# Patient Record
Sex: FEMALE | Race: BLACK OR AFRICAN AMERICAN | Employment: OTHER | URBAN - METROPOLITAN AREA
[De-identification: names, ages, dates, MRNs, and addresses within clinical notes are randomized per-mention and may not be internally consistent; named-entity substitution may affect disease eponyms.]

---

## 2017-01-31 PROBLEM — R79.89 ABNORMAL THYROID BLOOD TEST: Status: ACTIVE | Noted: 2017-01-31

## 2017-05-19 PROBLEM — M10.9 ACUTE GOUT INVOLVING TOE OF LEFT FOOT: Status: ACTIVE | Noted: 2017-05-19

## 2017-07-18 PROBLEM — R53.83 FATIGUE: Status: ACTIVE | Noted: 2017-07-18

## 2017-07-18 PROBLEM — R60.0 LOCALIZED EDEMA: Status: ACTIVE | Noted: 2017-07-18

## 2018-04-11 PROBLEM — Z91.81 RISK FOR FALLS: Status: ACTIVE | Noted: 2018-04-11

## 2018-04-11 PROBLEM — I89.0 LYMPHEDEMA OF BOTH LOWER EXTREMITIES: Status: ACTIVE | Noted: 2018-04-11

## 2018-04-20 ENCOUNTER — HOSPITAL ENCOUNTER (OUTPATIENT)
Dept: PHYSICAL THERAPY | Age: 83
End: 2018-04-20
Attending: INTERNAL MEDICINE

## 2018-05-02 ENCOUNTER — HOSPITAL ENCOUNTER (OUTPATIENT)
Dept: PHYSICAL THERAPY | Age: 83
Discharge: HOME | End: 2018-05-02
Attending: INTERNAL MEDICINE

## 2018-05-02 NOTE — THERAPY EVALUATION
Yan Law  : 1935  Primary: Yamel Cisneros Medicare Hmo  Secondary:  [address]  at [facility] [address] [facility] [address], [facility] [address]  [phone]   [phone]        OUTPATIENT OCCUPATIONAL THERAPY: Note 2018      Ms. DE JESUS [facility] arrived for lymphedema therapy evaluation today. Upon chart review and discussion with patient, patient is having increased shortness of breath, weight gain, and increased edema in lower extremities, including the thighs, as well as the abdomen. She reports that she spoke to her cardiologist who recommended that she see her nephrologist and then follow up with cardiology. She has made an appointment to see her nephrologist next week. We will hold the therapy evaluation at this time until she has seen the nephrologist and cardiologist and is cleared medically to begin complete decongestive therapy.     Thank you for this referral,  Robert Baeza, OT

## 2018-05-12 ENCOUNTER — HOSPITAL ENCOUNTER (EMERGENCY)
Age: 83
Discharge: HOME | End: 2018-05-12
Attending: EMERGENCY MEDICINE
Payer: MEDICARE

## 2018-05-12 ENCOUNTER — APPOINTMENT (OUTPATIENT)
Dept: GENERAL RADIOLOGY | Age: 83
End: 2018-05-12
Attending: EMERGENCY MEDICINE
Payer: MEDICARE

## 2018-05-12 VITALS
OXYGEN SATURATION: 98 % | TEMPERATURE: 97.9 F | SYSTOLIC BLOOD PRESSURE: 120 MMHG | DIASTOLIC BLOOD PRESSURE: 88 MMHG | WEIGHT: 247 LBS | RESPIRATION RATE: 18 BRPM | BODY MASS INDEX: 39.7 KG/M2 | HEIGHT: 66 IN | HEART RATE: 93 BPM

## 2018-05-12 DIAGNOSIS — M17.10 ARTHRITIS OF KNEE: Primary | ICD-10-CM

## 2018-05-12 PROCEDURE — 74011250637 HC RX REV CODE- 250/637: Performed by: EMERGENCY MEDICINE

## 2018-05-12 PROCEDURE — 99283 EMERGENCY DEPT VISIT LOW MDM: CPT | Performed by: EMERGENCY MEDICINE

## 2018-05-12 PROCEDURE — 73562 X-RAY EXAM OF KNEE 3: CPT

## 2018-05-12 RX ORDER — ACETAMINOPHEN 325 MG/1
650 TABLET ORAL ONCE
Status: COMPLETED | OUTPATIENT
Start: 2018-05-12 | End: 2018-05-12

## 2018-05-12 RX ADMIN — ACETAMINOPHEN 650 MG: 325 TABLET ORAL at 10:37

## 2018-05-12 NOTE — ED TRIAGE NOTES
Patient reports right knee pain x3 weeks until yesterday when right knee began feeling numb. Reports numbness to touch and unable to \"control\" right knee. Reports is unable to bear weight. Denies injury.

## 2018-05-12 NOTE — DISCHARGE INSTRUCTIONS
Arthritis: Care Instructions  Your Care Instructions  Arthritis, also called osteoarthritis, is a breakdown of the cartilage that cushions your joints. When the cartilage wears down, your bones rub against each other. This causes pain and stiffness. Many people have some arthritis as they age. Arthritis most often affects the joints of the spine, hands, hips, knees, or feet. You can take simple measures to protect your joints, ease your pain, and help you stay active. Follow-up care is a key part of your treatment and safety. Be sure to make and go to all appointments, and call your doctor if you are having problems. It's also a good idea to know your test results and keep a list of the medicines you take. How can you care for yourself at home? · Stay at a healthy weight. Being overweight puts extra strain on your joints. · Talk to your doctor or physical therapist about exercises that will help ease joint pain. ¨ Stretch. You may enjoy gentle forms of yoga to help keep your joints and muscles flexible. ¨ Walk instead of jog. Other types of exercise that are less stressful on the joints include riding a bicycle, swimming, jerome chi, or water exercise. ¨ Lift weights. Strong muscles help reduce stress on your joints. Stronger thigh muscles, for example, take some of the stress off of the knees and hips. Learn the right way to lift weights so you do not make joint pain worse. · Take your medicines exactly as prescribed. Call your doctor if you think you are having a problem with your medicine. · Take pain medicines exactly as directed. ¨ If the doctor gave you a prescription medicine for pain, take it as prescribed. ¨ If you are not taking a prescription pain medicine, ask your doctor if you can take an over-the-counter medicine. · Use a cane, crutch, walker, or another device if you need help to get around. These can help rest your joints.  You also can use other things to make life easier, such as a higher toilet seat and padded handles on kitchen utensils. · Do not sit in low chairs, which can make it hard to get up. · Put heat or cold on your sore joints as needed. Use whichever helps you most. You also can take turns with hot and cold packs. ¨ Apply heat 2 or 3 times a day for 20 to 30 minutes-using a heating pad, hot shower, or hot pack-to relieve pain and stiffness. ¨ Put ice or a cold pack on your sore joint for 10 to 20 minutes at a time. Put a thin cloth between the ice and your skin. When should you call for help? Call your doctor now or seek immediate medical care if:  ? · You have sudden swelling, warmth, or pain in any joint. ? · You have joint pain and a fever or rash. ? · You have such bad pain that you cannot use a joint. ? Watch closely for changes in your health, and be sure to contact your doctor if:  ? · You have mild joint symptoms that continue even with more than 6 weeks of care at home. ? · You have stomach pain or other problems with your medicine. Where can you learn more? Go to http://manny-barbara.info/. Enter M391 in the search box to learn more about \"Arthritis: Care Instructions. \"  Current as of: October 31, 2016  Content Version: 11.4  © 3062-1695 [employer] Care instructions adapted under license by [employer] (which disclaims liability or warranty for this information). If you have questions about a medical condition or this instruction, always ask your healthcare professional. [place] [address] any warranty or liability for your use of this information.

## 2018-05-12 NOTE — ED PROVIDER NOTES
HPI Comments: Patient is an 66-year-old female with history of hypertension, diabetes, heart disease, chronic lymphedema who presents to the ER today complaining of knee pain. She initially told triage it is been hurting for several weeks. She told me it started hurting yesterday and then started feeling normal.  She had trouble walking. There was no fall or known injury. Daughter is at the bedside and reports that normally her mother can walk on her own but yesterday and today was unable to. She states that the knee feels \"unstable\" and normal.    Patient is a 80 y.o. female presenting with knee pain. The history is provided by the patient and a relative. Knee Pain    This is a new problem. The current episode started yesterday. The problem occurs constantly. The problem has been gradually worsening. The pain is present in the right knee. The quality of the pain is described as aching. Associated symptoms include numbness, limited range of motion and stiffness. Pertinent negatives include no itching, no back pain and no neck pain. The symptoms are aggravated by standing, movement and palpation. She has tried nothing for the symptoms. The treatment provided no relief. There has been no history of extremity trauma.         Past Medical History:   Diagnosis Date    Acute gout involving toe of left foot 5/19/2017    CAD (coronary artery disease) 2007    \"irregular heart beat\"    Cardiomyopathy [facility]) 11/25/2009    Chronic indwelling Harper catheter     Chronic kidney disease 2002    on HD since Nov 2012    Chronic systolic heart failure [facility]) 2007    Colon polyps 2016    CVA (cerebral vascular accident) ([address]     CVA (cerebral vascular accident) ([address] 8/8/2016    Deep vein thrombosis (DVT) of lower extremity ([address] 5/27/2016    Diabetes ([address] 2002    Diabetes mellitus ([address] 6/20/2012    Duodenal ulcer     Duodenal ulcer 11/18/2012    Duodenal ulcer 11/18/2012    Dyslipidemia 8/8/2016    Essential hypertension 10/26/2009    Heart failure ([facility]     Hypertension 2009    Hypoglycemia, unspecified 12/3/2012    JOÃO (iron deficiency anemia)     Obesity, morbid 11/25/2009    Other ill-defined conditions(799.89) 2002    Lymphedema    Persistent atrial fibrillation ([facility] 10/26/2009    Pulmonary HTN ([facility]     SBO (small bowel obstruction) ([facility] 8/8/2013    Sleep apnea 2010    non-compliant with CPAP    Sleep apnea - noncompliant with CPAP 11/13/2012    Type II diabetes mellitus, uncontrolled ([facility] 6/20/2012    Urinary tract infection 12/3/2012    UTI (lower urinary tract infection) 8/8/2013    UTI (urinary tract infection) 11/23/2012       Past Surgical History:   Procedure Laterality Date    COLONOSCOPY N/A 6/20/2016    [facility] END[facility]COPY    HX COLONOSCOPY  2016    polyp    HX HYSTERECTOMY  >20 years ago    HX TUBAL LIGATION  >40 years ago         Family History:   Problem Relation Age of Onset    Heart Disease Sister     Diabetes Other        Social History     Social History    Marital status:      Spouse name: N/A    Number of children: N/A    Years of education: N/A     Occupational History    Not on file. Social History Main Topics    Smoking status: Never Smoker    Smokeless tobacco: Never Used    Alcohol use No    Drug use: No    Sexual activity: Not on file     Other Topics Concern    Not on file     Social History Narrative    , lives at home with spouse. Retired day care worker         ALLERGIES: Benazepril; Dynacirc [isradipine]; and Peanut    Review of Systems   Constitutional: Negative. HENT: Negative. Eyes: Negative. Respiratory: Negative. Cardiovascular: Negative. Gastrointestinal: Negative. Endocrine: Negative. Genitourinary: Negative. Musculoskeletal: Positive for stiffness. Negative for back pain and neck pain. Skin: Negative for itching. Neurological: Positive for numbness. Negative for dizziness and weakness. Vitals:    05/12/18 0849   Pulse: 84   Resp: 20   Temp: 97.9 °F (36.6 °C)   SpO2: 98%   Weight: 112 kg (247 lb)   Height: 5' 6\" (1.676 m)            Physical Exam   Constitutional: She is oriented to person, place, and time. She appears well-developed and well-nourished. Overweight. HENT:   Head: Normocephalic and atraumatic. Cardiovascular: Intact distal pulses. Musculoskeletal: She exhibits edema and tenderness. Chronic lymphedema bilateral lower extremities, diffuse tenderness to the right knee and pain with passive range of motion. No obvious deformity. Neurological: She is alert and oriented to person, place, and time. She has normal strength. No cranial nerve deficit or sensory deficit. GCS eye subscore is 4. GCS verbal subscore is 5. GCS motor subscore is 6. Skin: Skin is warm and dry. No rash noted. Nursing note and vitals reviewed. MDM  Number of Diagnoses or Management Options  Diagnosis management comments: Differential diagnosis includes arthritis, effusion, fracture, dislocation       Amount and/or Complexity of Data Reviewed  Clinical lab tests: ordered and reviewed  Review and summarize past medical records: yes  Independent visualization of images, tracings, or specimens: yes    Risk of Complications, Morbidity, and/or Mortality  Presenting problems: low  Diagnostic procedures: low  Management options: low    Patient Progress  Patient progress: stable        ED Course   10:37 AM  X-ray of the right knee shows extensive arthritic changes. We will place her into a knee immobilizer for support. Advised Tylenol or ibuprofen for pain and follow-up with her doctor. Voice dictation software was used during the making of this note. This software is not perfect and grammatical and other typographical errors may be present. This note has been proofread, but may still contain errors.   Khurram Ramos MD; 5/12/2018 @10:40 AM ===================================================================        Procedures

## 2018-05-15 PROBLEM — E11.21 TYPE 2 DIABETES WITH NEPHROPATHY (HCC): Status: ACTIVE | Noted: 2018-05-15

## 2018-05-22 ENCOUNTER — HOSPITAL ENCOUNTER (OUTPATIENT)
Dept: PHYSICAL THERAPY | Age: 83
Discharge: HOME | End: 2018-05-22
Payer: MEDICARE

## 2018-05-22 PROCEDURE — G8991 OTHER PT/OT GOAL STATUS: HCPCS

## 2018-05-22 PROCEDURE — 97140 MANUAL THERAPY 1/> REGIONS: CPT

## 2018-05-22 PROCEDURE — G8990 OTHER PT/OT CURRENT STATUS: HCPCS

## 2018-05-22 PROCEDURE — 97166 OT EVAL MOD COMPLEX 45 MIN: CPT

## 2018-05-22 NOTE — PROGRESS NOTES
Ambulatory/Rehab Services H2 Model Falls Risk Assessment    Risk Factor Pts. ·   Confusion/Disorientation/Impulsivity  []    4 ·   Symptomatic Depression  []   2 ·   Altered Elimination  []   1 ·   Dizziness/Vertigo  []   1 ·   Gender (Male)  []   1 ·   Any administered antiepileptics (anticonvulsants):  []   2 ·   Any administered benzodiazepines:  []   1 ·   Visual Impairment (specify):  []   1 ·   Portable Oxygen Use  []   1 ·   Orthostatic ? BP  []   1 ·   History of Recent Falls (within 3 mos.)  []   5     Ability to Rise from Chair (choose one) Pts. ·   Ability to rise in a single movement  []   0 ·   Pushes up, successful in one attempt  [x]   1 ·   Multiple attempts, but successful  []   3 ·   Unable to rise without assistance  []   4   Total: (5 or greater = High Risk) 1     Falls Prevention Plan:   []                Physical Limitations to Exercise (specify):   []                Mobility Assistance Device (type):   []                Exercise/Equipment Adaptation (specify):    ©2010 [facility] Patent #[id number].  Federal Law prohibits the replication, distribution or use without written permission from [facility] [employer] [place]

## 2018-05-22 NOTE — THERAPY EVALUATION
Santy Traylor  : 1935  Primary: [employer]  Secondary:  [address]  at [address]  [address], [address]  [phone]   [phone]           OUTPATIENT OCCUPATIONAL THERAPY: Initial Assessment and Daily Note 2018    ICD-10: Treatment Diagnosis: Q82.0 hereditary or primary lymphedema, R26.2 difficulty in walking, not elsewhere classified, G89.0 pain, not elsewhere classified   Precautions/Allergies:   Benazepril; Dynacirc [isradipine]; and Peanut     Fall Risk Score: 1 (? 5 = High Risk)  MD Orders: eval and treat MEDICAL/REFERRING DIAGNOSIS:   Localized edema [R60.0]  Lymphedema, not elsewhere classified [I89.0]   DATE OF ONSET: lifelong , worsening symptoms over the last 5-10 years    REFERRING PHYSICIAN: George Barber MD  RETURN PHYSICIAN APPOINTMENT: to be determined      INITIAL ASSESSMENT:  Ms. Ron Bridges was referred to OT for the evaluation and treatment of bilateral LE lymphedema that appears to be genetic in nature. She states swelling has been present in her legs since she as a teenager/young adult. Symptoms worsened with time and she had therapy for lymphedema over 10 years ago. She wore compression garments for a few years but then stopped wearing them because they were too difficult to don and legs were swelling even while she wore them. Ms. Ron Bridges states her swelling was relatively stable for awhile but symptoms have worsened again over the last few years. Her son and granddaughter also have LE lymphedema. Swelling in her LE's have caused skin changes and legs are very heavy making them difficult to lift for walking and transfers. Pt would benefit from skilled OT to decrease bilateral LE lymphedema before transitioning to long term compression garments and home program.     PLAN OF CARE:   PROBLEM LIST:  1. Decreased Activity Tolerance  2. Edema/Girth  3.  Decreased Skin Integrity/Hygeine INTERVENTIONS PLANNED  1. Skin care  2. Compression bandaging  3. Fitting for compression garment(s)  4. Manual therapy/Manual lymph drainage  5. Therapeutic exercise/Therapeutic activities  6. Patient Education  7. Compression pump trial prn  8.  kinesiotaping    TREATMENT PLAN:  Effective Dates: 5-22-18 TO 8-19-18. Frequency/Duration: 2 times a week for 90 days  2 x 8 week x 90 days and upon reassessment. Will adjust frequency and duration as progress indicates. GOALS: (Goals have been discussed and agreed upon with patient.)  Short-Term Functional Goals: Time Frame: 45 days  1. The patient/caregiver will demonstrate understanding of lymphedema precautions. 2. Patient will be independent with skin care regimen to decrease risk of cellulitis. 3. The patient/caregiver will be independent with self-manual lymph drainage techniques and show decrease in limb volume. 4. Patient will be independent in lymphatic exercises. Discharge Goals: Time Frame: 90 days  1. Patient's bilateral lower extremity circumferential measurements will decrease on volumetric graph by 10-15cm to maximize functional use in ADL's.    2. The patient/caregiver will be independent with home management of lymphedema. 3. Patient/caregiver will be independent donning and doffing bilateral lower extremity compression garment and compliant with daily wear. .    Rehabilitation Potential For Stated Goals: Good  Regarding Delroy Simons's therapy, I certify that the treatment plan above will be carried out by a therapist or under their direction. Thank you for this referral,  Brian Eduardo OT     Referring Physician Signature: Selvin Nunez MD _________________________  Date _________            The information in this section was collected on 5/22/2018 (except where otherwise noted).   OCCUPATIONAL PROFILE & HISTORY:   History of Present Injury/Illness (Reason for Referral):  Ms. Marcelo Perkins was referred to OT for the evaluation and treatment of bilateral LE lymphedema that appears to be genetic in nature. She states swelling has been present in her legs since she as a teenager/young adult. Symptoms worsened with time and she had therapy for lymphedema over 10 years ago. She wore compression garments for a few years but then stopped wearing them because they were too difficult to don and legs were swelling even while she wore them. Ms. Chiara Gaston states her swelling was relatively stable for awhile but symptoms have worsened again over the last few years. Her son and granddaughter also have LE lymphedema. Swelling in her LE's have caused skin changes and legs are very heavy making them difficult to lift for walking and transfers. Pt would benefit from skilled OT to decrease bilateral LE lymphedema before transitioning to long term compression garments and home program.    Past Medical History/Comorbidities:   Ms. Chiara Gaston  has a past medical history of Acute gout involving toe of left foot (5/19/2017); CAD (coronary artery disease) (2007); Cardiomyopathy ([address] (11/25/2009); Chronic indwelling Harper catheter; Chronic kidney disease (2002); Chronic systolic heart failure ([address] (2007); Colon polyps (2016); CVA (cerebral vascular accident) [facility]); CVA (cerebral vascular accident) ([address] (8/8/2016); Deep vein thrombosis (DVT) of lower extremity ([address] (5/27/2016); Diabetes ([address] (2002); Diabetes mellitus ([address] (6/20/2012); Duodenal ulcer; Duodenal ulcer (11/18/2012); Duodenal ulcer (11/18/2012); Dyslipidemia (8/8/2016); Essential hypertension (10/26/2009); Heart failure ([address] Hypertension (2009); Hypoglycemia, unspecified (12/3/2012); JOÃO (iron deficiency anemia); Obesity, morbid (11/25/2009); Other ill-defined conditions(799.89) (2002); Persistent atrial fibrillation ([address] (10/26/2009); Pulmonary HTN ([address] SBO (small bowel obstruction) ([address] (8/8/2013); Sleep apnea (2010); Sleep apnea - noncompliant with CPAP (11/13/2012);  Type II diabetes mellitus, uncontrolled ([facility] (6/20/2012); Urinary tract infection (12/3/2012); UTI (lower urinary tract infection) (8/8/2013); and UTI (urinary tract infection) (11/23/2012). She also has no past medical history of Asthma; Autoimmune disease ([facility] Cancer ([facility] COPD; DEMENTIA; Liver disease; Psychiatric disorder; or Seizures ([facility] Ms. Allison Mora  has a past surgical history that includes hx hysterectomy (>20 years ago); hx tubal ligation (>40 years ago); hx colonoscopy (2016); and colonoscopy (N/A, 6/20/2016). Social History/Living Environment:     Pt lives in her own home with her spouse - no steps for entry  Prior Level of Function/Work/Activity:  Retired - no regular exercise   Dominant Side:         RIGHT  Previous Treatment Approaches:          Therapy for lymphedema >10 years ago - she has not worn compression garments in 5-7 years  Current Medications:    Current Outpatient Prescriptions:     predniSONE (DELTASONE) 20 mg tablet, Take 3 tablets PO once daily for 3 days; Then take 2 tablets PO once daily for 3 days; Then take 1 tablet  PO once daily for 3 days. , Disp: 18 Tab, Rfl: 1    glimepiride (AMARYL) 1 mg tablet, TAKE 1 TAB BY MOUTH DAILY (BEFORE BREAKFAST). , Disp: 30 Tab, Rfl: 3    torsemide (DEMADEX) 20 mg tablet, TAKE 1 TABLET BY MOUTH DAILY TAKE 4 TABLETS ON MON,WED,AND FRI,THEN 3 TABLETS ON TUES,THURS AND SAT, Disp: 100 Tab, Rfl: 5    metOLazone (ZAROXOLYN) 5 mg tablet, TAKE 1 TABLET BY MOUTH 30 MINUTES BEFORE LASIX DOSE, Disp: 90 Tab, Rfl: 4    furosemide (LASIX) 40 mg tablet, Take 2 Tabs by mouth every twelve (12) hours. Indications: fluid and heart, Disp: 180 Tab, Rfl: 4    carvedilol (COREG) 12.5 mg tablet, Take 1 Tab by mouth two (2) times daily (with meals). , Disp: 180 Tab, Rfl: 4    cholecalciferol (VITAMIN D3) 1,000 unit cap, Take 1 Cap by mouth daily. , Disp: 90 Cap, Rfl: 4    potassium chloride SA (MICRO-K) 10 mEq capsule, Take 1 Cap by mouth daily.  Indications: hypokalemia, Disp: 90 Cap, Rfl: 4    spironolactone (ALDACTONE) 25 mg tablet, Take 1 Tab by mouth two (2) times a day. Indications: heart failure, Disp: 180 Tab, Rfl: 4    pantoprazole (PROTONIX) 40 mg tablet, Take 1 Tab by mouth Daily (before breakfast). , Disp: 90 Tab, Rfl: 4    isosorbide mononitrate ER (IMDUR) 30 mg tablet, Take 1 Tab by mouth daily. , Disp: 90 Tab, Rfl: 4    aspirin 81 mg tablet, Take 81 mg by mouth daily. , Disp: , Rfl:             Date Last Reviewed:  5/22/2018   Complexity Level : Expanded review of therapy/medical records (1-2):  MODERATE COMPLEXITY   ASSESSMENT OF OCCUPATIONAL PERFORMANCE:   Palpation:          Bilateral LE lymphedema with dense/non pitting fluid/tissue throughout :  Lobular lymphedema around ankles/dorsal hump  - mild swelling in right thigh  ROM:          WFL  (ankle movements are stiff due to increased swelling)  Special Tests:          stemmer sign positive   Skin Integrity:         Intact with  Hyperpigmentation/discoloration, dry/scaly  Sensation:  Intact per pt  Functional Mobility:  Rollator for community and cane at home  Activities of Daily Living :  Modified independence with decreased tolerance   Edema/Girth:  non-pitting   PRETREATMENT AFFECTED LIMB(s): lower extremity      Date:  5-22-18         Right / Left           Groin   []      []           8 inches   []      []           4 inches   []      []         PoplitealSpace   [x]      [x] 46/48          8 inches   [x]      [x] 49/49          4 inches   [x]      [x] 41/39          Ankle   [x]      [x] 38.5/35          Instep   [x]      [x] 29/27.5        Measurements are taken in centimeters:  2.54 cm = 1 inch     203. 5/198.5                       Physical Skills Involved:  1. Activity Tolerance  2. Edema  3. Skin Integrity Cognitive Skills Affected (resulting in the inability to perform in a timely and safe manner):   Psychosocial Skills Affected:  1. Habits/Routines  2.  Environmental Adaptation   Number of elements that affect the Plan of Care: 3-5:  MODERATE COMPLEXITY   CLINICAL DECISION MAKING:   Outcome Measure: Tool Used: Tool Used: Lymphedema Life Impact Scale   Score:  Initial: 34 Most Recent: X (Date: -- )   Interpretation of Score: The Lymphedema Life Impact Scale (LLIS) is a validated instrument that measures the physical, functional, and psychosocial concerns pertinent to patients with extremity lymphedema. The Scale's questionnaire is administered to patients to gauge impairments, activity limitations, and participation restrictions resulting from their lymphedema. Score 0 1-13 14-26 27-40 41-54 55-67 68   Modifier CH CI CJ CK CL CM CN     ? Other PT/OT Primary Functional Limitations:     - CURRENT STATUS: CK - 40%-59% impaired, limited or restricted    - GOAL STATUS: CJ - 20%-39% impaired, limited or restricted    - D/C STATUS:  ---------------To be determined---------------       Medical Necessity:   · Skilled intervention continues to be required due to uncontrolled swelling putting her at risk for cellulitis and interfering with LE mobility. Reason for Services/Other Comments:  · Patient continues to require skilled intervention due to inability to self manage . Clinical Decision-Making Assessment:     Use of outcome tool(s) and clinical judgement create a POC that gives a: Questionable prediction of patient's progress: MODERATE COMPLEXITY   TREATMENT:   (In addition to Assessment/Re-Assessment sessions the following treatments were rendered)    Pre-treatment Symptoms/Complaints:  Bilateral LE lymphedema   Pain: Initial:   Pain Intensity 1: 0  Post Session:  0   Occupational Therapy Treatments:    OT eval( x ) OT eval was completed. Pt received information on lymphedema and risk reduction/self management practices as outlined by the [employer]. Therapeutic Exercise ( minutes):     HEP:  As above; handouts given to patient for all exercises.   Manual Therapy:30 min  Pt education on signs/symptoms and treatment for lymphedema - skin care/compression bandaging/measurements made of LE's           Manual Lymph Drainage:          Lymph Nodes:    Cervical Supraclavicular Axillary Abdominal Inguinal Popliteal Antecubital   RIGHT []     []     []     []     []     []     []       LEFT []     []     []     []     []     []     []          Anastamoses:   Axillo-axillary Inguino-inguinal Axillo-inguinal Inguino-axillary   ANTERIOR []     []     []     []       POSTERIOR []     []     []     []       RIGHT []     []     []     []       LEFT []     []     []     []         Limbs:   []    RUE     []    LUE     []    RLE    []    LLE   Compression: ( minutes): After skin care/inspection, a multi layered compression bandage was applied to bilateral LE's from foot to knee over Lymphesoft foam.  Pt instructed to remove if any increase in shortness of breath (she has shortness of breath with activity- under care of MD). She verbalized understanding     Treatment/Session Assessment:    · Response to Treatment:  Pt tolerated treatment session well with no medical complications. Skin was intact and addressed with Eucerin. Pt.'s goal for therapy is to \"decrease swelling so her legs won't be so heavy\". She has support system in spouse and family and has been through CDT before. She is concerned about high copay. Will decrease swelling and transition to Lluvia Joyner as soon as appropriate as she states she and her spouse have tried and can not manage bandages at home. · Compliance with Program/Exercises: Will assess as treatment progresses. · Recommendations/Intent for next treatment session: \"Next visit will focus on complete decongestive therapy\".   Total Treatment Duration:60min  OT Patient Time In/Time Out  Time In: 0100  Time Out: [address], OT

## 2018-05-24 ENCOUNTER — HOSPITAL ENCOUNTER (OUTPATIENT)
Dept: PHYSICAL THERAPY | Age: 83
Discharge: HOME | End: 2018-05-24
Payer: MEDICARE

## 2018-05-24 PROCEDURE — 97140 MANUAL THERAPY 1/> REGIONS: CPT

## 2018-05-24 NOTE — PROGRESS NOTES
Teresita Kannan  : 1935  Primary: Brent[employer] Cisneros Medicare o  Secondary:  [address]  at [place] [address] Therapy  [address], [address]  [phone]   [phone]           OUTPATIENT OCCUPATIONAL THERAPY: Daily Note 2018    ICD-10: Treatment Diagnosis: Q82.0 hereditary or primary lymphedema, R26.2 difficulty in walking, not elsewhere classified, G89.0 pain, not elsewhere classified   Precautions/Allergies:   Benazepril; Dynacirc [isradipine]; and Peanut     Fall Risk Score: 1 (? 5 = High Risk)  MD Orders: eval and treat MEDICAL/REFERRING DIAGNOSIS:   Localized edema [R60.0]  Lymphedema, not elsewhere classified [I89.0]   DATE OF ONSET: lifelong , worsening symptoms over the last 5-10 years    REFERRING PHYSICIAN: Sandre Buerger, MD  RETURN PHYSICIAN APPOINTMENT: to be determined      INITIAL ASSESSMENT:  Ms. Robbin Cook was referred to OT for the evaluation and treatment of bilateral LE lymphedema that appears to be genetic in nature. She states swelling has been present in her legs since she as a teenager/young adult. Symptoms worsened with time and she had therapy for lymphedema over 10 years ago. She wore compression garments for a few years but then stopped wearing them because they were too difficult to don and legs were swelling even while she wore them. Ms. Robbin Cook states her swelling was relatively stable for awhile but symptoms have worsened again over the last few years. Her son and granddaughter also have LE lymphedema. Swelling in her LE's have caused skin changes and legs are very heavy making them difficult to lift for walking and transfers. Pt would benefit from skilled OT to decrease bilateral LE lymphedema before transitioning to long term compression garments and home program.     PLAN OF CARE:   PROBLEM LIST:  1. Decreased Activity Tolerance  2. Edema/Girth  3. Decreased Skin Integrity/Hygeine INTERVENTIONS PLANNED  1. Skin care  2. Compression bandaging  3. Fitting for compression garment(s)  4. Manual therapy/Manual lymph drainage  5. Therapeutic exercise/Therapeutic activities  6. Patient Education  7. Compression pump trial prn  8.  kinesiotaping    TREATMENT PLAN:  Effective Dates: 5-22-18 TO 8-19-18. Frequency/Duration: 2 times a week for 90 days  2 x 8 week x 90 days and upon reassessment. Will adjust frequency and duration as progress indicates. GOALS: (Goals have been discussed and agreed upon with patient.)  Short-Term Functional Goals: Time Frame: 45 days  1. The patient/caregiver will demonstrate understanding of lymphedema precautions. 2. Patient will be independent with skin care regimen to decrease risk of cellulitis. 3. The patient/caregiver will be independent with self-manual lymph drainage techniques and show decrease in limb volume. 4. Patient will be independent in lymphatic exercises. Discharge Goals: Time Frame: 90 days  1. Patient's bilateral lower extremity circumferential measurements will decrease on volumetric graph by 10-15cm to maximize functional use in ADL's.    2. The patient/caregiver will be independent with home management of lymphedema. 3. Patient/caregiver will be independent donning and doffing bilateral lower extremity compression garment and compliant with daily wear. .    Rehabilitation Potential For Stated Goals: Good  Regarding Delroy Simons's therapy, I certify that the treatment plan above will be carried out by a therapist or under their direction. Thank you for this referral,  [place] Leos OT                 The information in this section was collected on 5/24/2018 (except where otherwise noted). OCCUPATIONAL PROFILE & HISTORY:   History of Present Injury/Illness (Reason for Referral):  Ms. Hermann Flynn was referred to OT for the evaluation and treatment of bilateral LE lymphedema that appears to be genetic in nature.   She states swelling has been present in her legs since she as a teenager/young adult. Symptoms worsened with time and she had therapy for lymphedema over 10 years ago. She wore compression garments for a few years but then stopped wearing them because they were too difficult to don and legs were swelling even while she wore them. Ms. Uzair Pendleton states her swelling was relatively stable for awhile but symptoms have worsened again over the last few years. Her son and granddaughter also have LE lymphedema. Swelling in her LE's have caused skin changes and legs are very heavy making them difficult to lift for walking and transfers. Pt would benefit from skilled OT to decrease bilateral LE lymphedema before transitioning to long term compression garments and home program.    Past Medical History/Comorbidities:   Ms. Uzair Pendleton  has a past medical history of Acute gout involving toe of left foot (5/19/2017); CAD (coronary artery disease) (2007); Cardiomyopathy ([address] (11/25/2009); Chronic indwelling Harper catheter; Chronic kidney disease (2002); Chronic systolic heart failure ([address] (2007); Colon polyps (2016); CVA (cerebral vascular accident) [facility]); CVA (cerebral vascular accident) ([address] (8/8/2016); Deep vein thrombosis (DVT) of lower extremity ([address] (5/27/2016); Diabetes ([address] (2002); Diabetes mellitus ([address] (6/20/2012); Duodenal ulcer; Duodenal ulcer (11/18/2012); Duodenal ulcer (11/18/2012); Dyslipidemia (8/8/2016); Essential hypertension (10/26/2009); Heart failure ([address] Hypertension (2009); Hypoglycemia, unspecified (12/3/2012); JOÃO (iron deficiency anemia); Obesity, morbid (11/25/2009); Other ill-defined conditions(799.89) (2002); Persistent atrial fibrillation ([address] (10/26/2009); Pulmonary HTN ([address] SBO (small bowel obstruction) ([address] (8/8/2013); Sleep apnea (2010); Sleep apnea - noncompliant with CPAP (11/13/2012); Type II diabetes mellitus, uncontrolled ([facility] (6/20/2012);  Urinary tract infection (12/3/2012); UTI (lower urinary tract infection) (8/8/2013); and UTI (urinary tract infection) (11/23/2012). She also has no past medical history of Asthma; Autoimmune disease ([facility] Cancer ([facility] COPD; DEMENTIA; Liver disease; Psychiatric disorder; or Seizures ([facility] Ms. Samira Ragland  has a past surgical history that includes hx hysterectomy (>20 years ago); hx tubal ligation (>40 years ago); hx colonoscopy (2016); and colonoscopy (N/A, 6/20/2016). Social History/Living Environment:     Pt lives in her own home with her spouse - no steps for entry  Prior Level of Function/Work/Activity:  Retired - no regular exercise   Dominant Side:         RIGHT  Previous Treatment Approaches:          Therapy for lymphedema >10 years ago - she has not worn compression garments in 5-7 years  Current Medications:    Current Outpatient Prescriptions:     predniSONE (DELTASONE) 20 mg tablet, Take 3 tablets PO once daily for 3 days; Then take 2 tablets PO once daily for 3 days; Then take 1 tablet  PO once daily for 3 days. , Disp: 18 Tab, Rfl: 1    glimepiride (AMARYL) 1 mg tablet, TAKE 1 TAB BY MOUTH DAILY (BEFORE BREAKFAST). , Disp: 30 Tab, Rfl: 3    torsemide (DEMADEX) 20 mg tablet, TAKE 1 TABLET BY MOUTH DAILY TAKE 4 TABLETS ON MON,WED,AND FRI,THEN 3 TABLETS ON TUES,THURS AND SAT, Disp: 100 Tab, Rfl: 5    metOLazone (ZAROXOLYN) 5 mg tablet, TAKE 1 TABLET BY MOUTH 30 MINUTES BEFORE LASIX DOSE, Disp: 90 Tab, Rfl: 4    furosemide (LASIX) 40 mg tablet, Take 2 Tabs by mouth every twelve (12) hours. Indications: fluid and heart, Disp: 180 Tab, Rfl: 4    carvedilol (COREG) 12.5 mg tablet, Take 1 Tab by mouth two (2) times daily (with meals). , Disp: 180 Tab, Rfl: 4    cholecalciferol (VITAMIN D3) 1,000 unit cap, Take 1 Cap by mouth daily. , Disp: 90 Cap, Rfl: 4    potassium chloride SA (MICRO-K) 10 mEq capsule, Take 1 Cap by mouth daily. Indications: hypokalemia, Disp: 90 Cap, Rfl: 4    spironolactone (ALDACTONE) 25 mg tablet, Take 1 Tab by mouth two (2) times a day.  Indications: heart failure, Disp: 180 Tab, Rfl: 4    pantoprazole (PROTONIX) 40 mg tablet, Take 1 Tab by mouth Daily (before breakfast). , Disp: 90 Tab, Rfl: 4    isosorbide mononitrate ER (IMDUR) 30 mg tablet, Take 1 Tab by mouth daily. , Disp: 90 Tab, Rfl: 4    aspirin 81 mg tablet, Take 81 mg by mouth daily. , Disp: , Rfl:             Date Last Reviewed:  5/24/2018   Complexity Level : Expanded review of therapy/medical records (1-2):  MODERATE COMPLEXITY   ASSESSMENT OF OCCUPATIONAL PERFORMANCE:   Palpation:          Bilateral LE lymphedema with dense/non pitting fluid/tissue throughout :  Lobular lymphedema around ankles/dorsal hump  - mild swelling in right thigh  ROM:          WFL  (ankle movements are stiff due to increased swelling)  Special Tests:          stemmer sign positive   Skin Integrity:         Intact with  Hyperpigmentation/discoloration, dry/scaly  Sensation:  Intact per pt  Functional Mobility:  Rollator for community and cane at home  Activities of Daily Living :  Modified independence with decreased tolerance   Edema/Girth:  non-pitting   PRETREATMENT AFFECTED LIMB(s): lower extremity      Date:  5-22-18         Right / Left           Groin   []      []           8 inches   []      []           4 inches   []      []         PoplitealSpace   [x]      [x] 46/48          8 inches   [x]      [x] 49/49          4 inches   [x]      [x] 41/39          Ankle   [x]      [x] 38.5/35          Instep   [x]      [x] 29/27.5        Measurements are taken in centimeters:  2.54 cm = 1 inch     203. 5/198.5                       Physical Skills Involved:  1. Activity Tolerance  2. Edema  3. Skin Integrity Cognitive Skills Affected (resulting in the inability to perform in a timely and safe manner):   Psychosocial Skills Affected:  1. Habits/Routines  2. Environmental Adaptation   Number of elements that affect the Plan of Care: 3-5:  MODERATE COMPLEXITY   CLINICAL DECISION MAKING:   Outcome Measure: Tool Used:  Tool Used: Lymphedema Life Impact Scale   Score:  Initial: 34 Most Recent: X (Date: -- )   Interpretation of Score: The Lymphedema Life Impact Scale (LLIS) is a validated instrument that measures the physical, functional, and psychosocial concerns pertinent to patients with extremity lymphedema. The Scale's questionnaire is administered to patients to gauge impairments, activity limitations, and participation restrictions resulting from their lymphedema. Score 0 1-13 14-26 27-40 41-54 55-67 68   Modifier CH CI CJ CK CL CM CN     ? Other PT/OT Primary Functional Limitations:     - CURRENT STATUS: CK - 40%-59% impaired, limited or restricted    - GOAL STATUS: CJ - 20%-39% impaired, limited or restricted    - D/C STATUS:  ---------------To be determined---------------       Medical Necessity:   · Skilled intervention continues to be required due to uncontrolled swelling putting her at risk for cellulitis and interfering with LE mobility. Reason for Services/Other Comments:  · Patient continues to require skilled intervention due to inability to self manage . Clinical Decision-Making Assessment:     Use of outcome tool(s) and clinical judgement create a POC that gives a: Questionable prediction of patient's progress: MODERATE COMPLEXITY   TREATMENT:   (In addition to Assessment/Re-Assessment sessions the following treatments were rendered)    Pre-treatment Symptoms/Complaints:  Bilateral LE lymphedema - tolerated bandages with no medical complications ie no increased SOB  Pain: Initial:   Pain Intensity 1: 0  Post Session:  0   Occupational Therapy Treatments:    OT eval( x ) OT eval was completed. Pt received information on lymphedema and risk reduction/self management practices as outlined by the [employer]ymp[employer]. Therapeutic Exercise ( minutes):     HEP:  As above; handouts given to patient for all exercises.   Manual Therapy:60 min  Skin care/inspection, MLD treatment, compression bandaging, pt education Manual Lymph Drainage:          Lymph Nodes:    Cervical Supraclavicular Axillary Abdominal Inguinal Popliteal Antecubital   RIGHT []     [x]     [x]     [x]     [x]     [x]     []       LEFT []     [x]     [x]     [x]     [x]     [x]     []          Anastamoses:   Axillo-axillary Inguino-inguinal Axillo-inguinal Inguino-axillary   ANTERIOR []     []     []     [x]       POSTERIOR []     []     []     []       RIGHT []     []     []     [x]       LEFT []     []     []     [x]         Limbs:   []    RUE     []    LUE     [x]    RLE    [x]    LLE   Compression: : After skin care/inspection, a multi layered compression bandage was applied to bilateral LE's from foot to knee over Lymphesoft foam.  Pt instructed to remove if any increase in shortness of breath (she has shortness of breath with activity- under care of MD). She verbalized understanding     Treatment/Session Assessment:    · Response to Treatment:  Pt tolerated treatment session well with no medical complications. Skin was intact and addressed with Eucerin. Pt.'s goal for therapy is to \"decrease swelling so her legs won't be so heavy\". She has support system in spouse and family and has been through CDT before. She is concerned about high copay. [facility] will be here in one week for fitting training and will provide free garments for participating pts. She would like to take advantage of this opportunity for Circaid velcro garments or reduction kit. · Compliance with Program/Exercises: compliant with plan. · Recommendations/Intent for next treatment session: \"Next visit will focus on complete decongestive therapy\".   Total Treatment Duration:60min  OT Patient Time In/Time Out  Time In: 0100  Time Out: [address], OT

## 2018-05-28 ENCOUNTER — APPOINTMENT (OUTPATIENT)
Dept: GENERAL RADIOLOGY | Age: 83
End: 2018-05-28
Attending: EMERGENCY MEDICINE
Payer: MEDICARE

## 2018-05-28 ENCOUNTER — HOSPITAL ENCOUNTER (EMERGENCY)
Age: 83
Discharge: HOME | End: 2018-05-28
Attending: EMERGENCY MEDICINE
Payer: MEDICARE

## 2018-05-28 ENCOUNTER — APPOINTMENT (OUTPATIENT)
Dept: ULTRASOUND IMAGING | Age: 83
End: 2018-05-28
Attending: EMERGENCY MEDICINE
Payer: MEDICARE

## 2018-05-28 VITALS
HEART RATE: 81 BPM | OXYGEN SATURATION: 98 % | HEIGHT: 67 IN | TEMPERATURE: 98.8 F | RESPIRATION RATE: 16 BRPM | SYSTOLIC BLOOD PRESSURE: 187 MMHG | DIASTOLIC BLOOD PRESSURE: 79 MMHG | WEIGHT: 235 LBS | BODY MASS INDEX: 36.88 KG/M2

## 2018-05-28 DIAGNOSIS — M17.11 PRIMARY OSTEOARTHRITIS OF RIGHT KNEE: ICD-10-CM

## 2018-05-28 DIAGNOSIS — M10.9 GOUTY ARTHRITIS: ICD-10-CM

## 2018-05-28 DIAGNOSIS — M25.461 KNEE EFFUSION, RIGHT: ICD-10-CM

## 2018-05-28 DIAGNOSIS — M25.561 ACUTE PAIN OF RIGHT KNEE: Primary | ICD-10-CM

## 2018-05-28 LAB
ANION GAP SERPL CALC-SCNC: 9 MMOL/L (ref 7–16)
APPEARANCE FLD: NORMAL
BASOPHILS # BLD: 0 K/UL (ref 0–0.2)
BASOPHILS NFR BLD: 0 % (ref 0–2)
BUN SERPL-MCNC: 30 MG/DL (ref 8–23)
CALCIUM SERPL-MCNC: 10.2 MG/DL (ref 8.3–10.4)
CHLORIDE SERPL-SCNC: 104 MMOL/L (ref 98–107)
CO2 SERPL-SCNC: 28 MMOL/L (ref 21–32)
COLOR FLD: YELLOW
CREAT SERPL-MCNC: 1.45 MG/DL (ref 0.6–1)
DIFFERENTIAL METHOD BLD: ABNORMAL
EOSINOPHIL # BLD: 0 K/UL (ref 0–0.8)
EOSINOPHIL NFR BLD: 0 % (ref 0.5–7.8)
ERYTHROCYTE [DISTWIDTH] IN BLOOD BY AUTOMATED COUNT: 15.6 % (ref 11.9–14.6)
GLUCOSE SERPL-MCNC: 92 MG/DL (ref 65–100)
HCT VFR BLD AUTO: 34.7 % (ref 35.8–46.3)
HGB BLD-MCNC: 10.9 G/DL (ref 11.7–15.4)
IMM GRANULOCYTES # BLD: 0 K/UL (ref 0–0.5)
IMM GRANULOCYTES NFR BLD AUTO: 0 % (ref 0–5)
LYMPHOCYTES # BLD: 0.8 K/UL (ref 0.5–4.6)
LYMPHOCYTES NFR BLD: 8 % (ref 13–44)
LYMPHOCYTES NFR FLD: 1 %
MCH RBC QN AUTO: 28.3 PG (ref 26.1–32.9)
MCHC RBC AUTO-ENTMCNC: 31.4 G/DL (ref 31.4–35)
MCV RBC AUTO: 90.1 FL (ref 79.6–97.8)
MONOCYTES # BLD: 1.2 K/UL (ref 0.1–1.3)
MONOCYTES NFR BLD: 12 % (ref 4–12)
MONOS+MACROS NFR FLD: 7 %
NEUTROPHILS NFR FLD: 92 %
NEUTS SEG # BLD: 7.5 K/UL (ref 1.7–8.2)
NEUTS SEG NFR BLD: 80 % (ref 43–78)
NUC CELL # FLD: NORMAL /CU MM
PLATELET # BLD AUTO: 131 K/UL (ref 150–450)
PMV BLD AUTO: 12.1 FL (ref 10.8–14.1)
POTASSIUM SERPL-SCNC: 4.1 MMOL/L (ref 3.5–5.1)
RBC # BLD AUTO: 3.85 M/UL (ref 4.05–5.25)
RBC # FLD: NORMAL /CU MM
SODIUM SERPL-SCNC: 141 MMOL/L (ref 136–145)
SPECIMEN SOURCE FLD: NORMAL
URATE SERPL-MCNC: 11.1 MG/DL (ref 2.6–6)
WBC # BLD AUTO: 9.5 K/UL (ref 4.3–11.1)

## 2018-05-28 PROCEDURE — 71045 X-RAY EXAM CHEST 1 VIEW: CPT

## 2018-05-28 PROCEDURE — 85025 COMPLETE CBC W/AUTO DIFF WBC: CPT | Performed by: EMERGENCY MEDICINE

## 2018-05-28 PROCEDURE — 96374 THER/PROPH/DIAG INJ IV PUSH: CPT | Performed by: EMERGENCY MEDICINE

## 2018-05-28 PROCEDURE — 96376 TX/PRO/DX INJ SAME DRUG ADON: CPT | Performed by: EMERGENCY MEDICINE

## 2018-05-28 PROCEDURE — 99284 EMERGENCY DEPT VISIT MOD MDM: CPT | Performed by: EMERGENCY MEDICINE

## 2018-05-28 PROCEDURE — 89060 EXAM SYNOVIAL FLUID CRYSTALS: CPT | Performed by: EMERGENCY MEDICINE

## 2018-05-28 PROCEDURE — 75810000054 HC ARTHOCENTISIS JOINT: Performed by: EMERGENCY MEDICINE

## 2018-05-28 PROCEDURE — 96375 TX/PRO/DX INJ NEW DRUG ADDON: CPT | Performed by: EMERGENCY MEDICINE

## 2018-05-28 PROCEDURE — 84550 ASSAY OF BLOOD/URIC ACID: CPT | Performed by: EMERGENCY MEDICINE

## 2018-05-28 PROCEDURE — 89050 BODY FLUID CELL COUNT: CPT | Performed by: EMERGENCY MEDICINE

## 2018-05-28 PROCEDURE — 93971 EXTREMITY STUDY: CPT

## 2018-05-28 PROCEDURE — 73562 X-RAY EXAM OF KNEE 3: CPT

## 2018-05-28 PROCEDURE — 80048 BASIC METABOLIC PNL TOTAL CA: CPT | Performed by: EMERGENCY MEDICINE

## 2018-05-28 PROCEDURE — 74011250636 HC RX REV CODE- 250/636: Performed by: EMERGENCY MEDICINE

## 2018-05-28 PROCEDURE — 87205 SMEAR GRAM STAIN: CPT | Performed by: EMERGENCY MEDICINE

## 2018-05-28 RX ORDER — DEXAMETHASONE SODIUM PHOSPHATE 100 MG/10ML
10 INJECTION INTRAMUSCULAR; INTRAVENOUS
Status: COMPLETED | OUTPATIENT
Start: 2018-05-28 | End: 2018-05-28

## 2018-05-28 RX ORDER — MORPHINE SULFATE 10 MG/ML
2 INJECTION, SOLUTION INTRAMUSCULAR; INTRAVENOUS
Status: DISCONTINUED | OUTPATIENT
Start: 2018-05-28 | End: 2018-05-29 | Stop reason: HOSPADM

## 2018-05-28 RX ORDER — INDOMETHACIN 25 MG/1
25 CAPSULE ORAL 3 TIMES DAILY
Qty: 15 CAP | Refills: 0 | Status: SHIPPED | OUTPATIENT
Start: 2018-05-28 | End: 2018-06-02

## 2018-05-28 RX ORDER — HYDROMORPHONE HCL IN 0.9% NACL 0.5 MG/ML
0.5 SYRINGE (ML) INTRAVENOUS
Status: COMPLETED | OUTPATIENT
Start: 2018-05-28 | End: 2018-05-28

## 2018-05-28 RX ORDER — HYDROCODONE BITARTRATE AND ACETAMINOPHEN 5; 325 MG/1; MG/1
1-2 TABLET ORAL
Qty: 16 TAB | Refills: 0 | Status: SHIPPED | OUTPATIENT
Start: 2018-05-28 | End: 2018-07-23

## 2018-05-28 RX ORDER — PREDNISONE 20 MG/1
40 TABLET ORAL DAILY
Qty: 10 TAB | Refills: 0 | Status: SHIPPED | OUTPATIENT
Start: 2018-05-28 | End: 2018-06-02

## 2018-05-28 RX ORDER — HYDROMORPHONE HYDROCHLORIDE 1 MG/ML
0.5 INJECTION, SOLUTION INTRAMUSCULAR; INTRAVENOUS; SUBCUTANEOUS
Status: COMPLETED | OUTPATIENT
Start: 2018-05-28 | End: 2018-05-28

## 2018-05-28 RX ADMIN — HYDROMORPHONE HYDROCHLORIDE 0.5 MG: 1 INJECTION, SOLUTION INTRAMUSCULAR; INTRAVENOUS; SUBCUTANEOUS at 21:19

## 2018-05-28 RX ADMIN — DEXAMETHASONE SODIUM PHOSPHATE 10 MG: 10 INJECTION INTRAMUSCULAR; INTRAVENOUS at 21:19

## 2018-05-28 RX ADMIN — Medication 0.5 MG: at 18:19

## 2018-05-28 NOTE — ED PROVIDER NOTES
HPI Comments: 45-year-old female is a history of gout, diabetes, hypertension, congestive heart failure and stroke in the past.  Also history of atrial fibrillation. Patient started up with right knee pain in the last 3 weeks. Was seen here previously and an x-ray showing degenerative changes. Withdrawal from over-the-counter medications but they have not improved. Patient recently has been treated for some lymphedema. She is on maximal diuretic therapy. Recently saw occupational therapy who did lower extremity wrappings for her lymphedema. She states she could not tolerate this and since taking the wrappings off, the pain is worse. No trauma or fever    Patient is a 80 y.o. female presenting with knee pain. The history is provided by the patient. Knee Pain    This is a new problem. The current episode started more than 1 week ago. The problem occurs constantly. The problem has been gradually worsening. The pain is present in the right knee. The quality of the pain is described as aching and dull. The pain is moderate. Associated symptoms include limited range of motion. Pertinent negatives include no numbness, no back pain and no neck pain. She has tried OTC pain medications for the symptoms. There has been no history of extremity trauma. Family history is significant for gout.         Past Medical History:   Diagnosis Date    Acute gout involving toe of left foot 5/19/2017    CAD (coronary artery disease) 2007    \"irregular heart beat\"    Cardiomyopathy [facility]) 11/25/2009    Chronic indwelling Harper catheter     Chronic kidney disease 2002    on HD since Nov 2012    Chronic systolic heart failure [facility]) 2007    Colon polyps 2016    CVA (cerebral vascular accident) ([address]     CVA (cerebral vascular accident) ([address] 8/8/2016    Deep vein thrombosis (DVT) of lower extremity ([address] 5/27/2016    Diabetes ([address] 2002    Diabetes mellitus ([address] 6/20/2012    Duodenal ulcer     Duodenal ulcer 11/18/2012    Duodenal ulcer 11/18/2012    Dyslipidemia 8/8/2016    Essential hypertension 10/26/2009    [facility] failure [facility]     Hypertension 2009    Hypoglycemia, unspecified 12/3/2012    JOÃO (iron deficiency anemia)     Obesity, morbid 11/25/2009    Other ill-defined conditions(799.89) 2002    Lymphedema    Persistent atrial fibrillation ([address] 10/26/2009    Pulmonary HTN ([facility]     SBO (small bowel obstruction) ([facility] 8/8/2013    Sleep apnea 2010    non-compliant with CPAP    Sleep apnea - noncompliant with CPAP 11/13/2012    Type II diabetes mellitus, uncontrolled ([address] 6/20/2012    Urinary tract infection 12/3/2012    UTI (lower urinary tract infection) 8/8/2013    UTI (urinary tract infection) 11/23/2012       Past Surgical History:   Procedure Laterality Date    COLONOSCOPY N/A 6/20/2016    COLONOSCOPY performed by Nasir Munoz MD at [facility]    HX COLONOSCOPY  2016    polyp    HX HYSTERECTOMY  >20 years ago    HX TUBAL LIGATION  >40 years ago         Family History:   Problem Relation Age of Onset    Heart Disease Sister     Diabetes Other        Social History     Social History    Marital status:      Spouse name: N/A    Number of children: N/A    Years of education: N/A     Occupational History    Not on file. Social History Main Topics    Smoking status: Never Smoker    Smokeless tobacco: Never Used    Alcohol use No    Drug use: No    Sexual activity: Not on file     Other Topics Concern    Not on file     Social History Narrative    , lives at home with spouse. Retired day care worker         ALLERGIES: Benazepril; Dynacirc [isradipine]; and Peanut    Review of Systems   Constitutional: Positive for fever. Negative for chills. Respiratory: Positive for cough. Negative for shortness of breath. Cardiovascular: Negative for chest pain and palpitations. Gastrointestinal: Negative for abdominal pain, diarrhea and vomiting.    Musculoskeletal: Negative for back pain and neck pain. Skin: Negative for color change and rash. Neurological: Negative for numbness. All other systems reviewed and are negative. There were no vitals filed for this visit. Physical Exam   Constitutional: She is oriented to person, place, and time. She appears well-developed and well-nourished. No distress. Musculoskeletal:        Right hip: She exhibits no tenderness and no bony tenderness. Right knee: She exhibits decreased range of motion and swelling. Tenderness found. Some warmth and soft tissue swelling about the right knee. Nonspecific tenderness. Tenderness extends down into an edematous right lower leg. There are postphlebitic changes that approximate the left side as well. Neurological: She is alert and oriented to person, place, and time. Skin: Skin is warm and dry. Nursing note and vitals reviewed. MDM  Number of Diagnoses or Management Options  Diagnosis management comments: Suspected worsening of arthritis and possibly pain induced by occupational therapy. We'll assess for DVT. Given history of gout, check uric acid       Amount and/or Complexity of Data Reviewed  Clinical lab tests: ordered and reviewed  Tests in the radiology section of CPT®: ordered and reviewed  Review and summarize past medical records: yes (Recent cardiology visit were patient is maximized on diuretic therapy.   Recent OT records regarding wrappings of lower extremities.)  Independent visualization of images, tracings, or specimens: yes    Risk of Complications, Morbidity, and/or Mortality  Presenting problems: moderate  Diagnostic procedures: minimal    Patient Progress  Patient progress: stable        ED Course       Arthrocentesis  Date/Time: 5/28/2018 9:07 PM  Performed by: Nancy Parra by: Ne Alfaro     Consent:     Consent obtained:  Written    Consent given by:  Patient    Risks discussed:  Infection, bleeding and nerve damage Alternatives discussed:  No treatment  Location:     Location:  Knee    Knee:  R knee  Anesthesia (see MAR for exact dosages): Anesthesia method:  Local infiltration    Local anesthetic:  Lidocaine 1% w/o epi  Procedure details:     Preparation: Patient was prepped and draped in usual sterile fashion      Needle gauge:  18 G    Approach:  Medial    Aspirate characteristics:  Clear    Steroid injected: no      Specimen collected: yes    Post-procedure details:     Dressing:  Adhesive bandage    Patient tolerance of procedure: Tolerated well, no immediate complications  Comments:      ~ 50 cc of clear yellow synovial fluid obtained. Results Include:    Recent Results (from the past 24 hour(s))   CBC WITH AUTOMATED DIFF    Collection Time: 05/28/18  6:08 PM   Result Value Ref Range    WBC 9.5 4.3 - 11.1 K/uL    RBC 3.85 (L) 4.05 - 5.25 M/uL    HGB 10.9 (L) 11.7 - 15.4 g/dL    HCT 34.7 (L) 35.8 - 46.3 %    MCV 90.1 79.6 - 97.8 FL    MCH 28.3 26.1 - 32.9 PG    MCHC 31.4 31.4 - 35.0 g/dL    RDW 15.6 (H) 11.9 - 14.6 %    PLATELET 653 (L) 619 - 450 K/uL    MPV 12.1 10.8 - 14.1 FL    DF AUTOMATED      NEUTROPHILS 80 (H) 43 - 78 %    LYMPHOCYTES 8 (L) 13 - 44 %    MONOCYTES 12 4.0 - 12.0 %    EOSINOPHILS 0 (L) 0.5 - 7.8 %    BASOPHILS 0 0.0 - 2.0 %    IMMATURE GRANULOCYTES 0 0.0 - 5.0 %    ABS. NEUTROPHILS 7.5 1.7 - 8.2 K/UL    ABS. LYMPHOCYTES 0.8 0.5 - 4.6 K/UL    ABS. MONOCYTES 1.2 0.1 - 1.3 K/UL    ABS. EOSINOPHILS 0.0 0.0 - 0.8 K/UL    ABS. BASOPHILS 0.0 0.0 - 0.2 K/UL    ABS. IMM.  GRANS. 0.0 0.0 - 0.5 K/UL   METABOLIC PANEL, BASIC    Collection Time: 05/28/18  6:08 PM   Result Value Ref Range    Sodium 141 136 - 145 mmol/L    Potassium 4.1 3.5 - 5.1 mmol/L    Chloride 104 98 - 107 mmol/L    CO2 28 21 - 32 mmol/L    Anion gap 9 7 - 16 mmol/L    Glucose 92 65 - 100 mg/dL    BUN 30 (H) 8 - 23 MG/DL    Creatinine 1.45 (H) 0.6 - 1.0 MG/DL    GFR est AA 44 (L) >60 ml/min/1.73m2    GFR est non-AA 37 (L) >60 ml/min/1.73m2    Calcium 10.2 8.3 - 10.4 MG/DL   URIC ACID    Collection Time: 05/28/18  6:08 PM   Result Value Ref Range    Uric acid 11.1 (H) 2.6 - 6.0 MG/DL     Xr Chest Sngl V    Result Date: 5/28/2018  Portable chest: 05/28/2018 History: Cough, fever. Comparison: 07/26/2016 Findings: A single view of the chest was obtained at 1647 hours. The cardiac silhouette is moderately enlarged, unchanged. The lungs and pleural spaces are grossly clear. The pulmonary vascularity is within normal limits. Impression: Enlarged cardiac silhouette with grossly clear lungs. Xr Knee Rt 3 V    Result Date: 5/28/2018  Right knee series. HISTORY: Right knee pain x1 week. No known injury. 3 views of the right knee were obtained. Comparison is made to the prior exam dated 05/12/2008 FINDINGS: There are advanced tricompartmental degenerative changes. There is no evidence of acute fracture or dislocation. There is a moderate joint effusion. There is no bony destruction. There is diffuse osteopenia. Calcifications within the right calf are partially visualized perhaps vascular or posttraumatic in origin. IMPRESSION: 1. Advanced tricompartmental degenerative change. 2. Joint effusion. 3. Soft tissue calcifications. Duplex Lower Ext Venous Right    Result Date: 5/28/2018  Right lower extremity Doppler evaluation: 05/28/2018 HISTORY: Swelling x1 week and pain x3 weeks. . Grayscale, color-flow and Doppler evaluation of the major veins of the right lower extremity was performed. Comparison: 08/03/2016 FINDINGS: There is normal compression and augmentation involving the right common femoral, superficial femoral and popliteal veins. No grayscale or color-flow findings within these vessels or within the distal greater saphenous or profunda femoral veins were noted to suggest deep venous thrombosis. Interrogated portions of the peroneal and posterior tibial veins were also unremarkable. No popliteal cyst is identified.  Cursory imaging of the left common femoral vein reveals it to be patent with normal color-flow and augmentation. IMPRESSION: Negative evaluation for deep venous thrombosis within the right lower extremity. No evidence for septic joint. I believe pain most likely due to gallop that could be due to osteoarthritis and contributed to by lymphedema. All aspirin / input. Will ask to call family tomorrow. Concerns regarding home physical therapy and possible hospital bed. Also assistance in setting up orthopedic appointment.

## 2018-05-28 NOTE — ED TRIAGE NOTES
Pt arrived via [facility]. Pt states having right knee pain for the past 3-4 weeks. Denies any injury. Family states the swelling in her legs has gone down. Pt was seen here recently for the same complaint and states it has gotten worse.

## 2018-05-29 ENCOUNTER — HOME HEALTH ADMISSION (OUTPATIENT)
Dept: HOME HEALTH SERVICES | Facility: HOME HEALTH | Age: 83
End: 2018-05-29
Payer: MEDICARE

## 2018-05-29 ENCOUNTER — HOSPITAL ENCOUNTER (OUTPATIENT)
Dept: PHYSICAL THERAPY | Age: 83
Discharge: HOME | End: 2018-05-29
Payer: MEDICARE

## 2018-05-29 NOTE — PROGRESS NOTES
[address]  Face to Face Encounter    Patients Name: James Armstrong    YOB: 1935    Ordering Physician: Dr. Watson Rausch    Primary Diagnosis: weakness, lymphedema    Date of Face to Face:   5/28/2018                                  Face to Face Encounter findings are related to primary reason for home care:   yes. 1. I certify that the patient needs intermittent care as follows: skilled nursing care:  skilled observation/assessment, patient education  physical therapy: strengthening, stretching/ROM, transfer training and gait/stair training  occupational therapy:  ADL safety (ie. cooking, bathing, dressing), ROM and pt/caregiver education    2. I certify that this patient is homebound, that is: 1) patient requires the use of a wheelchair device, special transportation, or assistance of another to leave the home; or 2) patient's condition makes leaving the home medically contraindicated; and 3) patient has a normal inability to leave the home and leaving the home requires considerable and taxing effort. Patient may leave the home for infrequent and short duration for medical reasons, and occasional absences for non-medical reasons. Homebound status is due to the following functional limitations: Patient with strength deficits limiting the performance of all ADL's without caregiver assistance or the use of an assistive device. Patient with poor safety awareness and is at risk for falls without assistance of another person and the use of an assistive device. Patient with poor ambulation endurance limiting their safe ability to ascend/descend the required number of steps to leave the home. 3. I certify that this patient is under my care and that I, or a nurse practitioner or  [id number], or clinical nurse specialist, or certified nurse midwife, working with me, had a Face-to-Face Encounter that meets the physician Face-to-Face Encounter requirements.   The following are the clinical findings from the [facility] encounter that support the need for skilled services and is a summary of the encounter:     See attached progess note      Dorene Jensen RN  5/29/2018      THE FOLLOWING TO BE COMPLETED BY THE COMMUNITY PHYSICIAN:    I concur with the findings described above from the F2F encounter that this patient is homebound and in need of a skilled service.     Certifying Physician: _____________________________________      Printed Certifying Physician Name: _____________________________________    Date: _________________

## 2018-05-29 NOTE — PROGRESS NOTES
OUTPATIENT DAILY NOTE    NAME/AGE/GENDER: Manas Eden is a 80 y.o. female. DATE: 5/29/2018    Patient cancelled for appointment today due to illness. Will plan to follow up on next scheduled visit.     Steve Elena, OT

## 2018-05-29 NOTE — DISCHARGE INSTRUCTIONS
Wrist.  May try heating pad or hot water bottle to the knee. Call to speak with  tomorrow at [phone] regarding home physical therapy and hospital bed. Call orthopedist for follow-up appointment. Recheck for high fever or rash. Arthritis: Care Instructions  Your Care Instructions  Arthritis, also called osteoarthritis, is a breakdown of the cartilage that cushions your joints. When the cartilage wears down, your bones rub against each other. This causes pain and stiffness. Many people have some arthritis as they age. Arthritis most often affects the joints of the spine, hands, hips, knees, or feet. You can take simple measures to protect your joints, ease your pain, and help you stay active. Follow-up care is a key part of your treatment and safety. Be sure to make and go to all appointments, and call your doctor if you are having problems. It's also a good idea to know your test results and keep a list of the medicines you take. How can you care for yourself at home? · Stay at a healthy weight. Being overweight puts extra strain on your joints. · Talk to your doctor or physical therapist about exercises that will help ease joint pain. ¨ Stretch. You may enjoy gentle forms of yoga to help keep your joints and muscles flexible. ¨ Walk instead of jog. Other types of exercise that are less stressful on the joints include riding a bicycle, swimming, jerome chi, or water exercise. ¨ Lift weights. Strong muscles help reduce stress on your joints. Stronger thigh muscles, for example, take some of the stress off of the knees and hips. Learn the right way to lift weights so you do not make joint pain worse. · Take your medicines exactly as prescribed. Call your doctor if you think you are having a problem with your medicine. · Take pain medicines exactly as directed. ¨ If the doctor gave you a prescription medicine for pain, take it as prescribed.   ¨ If you are not taking a prescription pain medicine, ask your doctor if you can take an over-the-counter medicine. · Use a cane, crutch, walker, or another device if you need help to get around. These can help rest your joints. You also can use other things to make life easier, such as a higher toilet seat and padded handles on kitchen utensils. · Do not sit in low chairs, which can make it hard to get up. · Put heat or cold on your sore joints as needed. Use whichever helps you most. You also can take turns with hot and cold packs. ¨ Apply heat 2 or 3 times a day for 20 to 30 minutes-using a heating pad, hot shower, or hot pack-to relieve pain and stiffness. ¨ Put ice or a cold pack on your sore joint for 10 to 20 minutes at a time. Put a thin cloth between the ice and your skin. When should you call for help? Call your doctor now or seek immediate medical care if:  ? · You have sudden swelling, warmth, or pain in any joint. ? · You have joint pain and a fever or rash. ? · You have such bad pain that you cannot use a joint. ? Watch closely for changes in your health, and be sure to contact your doctor if:  ? · You have mild joint symptoms that continue even with more than 6 weeks of care at home. ? · You have stomach pain or other problems with your medicine. Where can you learn more? Go to http://manny-barbara.info/. Enter I242 in the search box to learn more about \"Arthritis: Care Instructions. \"  Current as of: October 31, 2016  Content Version: 11.4  © 0310-7262 [employer]. Care instructions adapted under license by [employer] (which disclaims liability or warranty for this information). If you have questions about a medical condition or this instruction, always ask your healthcare professional. [address] any warranty or liability for your use of this information.          Gout: Care Instructions  Your Care Instructions    Gout is a form of arthritis caused by a buildup of uric acid crystals in a joint. It causes sudden attacks of pain, swelling, redness, and stiffness, usually in one joint, especially the big toe. Gout usually comes on without a cause. But it can be brought on by drinking alcohol (especially beer) or eating seafood and red meat. Taking certain medicines, such as diuretics or aspirin, also can bring on an attack of gout. Taking your medicines as prescribed and following up with your doctor regularly can help you avoid gout attacks in the future. Follow-up care is a key part of your treatment and safety. Be sure to make and go to all appointments, and call your doctor if you are having problems. It's also a good idea to know your test results and keep a list of the medicines you take. How can you care for yourself at home? · If the joint is swollen, put ice or a cold pack on the area for 10 to 20 minutes at a time. Put a thin cloth between the ice and your skin. · Prop up the sore limb on a pillow when you ice it or anytime you sit or lie down during the next 3 days. Try to keep it above the level of your heart. This will help reduce swelling. · Rest sore joints. Avoid activities that put weight or strain on the joints for a few days. Take short rest breaks from your regular activities during the day. · Take your medicines exactly as prescribed. Call your doctor if you think you are having a problem with your medicine. · Take pain medicines exactly as directed. ¨ If the doctor gave you a prescription medicine for pain, take it as prescribed. ¨ If you are not taking a prescription pain medicine, ask your doctor if you can take an over-the-counter medicine. · Eat less seafood and red meat. · Check with your doctor before drinking alcohol. · Losing weight, if you are overweight, may help reduce attacks of gout. But do not go on a [employer]. \" Losing a lot of weight in a short amount of time can cause a gout attack. When should you call for help?   Call your doctor now or seek immediate medical care if:  ? · You have a fever. ? · The joint is so painful you cannot use it. ? · You have sudden, unexplained swelling, redness, warmth, or severe pain in one or more joints. ? Watch closely for changes in your health, and be sure to contact your doctor if:  ? · You have joint pain. ? · Your symptoms get worse or are not improving after 2 or 3 days. Where can you learn more? Go to http://manny-barbara.info/. Enter M893 in the search box to learn more about \"Gout: Care Instructions. \"  Current as of: October 31, 2016  Content Version: 11.4  © 3249-4334 [employer]. Care instructions adapted under license by [employer] (which disclaims liability or warranty for this information). If you have questions about a medical condition or this instruction, always ask your healthcare professional. [address] any warranty or liability for your use of this information.

## 2018-05-29 NOTE — PROGRESS NOTES
Spoke with patient's daughter via telephone (per MD request) to discuss [facility]. Per daughter, patient was getting outpt therapy for lymphedema (wraps). Per daughter, patient no longer able to get out of the home to go to therapy. Per Dr. Graciela Espinosa / set up [facility]. Daughter agreeable to [facility]. Per [facility] liaison), they would need to cancel outpt therapy. Daughter states she will call and cancel outpt tx. Referral entry, order completed.

## 2018-05-29 NOTE — ED NOTES
I have reviewed discharge instructions with the patient. The patient verbalized understanding. Patient left ED via Discharge Method: ambulatory to Home with family. Opportunity for questions and clarification provided. Patient given 2 scripts. To continue your aftercare when you leave the hospital, you may receive an automated call from our care team to check in on how you are doing. This is a free service and part of our promise to provide the best care and service to meet your aftercare needs.  If you have questions, or wish to unsubscribe from this service please call [phone]. Thank you for Choosing our [employer] [facility].

## 2018-05-30 LAB
BODY FLD TYPE: NORMAL
CRYSTALS FLD MICRO: NORMAL

## 2018-05-31 ENCOUNTER — HOSPITAL ENCOUNTER (OUTPATIENT)
Dept: PHYSICAL THERAPY | Age: 83
Discharge: HOME | End: 2018-05-31
Payer: MEDICARE

## 2018-05-31 LAB
BACTERIA SPEC CULT: NORMAL
GRAM STN SPEC: NORMAL
GRAM STN SPEC: NORMAL
SERVICE CMNT-IMP: NORMAL

## 2018-05-31 NOTE — PROGRESS NOTES
OUTPATIENT DAILY NOTE    NAME/AGE/GENDER: Cristin Joseph is a 80 y.o. female. DATE: 5/31/2018    Patient cancelled for today. She will be discharged from OT as she is now receiving home health therapy. Will plan to follow up on next scheduled visit.     Jessica Botello, OT

## 2018-06-04 ENCOUNTER — HOME CARE VISIT (OUTPATIENT)
Dept: SCHEDULING | Facility: HOME HEALTH | Age: 83
End: 2018-06-04
Payer: MEDICARE

## 2018-06-04 PROCEDURE — G0299 HHS/HOSPICE OF RN EA 15 MIN: HCPCS

## 2018-06-04 PROCEDURE — 3331090001 HH PPS REVENUE CREDIT

## 2018-06-04 PROCEDURE — 3331090002 HH PPS REVENUE DEBIT

## 2018-06-04 PROCEDURE — 400013 HH SOC

## 2018-06-05 VITALS
OXYGEN SATURATION: 93 % | DIASTOLIC BLOOD PRESSURE: 70 MMHG | TEMPERATURE: 98.1 F | HEART RATE: 62 BPM | SYSTOLIC BLOOD PRESSURE: 138 MMHG | RESPIRATION RATE: 19 BRPM

## 2018-06-05 PROCEDURE — 3331090001 HH PPS REVENUE CREDIT

## 2018-06-05 PROCEDURE — 3331090002 HH PPS REVENUE DEBIT

## 2018-06-06 ENCOUNTER — HOME CARE VISIT (OUTPATIENT)
Dept: SCHEDULING | Facility: HOME HEALTH | Age: 83
End: 2018-06-06
Payer: MEDICARE

## 2018-06-06 VITALS
TEMPERATURE: 97.3 F | DIASTOLIC BLOOD PRESSURE: 62 MMHG | RESPIRATION RATE: 16 BRPM | SYSTOLIC BLOOD PRESSURE: 136 MMHG | HEART RATE: 56 BPM

## 2018-06-06 VITALS — TEMPERATURE: 97.3 F | HEART RATE: 56 BPM | DIASTOLIC BLOOD PRESSURE: 62 MMHG | SYSTOLIC BLOOD PRESSURE: 136 MMHG

## 2018-06-06 PROCEDURE — G0151 HHCP-SERV OF PT,EA 15 MIN: HCPCS

## 2018-06-06 PROCEDURE — 3331090001 HH PPS REVENUE CREDIT

## 2018-06-06 PROCEDURE — 3331090002 HH PPS REVENUE DEBIT

## 2018-06-06 PROCEDURE — G0152 HHCP-SERV OF OT,EA 15 MIN: HCPCS

## 2018-06-07 ENCOUNTER — HOME CARE VISIT (OUTPATIENT)
Dept: SCHEDULING | Facility: HOME HEALTH | Age: 83
End: 2018-06-07
Payer: MEDICARE

## 2018-06-07 PROCEDURE — 3331090002 HH PPS REVENUE DEBIT

## 2018-06-07 PROCEDURE — 3331090001 HH PPS REVENUE CREDIT

## 2018-06-08 PROCEDURE — 3331090002 HH PPS REVENUE DEBIT

## 2018-06-08 PROCEDURE — 3331090001 HH PPS REVENUE CREDIT

## 2018-06-09 PROCEDURE — 3331090002 HH PPS REVENUE DEBIT

## 2018-06-09 PROCEDURE — 3331090001 HH PPS REVENUE CREDIT

## 2018-06-10 PROCEDURE — 3331090002 HH PPS REVENUE DEBIT

## 2018-06-10 PROCEDURE — 3331090001 HH PPS REVENUE CREDIT

## 2018-06-11 ENCOUNTER — HOME CARE VISIT (OUTPATIENT)
Dept: SCHEDULING | Facility: HOME HEALTH | Age: 83
End: 2018-06-11
Payer: MEDICARE

## 2018-06-11 VITALS
RESPIRATION RATE: 16 BRPM | SYSTOLIC BLOOD PRESSURE: 144 MMHG | TEMPERATURE: 98 F | OXYGEN SATURATION: 98 % | DIASTOLIC BLOOD PRESSURE: 82 MMHG | HEART RATE: 90 BPM

## 2018-06-11 VITALS — SYSTOLIC BLOOD PRESSURE: 130 MMHG | HEART RATE: 60 BPM | TEMPERATURE: 97 F | DIASTOLIC BLOOD PRESSURE: 64 MMHG

## 2018-06-11 PROCEDURE — 3331090002 HH PPS REVENUE DEBIT

## 2018-06-11 PROCEDURE — 3331090001 HH PPS REVENUE CREDIT

## 2018-06-11 PROCEDURE — G0152 HHCP-SERV OF OT,EA 15 MIN: HCPCS

## 2018-06-11 PROCEDURE — G0299 HHS/HOSPICE OF RN EA 15 MIN: HCPCS

## 2018-06-12 ENCOUNTER — HOME CARE VISIT (OUTPATIENT)
Dept: SCHEDULING | Facility: HOME HEALTH | Age: 83
End: 2018-06-12
Payer: MEDICARE

## 2018-06-12 VITALS
HEART RATE: 78 BPM | RESPIRATION RATE: 17 BRPM | DIASTOLIC BLOOD PRESSURE: 76 MMHG | OXYGEN SATURATION: 98 % | SYSTOLIC BLOOD PRESSURE: 128 MMHG

## 2018-06-12 PROCEDURE — 3331090001 HH PPS REVENUE CREDIT

## 2018-06-12 PROCEDURE — G0151 HHCP-SERV OF PT,EA 15 MIN: HCPCS

## 2018-06-12 PROCEDURE — 3331090002 HH PPS REVENUE DEBIT

## 2018-06-13 ENCOUNTER — HOME CARE VISIT (OUTPATIENT)
Dept: SCHEDULING | Facility: HOME HEALTH | Age: 83
End: 2018-06-13
Payer: MEDICARE

## 2018-06-13 VITALS — HEART RATE: 93 BPM | SYSTOLIC BLOOD PRESSURE: 140 MMHG | DIASTOLIC BLOOD PRESSURE: 78 MMHG | TEMPERATURE: 97.9 F

## 2018-06-13 PROCEDURE — 3331090001 HH PPS REVENUE CREDIT

## 2018-06-13 PROCEDURE — G0299 HHS/HOSPICE OF RN EA 15 MIN: HCPCS

## 2018-06-13 PROCEDURE — 3331090002 HH PPS REVENUE DEBIT

## 2018-06-13 PROCEDURE — G0152 HHCP-SERV OF OT,EA 15 MIN: HCPCS

## 2018-06-14 ENCOUNTER — HOME CARE VISIT (OUTPATIENT)
Dept: SCHEDULING | Facility: HOME HEALTH | Age: 83
End: 2018-06-14
Payer: MEDICARE

## 2018-06-14 VITALS
RESPIRATION RATE: 18 BRPM | SYSTOLIC BLOOD PRESSURE: 130 MMHG | OXYGEN SATURATION: 98 % | DIASTOLIC BLOOD PRESSURE: 72 MMHG | TEMPERATURE: 97.8 F | HEART RATE: 72 BPM

## 2018-06-14 VITALS
OXYGEN SATURATION: 97 % | RESPIRATION RATE: 17 BRPM | DIASTOLIC BLOOD PRESSURE: 76 MMHG | SYSTOLIC BLOOD PRESSURE: 130 MMHG | HEART RATE: 74 BPM

## 2018-06-14 PROCEDURE — G0151 HHCP-SERV OF PT,EA 15 MIN: HCPCS

## 2018-06-14 PROCEDURE — 3331090002 HH PPS REVENUE DEBIT

## 2018-06-14 PROCEDURE — 3331090001 HH PPS REVENUE CREDIT

## 2018-06-15 PROCEDURE — 3331090001 HH PPS REVENUE CREDIT

## 2018-06-15 PROCEDURE — 3331090002 HH PPS REVENUE DEBIT

## 2018-06-16 PROCEDURE — 3331090002 HH PPS REVENUE DEBIT

## 2018-06-16 PROCEDURE — 3331090001 HH PPS REVENUE CREDIT

## 2018-06-17 PROCEDURE — 3331090002 HH PPS REVENUE DEBIT

## 2018-06-17 PROCEDURE — 3331090001 HH PPS REVENUE CREDIT

## 2018-06-18 ENCOUNTER — HOME CARE VISIT (OUTPATIENT)
Dept: SCHEDULING | Facility: HOME HEALTH | Age: 83
End: 2018-06-18
Payer: MEDICARE

## 2018-06-18 VITALS — TEMPERATURE: 97.8 F | DIASTOLIC BLOOD PRESSURE: 64 MMHG | HEART RATE: 90 BPM | SYSTOLIC BLOOD PRESSURE: 132 MMHG

## 2018-06-18 PROCEDURE — 3331090001 HH PPS REVENUE CREDIT

## 2018-06-18 PROCEDURE — G0152 HHCP-SERV OF OT,EA 15 MIN: HCPCS

## 2018-06-18 PROCEDURE — 3331090002 HH PPS REVENUE DEBIT

## 2018-06-19 ENCOUNTER — HOME CARE VISIT (OUTPATIENT)
Dept: SCHEDULING | Facility: HOME HEALTH | Age: 83
End: 2018-06-19
Payer: MEDICARE

## 2018-06-19 VITALS
HEART RATE: 80 BPM | SYSTOLIC BLOOD PRESSURE: 120 MMHG | RESPIRATION RATE: 17 BRPM | OXYGEN SATURATION: 98 % | DIASTOLIC BLOOD PRESSURE: 76 MMHG

## 2018-06-19 PROCEDURE — 3331090001 HH PPS REVENUE CREDIT

## 2018-06-19 PROCEDURE — G0151 HHCP-SERV OF PT,EA 15 MIN: HCPCS

## 2018-06-19 PROCEDURE — 3331090002 HH PPS REVENUE DEBIT

## 2018-06-20 ENCOUNTER — HOME CARE VISIT (OUTPATIENT)
Dept: SCHEDULING | Facility: HOME HEALTH | Age: 83
End: 2018-06-20
Payer: MEDICARE

## 2018-06-20 PROCEDURE — G0152 HHCP-SERV OF OT,EA 15 MIN: HCPCS

## 2018-06-20 PROCEDURE — 3331090001 HH PPS REVENUE CREDIT

## 2018-06-20 PROCEDURE — 3331090002 HH PPS REVENUE DEBIT

## 2018-06-21 ENCOUNTER — HOME CARE VISIT (OUTPATIENT)
Dept: SCHEDULING | Facility: HOME HEALTH | Age: 83
End: 2018-06-21
Payer: MEDICARE

## 2018-06-21 PROCEDURE — G0151 HHCP-SERV OF PT,EA 15 MIN: HCPCS

## 2018-06-21 PROCEDURE — 3331090002 HH PPS REVENUE DEBIT

## 2018-06-21 PROCEDURE — 3331090001 HH PPS REVENUE CREDIT

## 2018-06-22 ENCOUNTER — HOME CARE VISIT (OUTPATIENT)
Dept: SCHEDULING | Facility: HOME HEALTH | Age: 83
End: 2018-06-22
Payer: MEDICARE

## 2018-06-22 PROCEDURE — 3331090002 HH PPS REVENUE DEBIT

## 2018-06-22 PROCEDURE — 3331090001 HH PPS REVENUE CREDIT

## 2018-06-23 PROCEDURE — 3331090002 HH PPS REVENUE DEBIT

## 2018-06-23 PROCEDURE — 3331090001 HH PPS REVENUE CREDIT

## 2018-06-24 PROCEDURE — 3331090001 HH PPS REVENUE CREDIT

## 2018-06-24 PROCEDURE — 3331090002 HH PPS REVENUE DEBIT

## 2018-06-25 PROCEDURE — 3331090002 HH PPS REVENUE DEBIT

## 2018-06-25 PROCEDURE — 3331090001 HH PPS REVENUE CREDIT

## 2018-06-26 ENCOUNTER — HOME CARE VISIT (OUTPATIENT)
Dept: SCHEDULING | Facility: HOME HEALTH | Age: 83
End: 2018-06-26
Payer: MEDICARE

## 2018-06-26 VITALS
HEART RATE: 79 BPM | SYSTOLIC BLOOD PRESSURE: 124 MMHG | OXYGEN SATURATION: 97 % | RESPIRATION RATE: 17 BRPM | DIASTOLIC BLOOD PRESSURE: 76 MMHG

## 2018-06-26 PROCEDURE — G0151 HHCP-SERV OF PT,EA 15 MIN: HCPCS

## 2018-06-26 PROCEDURE — 3331090001 HH PPS REVENUE CREDIT

## 2018-06-26 PROCEDURE — 3331090002 HH PPS REVENUE DEBIT

## 2018-06-27 ENCOUNTER — HOME CARE VISIT (OUTPATIENT)
Dept: SCHEDULING | Facility: HOME HEALTH | Age: 83
End: 2018-06-27
Payer: MEDICARE

## 2018-06-27 VITALS
OXYGEN SATURATION: 98 % | RESPIRATION RATE: 16 BRPM | TEMPERATURE: 97.6 F | HEART RATE: 88 BPM | SYSTOLIC BLOOD PRESSURE: 112 MMHG | DIASTOLIC BLOOD PRESSURE: 80 MMHG

## 2018-06-27 PROCEDURE — 3331090002 HH PPS REVENUE DEBIT

## 2018-06-27 PROCEDURE — 3331090001 HH PPS REVENUE CREDIT

## 2018-06-27 PROCEDURE — G0299 HHS/HOSPICE OF RN EA 15 MIN: HCPCS

## 2018-06-28 ENCOUNTER — HOME CARE VISIT (OUTPATIENT)
Dept: SCHEDULING | Facility: HOME HEALTH | Age: 83
End: 2018-06-28
Payer: MEDICARE

## 2018-06-28 VITALS
DIASTOLIC BLOOD PRESSURE: 76 MMHG | RESPIRATION RATE: 17 BRPM | HEART RATE: 80 BPM | OXYGEN SATURATION: 96 % | SYSTOLIC BLOOD PRESSURE: 124 MMHG

## 2018-06-28 PROCEDURE — 3331090003 HH PPS REVENUE ADJ

## 2018-06-28 PROCEDURE — 3331090002 HH PPS REVENUE DEBIT

## 2018-06-28 PROCEDURE — 3331090001 HH PPS REVENUE CREDIT

## 2018-06-28 PROCEDURE — G0151 HHCP-SERV OF PT,EA 15 MIN: HCPCS

## 2018-07-02 NOTE — THERAPY DISCHARGE
Yan Law  : 1935  Primary: Yamel Cisneros Medicare Hmo  Secondary:  [address] at [facility]  [address], [address]  [phone]   [phone]           OUTPATIENT OCCUPATIONAL THERAPY: Discontinuation Summary 2018    ICD-10: Treatment Diagnosis: Q82.0 hereditary or primary lymphedema, R26.2 difficulty in walking, not elsewhere classified, G89.0 pain, not elsewhere classified   Precautions/Allergies:   Benazepril; Dynacirc [isradipine]; Peanut; and Morphine     Fall Risk Score: 1 (? 5 = High Risk)  MD Orders: eval and treat MEDICAL/REFERRING DIAGNOSIS:   Localized edema [R60.0]  Lymphedema, not elsewhere classified [I89.0]   DATE OF ONSET: lifelong , worsening symptoms over the last 5-10 years    REFERRING PHYSICIAN: Kalpana Cruz MD  RETURN PHYSICIAN APPOINTMENT: to be determined    Discontinuation Summary:  Yan Law has been seen in physical therapy from 18 to 18 for 2 treatment sessions. Treatment has been discontinued at this time due to pt being discharged to home health therapy. Goals were not met due to pt being seen only two times before discharge. Thank you for this referral.     INITIAL ASSESSMENT:  Ms. Anny Hylton was referred to OT for the evaluation and treatment of bilateral LE lymphedema that appears to be genetic in nature. She states swelling has been present in her legs since she as a teenager/young adult. Symptoms worsened with time and she had therapy for lymphedema over 10 years ago. She wore compression garments for a few years but then stopped wearing them because they were too difficult to don and legs were swelling even while she wore them. Ms. Anny Hylton states her swelling was relatively stable for awhile but symptoms have worsened again over the last few years. Her son and granddaughter also have LE lymphedema.  Swelling in her LE's have caused skin changes and legs are very heavy making them difficult to lift for walking and transfers. Pt would benefit from skilled OT to decrease bilateral LE lymphedema before transitioning to long term compression garments and home program.     PLAN OF CARE:   PROBLEM LIST:  1. Decreased Activity Tolerance  2. Edema/Girth  3. Decreased Skin Integrity/Hygeine INTERVENTIONS PLANNED  1. Skin care  2. Compression bandaging  3. Fitting for compression garment(s)  4. Manual therapy/Manual lymph drainage  5. Therapeutic exercise/Therapeutic activities  6. Patient Education  7. Compression pump trial prn  8.  kinesiotaping    TREATMENT PLAN:  Effective Dates: 5-22-18 TO 8-19-18. Frequency/Duration: Discharge from OT  Goals were not met as pt was only seen twice before discharge to home health therapy  GOALS: (Goals have been discussed and agreed upon with patient.)  Short-Term Functional Goals: Time Frame: 45 days  1. The patient/caregiver will demonstrate understanding of lymphedema precautions. 2. Patient will be independent with skin care regimen to decrease risk of cellulitis. 3. The patient/caregiver will be independent with self-manual lymph drainage techniques and show decrease in limb volume. 4. Patient will be independent in lymphatic exercises. Discharge Goals: Time Frame: 90 days  1. Patient's bilateral lower extremity circumferential measurements will decrease on volumetric graph by 10-15cm to maximize functional use in ADL's.    2. The patient/caregiver will be independent with home management of lymphedema. 3. Patient/caregiver will be independent donning and doffing bilateral lower extremity compression garment and compliant with daily wear. .      Regarding Delroy Simons's therapy, I certify that the treatment plan above will be carried out by a therapist or under their direction.   Thank you for this referral,  Vern Riggs, OT

## 2018-07-23 ENCOUNTER — APPOINTMENT (OUTPATIENT)
Dept: ULTRASOUND IMAGING | Age: 83
End: 2018-07-23
Attending: EMERGENCY MEDICINE
Payer: MEDICARE

## 2018-07-23 ENCOUNTER — HOSPITAL ENCOUNTER (EMERGENCY)
Age: 83
Discharge: HOME | End: 2018-07-23
Attending: EMERGENCY MEDICINE
Payer: MEDICARE

## 2018-07-23 VITALS
TEMPERATURE: 99.2 F | OXYGEN SATURATION: 95 % | HEART RATE: 89 BPM | DIASTOLIC BLOOD PRESSURE: 85 MMHG | HEIGHT: 67 IN | BODY MASS INDEX: 31.39 KG/M2 | RESPIRATION RATE: 32 BRPM | WEIGHT: 200 LBS | SYSTOLIC BLOOD PRESSURE: 166 MMHG

## 2018-07-23 DIAGNOSIS — M25.511 ACUTE PAIN OF RIGHT SHOULDER: Primary | ICD-10-CM

## 2018-07-23 DIAGNOSIS — I82.890 JUGULAR VEIN THROMBOSIS, RIGHT: ICD-10-CM

## 2018-07-23 LAB
ANION GAP SERPL CALC-SCNC: 11 MMOL/L (ref 7–16)
ATRIAL RATE: 113 BPM
BUN SERPL-MCNC: 43 MG/DL (ref 8–23)
CALCIUM SERPL-MCNC: 10 MG/DL (ref 8.3–10.4)
CALCULATED R AXIS, ECG10: 40 DEGREES
CALCULATED T AXIS, ECG11: -21 DEGREES
CHLORIDE SERPL-SCNC: 95 MMOL/L (ref 98–107)
CO2 SERPL-SCNC: 28 MMOL/L (ref 21–32)
CREAT SERPL-MCNC: 1.98 MG/DL (ref 0.6–1)
DIAGNOSIS, 93000: NORMAL
ERYTHROCYTE [DISTWIDTH] IN BLOOD BY AUTOMATED COUNT: 14.6 % (ref 11.9–14.6)
GLUCOSE SERPL-MCNC: 75 MG/DL (ref 65–100)
HCT VFR BLD AUTO: 38.7 % (ref 35.8–46.3)
HGB BLD-MCNC: 12.7 G/DL (ref 11.7–15.4)
MCH RBC QN AUTO: 27.6 PG (ref 26.1–32.9)
MCHC RBC AUTO-ENTMCNC: 32.8 G/DL (ref 31.4–35)
MCV RBC AUTO: 84.1 FL (ref 79.6–97.8)
PLATELET # BLD AUTO: 189 K/UL (ref 150–450)
PMV BLD AUTO: NORMAL FL (ref 10.8–14.1)
POTASSIUM SERPL-SCNC: 4.6 MMOL/L (ref 3.5–5.1)
Q-T INTERVAL, ECG07: 382 MS
QRS DURATION, ECG06: 154 MS
QTC CALCULATION (BEZET), ECG08: 482 MS
RBC # BLD AUTO: 4.6 M/UL (ref 4.05–5.25)
SODIUM SERPL-SCNC: 134 MMOL/L (ref 136–145)
TROPONIN I SERPL-MCNC: <0.02 NG/ML (ref 0.02–0.05)
VENTRICULAR RATE, ECG03: 96 BPM
WBC # BLD AUTO: 8.7 K/UL (ref 4.3–11.1)

## 2018-07-23 PROCEDURE — 99284 EMERGENCY DEPT VISIT MOD MDM: CPT | Performed by: EMERGENCY MEDICINE

## 2018-07-23 PROCEDURE — 80048 BASIC METABOLIC PNL TOTAL CA: CPT | Performed by: EMERGENCY MEDICINE

## 2018-07-23 PROCEDURE — 96375 TX/PRO/DX INJ NEW DRUG ADDON: CPT | Performed by: EMERGENCY MEDICINE

## 2018-07-23 PROCEDURE — 93005 ELECTROCARDIOGRAM TRACING: CPT | Performed by: EMERGENCY MEDICINE

## 2018-07-23 PROCEDURE — 74011250636 HC RX REV CODE- 250/636: Performed by: EMERGENCY MEDICINE

## 2018-07-23 PROCEDURE — 93971 EXTREMITY STUDY: CPT

## 2018-07-23 PROCEDURE — 85027 COMPLETE CBC AUTOMATED: CPT | Performed by: EMERGENCY MEDICINE

## 2018-07-23 PROCEDURE — 84484 ASSAY OF TROPONIN QUANT: CPT | Performed by: EMERGENCY MEDICINE

## 2018-07-23 PROCEDURE — 96374 THER/PROPH/DIAG INJ IV PUSH: CPT | Performed by: EMERGENCY MEDICINE

## 2018-07-23 RX ORDER — KETOROLAC TROMETHAMINE 30 MG/ML
15 INJECTION, SOLUTION INTRAMUSCULAR; INTRAVENOUS
Status: COMPLETED | OUTPATIENT
Start: 2018-07-23 | End: 2018-07-23

## 2018-07-23 RX ORDER — SODIUM CHLORIDE 0.9 % (FLUSH) 0.9 %
5-10 SYRINGE (ML) INJECTION EVERY 8 HOURS
Status: DISCONTINUED | OUTPATIENT
Start: 2018-07-23 | End: 2018-07-23 | Stop reason: HOSPADM

## 2018-07-23 RX ORDER — COLCHICINE 0.6 MG/1
0.6 TABLET ORAL DAILY
Qty: 3 TAB | Refills: 0 | Status: SHIPPED | OUTPATIENT
Start: 2018-07-23 | End: 2018-08-02

## 2018-07-23 RX ORDER — ONDANSETRON 2 MG/ML
4 INJECTION INTRAMUSCULAR; INTRAVENOUS
Status: COMPLETED | OUTPATIENT
Start: 2018-07-23 | End: 2018-07-23

## 2018-07-23 RX ORDER — MORPHINE SULFATE 10 MG/ML
5 INJECTION, SOLUTION INTRAMUSCULAR; INTRAVENOUS
Status: COMPLETED | OUTPATIENT
Start: 2018-07-23 | End: 2018-07-23

## 2018-07-23 RX ORDER — HYDROCODONE BITARTRATE AND ACETAMINOPHEN 5; 325 MG/1; MG/1
1 TABLET ORAL
Qty: 11 TAB | Refills: 0 | Status: SHIPPED | OUTPATIENT
Start: 2018-07-23 | End: 2018-08-20

## 2018-07-23 RX ORDER — SODIUM CHLORIDE 0.9 % (FLUSH) 0.9 %
5-10 SYRINGE (ML) INJECTION AS NEEDED
Status: DISCONTINUED | OUTPATIENT
Start: 2018-07-23 | End: 2018-07-23 | Stop reason: HOSPADM

## 2018-07-23 RX ADMIN — ONDANSETRON 4 MG: 2 INJECTION, SOLUTION INTRAMUSCULAR; INTRAVENOUS at 06:48

## 2018-07-23 RX ADMIN — MORPHINE SULFATE 5 MG: 10 INJECTION INTRAMUSCULAR; INTRAVENOUS; SUBCUTANEOUS at 06:49

## 2018-07-23 RX ADMIN — KETOROLAC TROMETHAMINE 15 MG: 30 INJECTION, SOLUTION INTRAMUSCULAR at 06:48

## 2018-07-23 NOTE — ED TRIAGE NOTES
Patient states right arm pain. Patient states she was seen at [facility] for same yesterday, states, Bonny Media told me I have arthritis in my arm. \" Patient states she is supposed to get ultrasound of arm today but pain in the arm is too great.

## 2018-07-23 NOTE — ED NOTES
Patient is resting in bed at this time. Family at bedside. Continues with pain to right arm. Pain medication given per MD. Call light within reach.

## 2018-07-23 NOTE — ED PROVIDER NOTES
Patient is a 80 y.o. female presenting with arm pain. The history is provided by the patient. Arm Pain    This is a recurrent problem. The current episode started 2 days ago. The problem occurs constantly. The problem has not changed since onset. Pain location: right shoulder and entire right arm. Quality: unable to describe. The pain is severe. Pertinent negatives include no numbness, no tingling, no back pain and no neck pain. The symptoms are aggravated by movement, contact and palpation. Treatments tried: Ruth prescribed at [facility] yesterday. The treatment provided no relief. There has been no history of extremity trauma. Family history is significant for gout.         Past Medical History:   Diagnosis Date    Acute gout involving toe of left foot 5/19/2017    CAD (coronary artery disease) 2007    \"irregular heart beat\"    Cardiomyopathy [facility]) 11/25/2009    Chronic indwelling Harper catheter     Chronic kidney disease 2002    on HD since Nov 2012    Chronic systolic heart failure ([address] 2007    Colon polyps 2016    CVA (cerebral vascular accident) ([address]     CVA (cerebral vascular accident) ([address] 8/8/2016    Deep vein thrombosis (DVT) of lower extremity ([address] 5/27/2016    Diabetes ([address] 2002    Diabetes mellitus ([address] 6/20/2012    Duodenal ulcer     Duodenal ulcer 11/18/2012    Duodenal ulcer 11/18/2012    Dyslipidemia 8/8/2016    Essential hypertension 10/26/2009    Heart failure ([address]     Hypertension 2009    Hypoglycemia, unspecified 12/3/2012    JOÃO (iron deficiency anemia)     Obesity, morbid 11/25/2009    Other ill-defined conditions(799.89) 2002    Lymphedema    Persistent atrial fibrillation ([address] 10/26/2009    Pulmonary HTN ([address]     SBO (small bowel obstruction) ([address] 8/8/2013    Sleep apnea 2010    non-compliant with CPAP    Sleep apnea - noncompliant with CPAP 11/13/2012    Type II diabetes mellitus, uncontrolled ([address] 6/20/2012    Urinary tract infection 12/3/2012    UTI (lower urinary tract infection) 8/8/2013    UTI (urinary tract infection) 11/23/2012       Past Surgical History:   Procedure Laterality Date    COLONOSCOPY N/A 6/20/2016    COLONOSCOPY performed by Heidi Ku [facility] at [facility] ENDOSCOPY    HX COLONOSCOPY  2016    polyp    HX HYSTERECTOMY  >20 years ago    HX TUBAL LIGATION  >40 years ago         Family History:   Problem Relation Age of Onset    Heart Disease Sister     Diabetes Other        Social History     Social History    Marital status:      Spouse name: N/A    Number of children: N/A    Years of education: N/A     Occupational History    Not on file. Social History Main Topics    Smoking status: Never Smoker    Smokeless tobacco: Never Used    Alcohol use No    Drug use: No    Sexual activity: Not on file     Other Topics Concern    Not on file     Social History Narrative    , lives at home with spouse. Retired day care worker         ALLERGIES: Benazepril; Dynacirc [isradipine]; Peanut; and Morphine    Review of Systems   Constitutional: Negative for fever. Respiratory: Negative for shortness of breath. Cardiovascular: Negative for chest pain. Gastrointestinal: Negative for vomiting. Musculoskeletal: Negative for back pain and neck pain. Neurological: Negative for tingling, weakness and numbness. Vitals:    07/23/18 0526   BP: 128/79   Pulse: (!) 105   Resp: 16   Temp: 99.2 °F (37.3 °C)   SpO2: 97%   Weight: 90.7 kg (200 lb)   Height: 5' 7\" (1.702 m)            Physical Exam   Constitutional: She appears well-nourished. She appears distressed. HENT:   Mouth/Throat: Oropharynx is clear and moist.   Eyes: Conjunctivae are normal.   Cardiovascular: Normal rate, regular rhythm and normal heart sounds. Pulses:       Carotid pulses are 2+ on the right side, and 2+ on the left side. Radial pulses are 1+ on the right side, and 1+ on the left side.    Pulmonary/Chest: Effort normal and breath sounds normal.   Abdominal: Soft. She exhibits no distension. There is no tenderness. There is no rebound and no guarding. Musculoskeletal: She exhibits tenderness (tenderness inwrist and hand area. No pain noted with flexion at the elbow. Significant pain with internal and external rotation of the shoulder flexion of the shoulder. Mild warmth of the shoulder but no erythema noted. Mild swelling of the right wrist). Motor and sensation intact. Faint radial pulse possible. I will Doppler to confirm. Nursing note and vitals reviewed. MDM  Number of Diagnoses or Management Options  Diagnosis management comments: I'm able to Doppler radial pulse and then palpate a 1+ pulse. Patient had osteoarthritis of the right shoulder on x-ray taken yesterday at [facility].  This will not be repeated as there was no trauma. Likely has gout in the shoulder and possibly in the wrist.  She was scheduled for an outpatient ultrasound to exclude DVT and has a history of DVT in the past.  This will be ordered in this emergency department. I do not believe she needs a arterial study given the lack of a cold ischemic hand and dopplerable and palpable pulse present. EKG shows A. Fib that is rate controlled with a right bundle branch block and this is unchanged. Patient is not anticoagulated. Patient was ruled out for MI last night at [facility] and we will recheck a troponin.        Amount and/or Complexity of Data Reviewed  Clinical lab tests: ordered and reviewed  Tests in the radiology section of CPT®: reviewed (Osteoarthritis.  No acute fracture seen    : [place]  TRANSCRIBE TIME/DATE: 07/22/2018 10:21 am  READ BY: Jesenia Moreno MD    THIS IS AN ELECTRONICALLY VERIFIED REPORT   7/22/2018 10:21 AM:  JOHN Elvan Osgood, MD   )    ===================================================================  I have received Shaunna Winfall from Dr. Ofe Andrade    We discussed the patient's complaint, presentation, vitals and differential diagnosis. We discussed the patient's current status, and response to treatments  We reviewed critical lab values, allergies, and other factors. Issues or problems that are still being evaluated are: LUE US pending    We rounded at the patient's bedside, the patient and family's questions and concerns were addressed.     EXAM- left shoulder swollen, tender  No fx on Xray yest at [address] -- right proximal jugular thrombosis, + flow distally    Assessment/Plan- d/w Dr. Refugio Sharpe PMD  Pt with clot and hx of afib-- not on anti-coagulant--   -- will start Nicole Batter  Dr. Refugio Sharpe to follow up    Audi Paul MD; 7/23/2018 @9:09 AM  ===================================================================    ED Course       Procedures

## 2018-07-23 NOTE — DISCHARGE INSTRUCTIONS
Musculoskeletal Pain: Care Instructions  Your Care Instructions    Different problems with the bones, muscles, nerves, ligaments, and tendons in the body can cause pain. One or more areas of your body may ache or burn. Or they may feel tired, stiff, or sore. The medical term for this type of pain is musculoskeletal pain. It can have many different causes. Sometimes the pain is caused by an injury such as a strain or sprain. Or you might have pain from using one part of your body in the same way over and over again. This is called overuse. In some cases, the cause of the pain is another health problem such as arthritis or fibromyalgia. The doctor will examine you and ask you questions about your health to help find the cause of your pain. Blood tests or imaging tests like an X-ray may also be helpful. But sometimes doctors can't find a cause of the pain. Treatment depends on your symptoms and the cause of the pain, if known. The doctor has checked you carefully, but problems can develop later. If you notice any problems or new symptoms, get medical treatment right away. Follow-up care is a key part of your treatment and safety. Be sure to make and go to all appointments, and call your doctor if you are having problems. It's also a good idea to know your test results and keep a list of the medicines you take. How can you care for yourself at home? · Rest until you feel better. · Do not do anything that makes the pain worse. Return to exercise gradually if you feel better and your doctor says it's okay. · Be safe with medicines. Read and follow all instructions on the label. ¨ If the doctor gave you a prescription medicine for pain, take it as prescribed. ¨ If you are not taking a prescription pain medicine, ask your doctor if you can take an over-the-counter medicine. · Put ice or a cold pack on the area for 10 to 20 minutes at a time to ease pain.  Put a thin cloth between the ice and your skin.  When should you call for help? Call your doctor now or seek immediate medical care if:    · You have new pain, or your pain gets worse.     · You have new symptoms such as a fever, a rash, or chills.    Watch closely for changes in your health, and be sure to contact your doctor if:    · You do not get better as expected. Where can you learn more? Go to http://manny-barbara.info/. Enter Q146 in the search box to learn more about \"Musculoskeletal Pain: Care Instructions. \"  Current as of: October 9, 2017  Content Version: 11.7  © 1842-3619 [employer] Care instructions adapted under license by [employer] (which disclaims liability or warranty for this information). If you have questions about a medical condition or this instruction, always ask your healthcare professional. [address] any warranty or liability for your use of this information. Duplex Upper Ext Venous Right    Result Date: 7/23/2018  Right upper extremity venous ultrasound INDICATION:  Right arm pain, history of DVT There are no comparison examinations of the venous structures of the right upper extremity available at the time of image review. FINDINGS: The imaged portions of the proximal left innominate vein are patent. There is mild turbulence of the waveform, a nonspecific finding. The imaged portions of the proximal left subclavian vein are patent. The distal portions of the right internal jugular vein are patent. The proximal portions of the right internal jugular vein are echogenic and noncompressible, consistent with proximal right internal jugular vein thrombus. The right innominate vein, right subclavian vein, right axillary vein, right brachial vein, right basilic vein, right cephalic vein, right radial veins, and right ulnar veins are patent.      IMPRESSION: The proximal right internal jugular vein is echogenic and noncompressible, consistent with proximal right internal jugular vein thrombus. The right internal jugular vein is patent more distally in the neck. No evidence of other venous vascular thrombosis within right upper extremity venous vasculature. DC4 The significant findings in this report have been referred to the Imaging Navigator in order to communicate to the referring provider or his/her designee as outlined in Section II.C.2.a.ii-iii of the ACR Practice Guideline for Communication of Diagnostic Imaging Findings. Recent Results (from the past 8 hour(s))   EKG, 12 LEAD, INITIAL    Collection Time: 07/23/18  6:17 AM   Result Value Ref Range    Ventricular Rate 96 BPM    Atrial Rate 113 BPM    QRS Duration 154 ms    Q-T Interval 382 ms    QTC Calculation (Bezet) 482 ms    Calculated R Axis 40 degrees    Calculated T Axis -21 degrees    Diagnosis       !! AGE AND GENDER SPECIFIC ECG ANALYSIS !!   Atrial fibrillation  Right bundle branch block  Abnormal ECG  When compared with ECG of 23-JUL-2018 06:15,  Atrial fibrillation has replaced Sinus rhythm  T wave inversion less evident in Inferior leads  Confirmed by JUAN ROWAN (), MARICRUZ GIRALDO ([id number]) on 7/23/2018 8:13:57 AM     CBC W/O DIFF    Collection Time: 07/23/18  6:18 AM   Result Value Ref Range    WBC 8.7 4.3 - 11.1 K/uL    RBC 4.60 4.05 - 5.25 M/uL    HGB 12.7 11.7 - 15.4 g/dL    HCT 38.7 35.8 - 46.3 %    MCV 84.1 79.6 - 97.8 FL    MCH 27.6 26.1 - 32.9 PG    MCHC 32.8 31.4 - 35.0 g/dL    RDW 14.6 11.9 - 14.6 %    PLATELET 467 737 - 481 K/uL    MPV Cannot be calculated 10.8 - 14.1 FL   TROPONIN I    Collection Time: 07/23/18  6:43 AM   Result Value Ref Range    Troponin-I, Qt. <0.02 (L) 0.02 - 8.64 NG/ML   METABOLIC PANEL, BASIC    Collection Time: 07/23/18  6:43 AM   Result Value Ref Range    Sodium 134 (L) 136 - 145 mmol/L    Potassium 4.6 3.5 - 5.1 mmol/L    Chloride 95 (L) 98 - 107 mmol/L    CO2 28 21 - 32 mmol/L    Anion gap 11 7 - 16 mmol/L    Glucose 75 65 - 100 mg/dL    BUN 43 (H) 8 - 23 MG/DL Creatinine 1.98 (H) 0.6 - 1.0 MG/DL    GFR est AA 31 (L) >60 ml/min/1.73m2    GFR est non-AA 26 (L) >60 ml/min/1.73m2    Calcium 10.0 8.3 - 10.4 MG/DL

## 2018-07-23 NOTE — ED NOTES
I have reviewed discharge instructions with the patient. The patient verbalized understanding. Patient left ED via Discharge Method: wheelchair to Home with family. Opportunity for questions and clarification provided. Patient given 3 scripts. To continue your aftercare when you leave the hospital, you may receive an automated call from our care team to check in on how you are doing. This is a free service and part of our promise to provide the best care and service to meet your aftercare needs.  If you have questions, or wish to unsubscribe from this service please call [phone]. Thank you for Choosing our [facility] Emergency Department.

## 2018-08-16 ENCOUNTER — APPOINTMENT (OUTPATIENT)
Dept: GENERAL RADIOLOGY | Age: 83
End: 2018-08-16
Attending: EMERGENCY MEDICINE
Payer: MEDICARE

## 2018-08-16 ENCOUNTER — HOSPITAL ENCOUNTER (EMERGENCY)
Age: 83
Discharge: HOME | End: 2018-08-16
Attending: EMERGENCY MEDICINE
Payer: MEDICARE

## 2018-08-16 VITALS
HEART RATE: 68 BPM | OXYGEN SATURATION: 97 % | TEMPERATURE: 98.2 F | RESPIRATION RATE: 18 BRPM | DIASTOLIC BLOOD PRESSURE: 75 MMHG | SYSTOLIC BLOOD PRESSURE: 149 MMHG | BODY MASS INDEX: 34.37 KG/M2 | WEIGHT: 219 LBS | HEIGHT: 67 IN

## 2018-08-16 DIAGNOSIS — I89.0 LYMPHEDEMA: ICD-10-CM

## 2018-08-16 DIAGNOSIS — I50.9 ACUTE ON CHRONIC CONGESTIVE HEART FAILURE, UNSPECIFIED HEART FAILURE TYPE (HCC): Primary | ICD-10-CM

## 2018-08-16 DIAGNOSIS — E78.5 DYSLIPIDEMIA: ICD-10-CM

## 2018-08-16 DIAGNOSIS — E11.21 TYPE 2 DIABETES WITH NEPHROPATHY (HCC): ICD-10-CM

## 2018-08-16 LAB
ALBUMIN SERPL-MCNC: 3.4 G/DL (ref 3.2–4.6)
ALBUMIN/GLOB SERPL: 0.6 {RATIO} (ref 1.2–3.5)
ALP SERPL-CCNC: 94 U/L (ref 50–136)
ALT SERPL-CCNC: 10 U/L (ref 12–65)
ANION GAP SERPL CALC-SCNC: 10 MMOL/L (ref 7–16)
AST SERPL-CCNC: 20 U/L (ref 15–37)
BASOPHILS # BLD: 0 K/UL
BASOPHILS NFR BLD: 0 % (ref 0–2)
BILIRUB SERPL-MCNC: 0.8 MG/DL (ref 0.2–1.1)
BNP SERPL-MCNC: 111 PG/ML
BUN SERPL-MCNC: 61 MG/DL (ref 8–23)
CALCIUM SERPL-MCNC: 10 MG/DL (ref 8.3–10.4)
CHLORIDE SERPL-SCNC: 96 MMOL/L (ref 98–107)
CO2 SERPL-SCNC: 30 MMOL/L (ref 21–32)
CREAT SERPL-MCNC: 2.05 MG/DL (ref 0.6–1)
DIFFERENTIAL METHOD BLD: ABNORMAL
EOSINOPHIL # BLD: 0.1 K/UL
EOSINOPHIL NFR BLD: 1 % (ref 0.5–7.8)
ERYTHROCYTE [DISTWIDTH] IN BLOOD BY AUTOMATED COUNT: 14.4 %
GLOBULIN SER CALC-MCNC: 5.3 G/DL (ref 2.3–3.5)
GLUCOSE SERPL-MCNC: 82 MG/DL (ref 65–100)
HCT VFR BLD AUTO: 33.2 % (ref 35.8–46.3)
HGB BLD-MCNC: 10.3 G/DL (ref 11.7–15.4)
IMM GRANULOCYTES # BLD: 0 K/UL
IMM GRANULOCYTES NFR BLD AUTO: 0 % (ref 0–5)
LYMPHOCYTES # BLD: 0.7 K/UL
LYMPHOCYTES NFR BLD: 9 % (ref 13–44)
MCH RBC QN AUTO: 27 PG (ref 26.1–32.9)
MCHC RBC AUTO-ENTMCNC: 31 G/DL (ref 31.4–35)
MCV RBC AUTO: 87.1 FL (ref 79.6–97.8)
MONOCYTES # BLD: 0.7 K/UL
MONOCYTES NFR BLD: 10 % (ref 4–12)
NEUTS SEG # BLD: 5.5 K/UL
NEUTS SEG NFR BLD: 79 % (ref 43–78)
NRBC # BLD: 0 K/UL (ref 0–0.2)
PLATELET # BLD AUTO: 179 K/UL (ref 150–450)
PMV BLD AUTO: 12.9 FL (ref 9.4–12.3)
POTASSIUM SERPL-SCNC: 4.7 MMOL/L (ref 3.5–5.1)
PROT SERPL-MCNC: 8.7 G/DL (ref 6.3–8.2)
RBC # BLD AUTO: 3.81 M/UL (ref 4.05–5.2)
SODIUM SERPL-SCNC: 136 MMOL/L (ref 136–145)
WBC # BLD AUTO: 6.9 K/UL (ref 4.3–11.1)

## 2018-08-16 PROCEDURE — 80053 COMPREHEN METABOLIC PANEL: CPT

## 2018-08-16 PROCEDURE — 74011250636 HC RX REV CODE- 250/636: Performed by: EMERGENCY MEDICINE

## 2018-08-16 PROCEDURE — 83880 ASSAY OF NATRIURETIC PEPTIDE: CPT

## 2018-08-16 PROCEDURE — 96374 THER/PROPH/DIAG INJ IV PUSH: CPT | Performed by: EMERGENCY MEDICINE

## 2018-08-16 PROCEDURE — 71045 X-RAY EXAM CHEST 1 VIEW: CPT

## 2018-08-16 PROCEDURE — 85025 COMPLETE CBC W/AUTO DIFF WBC: CPT

## 2018-08-16 PROCEDURE — 99284 EMERGENCY DEPT VISIT MOD MDM: CPT | Performed by: EMERGENCY MEDICINE

## 2018-08-16 RX ORDER — FUROSEMIDE 10 MG/ML
60 INJECTION INTRAMUSCULAR; INTRAVENOUS
Status: COMPLETED | OUTPATIENT
Start: 2018-08-16 | End: 2018-08-16

## 2018-08-16 RX ORDER — TRAMADOL HYDROCHLORIDE 50 MG/1
50 TABLET ORAL
Qty: 20 TAB | Refills: 0 | Status: SHIPPED | OUTPATIENT
Start: 2018-08-16 | End: 2018-08-20

## 2018-08-16 RX ADMIN — FUROSEMIDE 60 MG: 10 INJECTION, SOLUTION INTRAMUSCULAR; INTRAVENOUS at 10:38

## 2018-08-16 NOTE — ED NOTES
I have reviewed discharge instructions with the patient and spouse. The patient and spouse verbalized understanding. Patient left ED via Discharge Method: wheelchair to Home with  and daughter). Opportunity for questions and clarification provided. Patient given 1 scripts. For ultram.  No e-sign        To continue your aftercare when you leave the hospital, you may receive an automated call from our care team to check in on how you are doing. This is a free service and part of our promise to provide the best care and service to meet your aftercare needs.  If you have questions, or wish to unsubscribe from this service please call [phone]. Thank you for Choosing our [facility] Emergency Department.

## 2018-08-16 NOTE — DISCHARGE INSTRUCTIONS
Heart Failure: Care Instructions  Your Care Instructions    Heart failure occurs when your heart does not pump as much blood as the body needs. Failure does not mean that the heart has stopped pumping but rather that it is not pumping as well as it should. Over time, this causes fluid buildup in your lungs and other parts of your body. Fluid buildup can cause shortness of breath, fatigue, swollen ankles, and other problems. By taking medicines regularly, reducing sodium (salt) in your diet, checking your weight every day, and making lifestyle changes, you can feel better and live longer. Follow-up care is a key part of your treatment and safety. Be sure to make and go to all appointments, and call your doctor if you are having problems. It's also a good idea to know your test results and keep a list of the medicines you take. How can you care for yourself at home? Medicines    · Be safe with medicines. Take your medicines exactly as prescribed. Call your doctor if you think you are having a problem with your medicine.     · Do not take any vitamins, over-the-counter medicine, or herbal products without talking to your doctor first. Katia Melendezanjel not take ibuprofen (Advil or Motrin) and naproxen (Aleve) without talking to your doctor first. They could make your heart failure worse.     · You may be taking some of the following medicine. ¨ Beta-blockers can slow heart rate, decrease blood pressure, and improve your condition. Taking a beta-blocker may lower your chance of needing to be hospitalized. ¨ Angiotensin-converting enzyme inhibitors (ACEIs) reduce the heart's workload, lower blood pressure, and reduce swelling. Taking an ACEI may lower your chance of needing to be hospitalized again. ¨ Angiotensin II receptor blockers (ARBs) work like ACEIs. Your doctor may prescribe them instead of ACEIs. ¨ Diuretics, also called water pills, reduce swelling.   ¨ Potassium supplements replace this important mineral, which is sometimes lost with diuretics. ¨ Aspirin and other blood thinners prevent blood clots, which can cause a stroke or heart attack.    You will get more details on the specific medicines your doctor prescribes. Diet    · Your doctor may suggest that you limit sodium to 2,000 milligrams (mg) a day or less. That is less than 1 teaspoon of salt a day, including all the salt you eat in cooking or in packaged foods. People get most of their sodium from processed foods. Fast food and restaurant meals also tend to be very high in sodium.     · Ask your doctor how much liquid you can drink each day. You may have to limit liquids.    Weight    · Weigh yourself without clothing at the same time each day. Record your weight. Call your doctor if you have a sudden weight gain, such as more than 2 to 3 pounds in a day or 5 pounds in a week. (Your doctor may suggest a different range of weight gain.) A sudden weight gain may mean that your heart failure is getting worse.    Activity level    · Start light exercise (if your doctor says it is okay). Even if you can only do a small amount, exercise will help you get stronger, have more energy, and manage your weight and your stress. Walking is an easy way to get exercise. Start out by walking a little more than you did before. Bit by bit, increase the amount you walk.     · When you exercise, watch for signs that your heart is working too hard. You are pushing yourself too hard if you cannot talk while you are exercising. If you become short of breath or dizzy or have chest pain, stop, sit down, and rest.     · If you feel \"wiped out\" the day after you exercise, walk slower or for a shorter distance until you can work up to a better pace.     · Get enough rest at night. Sleeping with 1 or 2 pillows under your upper body and head may help you breathe easier.    Lifestyle changes    · Do not smoke. Smoking can make a heart condition worse.  If you need help quitting, talk to your doctor about stop-smoking programs and medicines. These can increase your chances of quitting for good. Quitting smoking may be the most important step you can take to protect your heart.     · Limit alcohol to 2 drinks a day for men and 1 drink a day for women. Too much alcohol can cause health problems.     · Avoid getting sick from colds and the flu. Get a pneumococcal vaccine shot. If you have had one before, ask your doctor whether you need another dose. Get a flu shot each year. If you must be around people with colds or the flu, wash your hands often. When should you call for help? Call 911 if you have symptoms of sudden heart failure such as:    · You have severe trouble breathing.     · You cough up pink, foamy mucus.     · You have a new irregular or rapid heartbeat.    Call your doctor now or seek immediate medical care if:    · You have new or increased shortness of breath.     · You are dizzy or lightheaded, or you feel like you may faint.     · You have sudden weight gain, such as more than 2 to 3 pounds in a day or 5 pounds in a week. (Your doctor may suggest a different range of weight gain.)     · You have increased swelling in your legs, ankles, or feet.     · You are suddenly so tired or weak that you cannot do your usual activities.    Watch closely for changes in your health, and be sure to contact your doctor if you develop new symptoms. Where can you learn more? Go to http://manny-barbara.info/. Enter Q926 in the search box to learn more about \"Heart Failure: Care Instructions. \"  Current as of: May 10, 2017  Content Version: 11.7  © 1108-3377 [employer]. Care instructions adapted under license by [employer] (which disclaims liability or warranty for this information).  If you have questions about a medical condition or this instruction, always ask your healthcare professional. [address] any warranty or liability for your use of this information.

## 2018-08-16 NOTE — ED PROVIDER NOTES
HPI Comments: Patient states she has been having shortness of breath and lower extremity pain for the past week. She has chronic lymphedema, states in his essentially at its baseline. She describes orthopnea, states that she has trouble breathing at night when she lies flat. She has to sit up for a while and then she is able to go back to sleep. She has had similar symptoms in the past, states that she has congestive heart failure. She takes medication for this and states she has been taking her medicine that she does not specifically what it is. She denies any chest pain, denies any fever or cough. Elements of this note were created using speech recognition software. As such, errors of speech recognition may be present. Patient is a 80 y.o. female presenting with lower extremity edema. The history is provided by the patient.    Leg Swelling           Past Medical History:   Diagnosis Date    Acute gout involving toe of left foot 5/19/2017    CAD (coronary artery disease) 2007    \"irregular heart beat\"    Cardiomyopathy [facility]) 11/25/2009    Chronic indwelling Harper catheter     Chronic kidney disease 2002    on HD since Nov 2012    Chronic systolic heart failure [facility]) 2007    Colon polyps 2016    CVA (cerebral vascular accident) ([address]     CVA (cerebral vascular accident) ([address] 8/8/2016    Deep vein thrombosis (DVT) of lower extremity ([address] 5/27/2016    Diabetes ([address] 2002    Diabetes mellitus ([address] 6/20/2012    Duodenal ulcer     Duodenal ulcer 11/18/2012    Duodenal ulcer 11/18/2012    Dyslipidemia 8/8/2016    Essential hypertension 10/26/2009    Heart failure ([address]     Hypertension 2009    Hypoglycemia, unspecified 12/3/2012    JOÃO (iron deficiency anemia)     Obesity, morbid 11/25/2009    Other ill-defined conditions(799.89) 2002    Lymphedema    Persistent atrial fibrillation ([address] 10/26/2009    Pulmonary HTN ([address]     SBO (small bowel obstruction) ([address] 8/8/2013    Sleep apnea 2010    non-compliant with CPAP    Sleep apnea - noncompliant with CPAP 11/13/2012    Type II diabetes mellitus, uncontrolled [facility] 6/20/2012    Urinary tract infection 12/3/2012    UTI (lower urinary tract infection) 8/8/2013    UTI (urinary tract infection) 11/23/2012       Past Surgical History:   Procedure Laterality Date    COLONOSCOPY N/A 6/20/2016    [facility]id [facility]    HX COLONOSCOPY  2016    polyp    HX HYSTERECTOMY  >20 years ago    HX TUBAL LIGATION  >40 years ago         Family History:   Problem Relation Age of Onset    Heart Disease Sister     Diabetes Other        Social History     Social History    Marital status:      Spouse name: N/A    Number of children: N/A    Years of education: N/A     Occupational History    Not on file. Social History Main Topics    Smoking status: Never Smoker    Smokeless tobacco: Never Used    Alcohol use No    Drug use: No    Sexual activity: Not on file     Other Topics Concern    Not on file     Social History Narrative    , lives at home with spouse. Retired day care worker         ALLERGIES: Benazepril; Dynacirc [isradipine]; Peanut; and Morphine    Review of Systems   Constitutional: Negative for chills and fever. Respiratory: Positive for shortness of breath. Gastrointestinal: Negative for nausea and vomiting. All other systems reviewed and are negative. Vitals:    08/16/18 0951   BP: 120/67   Pulse: 87   Resp: 18   Temp: 98.2 °F (36.8 °C)   SpO2: 96%   Weight: 99.3 kg (219 lb)   Height: 5' 7\" (1.702 m)            Physical Exam   Constitutional: She is oriented to person, place, and time. She appears well-developed and well-nourished. HENT:   Head: Normocephalic and atraumatic. Eyes: Conjunctivae are normal. Pupils are equal, round, and reactive to light. Neck: Normal range of motion. Neck supple. Pulmonary/Chest: She has rales.    Mild bibasilar rales Musculoskeletal: She exhibits edema. She exhibits no tenderness. Pitting edema bilateral lower extremities below the knees with lymphedema. No cellulitis or erythema noted   Neurological: She is alert and oriented to person, place, and time. Skin: Skin is warm and dry. Psychiatric: She has a normal mood and affect. Her behavior is normal.   Nursing note and vitals reviewed. 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 She recommends increasing the dose of Demadex by 20 mg daily for the next 4 days.   Discussed this with family, the they state that she has an appointment with her primary doctor on Monday and her cardiologist later in the week       Amount and/or Complexity of Data Reviewed  Clinical lab tests: ordered and reviewed  Tests in the radiology section of CPT®: ordered and reviewed  Tests in the medicine section of CPT®: ordered and reviewed  Decide to obtain previous medical records or to obtain history from someone other than the patient: yes  Review and summarize past medical records: yes  Discuss the patient with other providers: yes  Independent visualization of images, tracings, or specimens: yes    Risk of Complications, Morbidity, and/or Mortality  Presenting problems: moderate  Diagnostic procedures: moderate  Management options: moderate    Patient Progress  Patient progress: improved        ED Course       Procedures

## 2018-08-20 ENCOUNTER — HOME HEALTH ADMISSION (OUTPATIENT)
Dept: HOME HEALTH SERVICES | Facility: HOME HEALTH | Age: 83
End: 2018-08-20
Payer: MEDICARE

## 2018-08-20 PROBLEM — N18.4 ACUTE RENAL FAILURE SUPERIMPOSED ON STAGE 4 CHRONIC KIDNEY DISEASE (HCC): Status: ACTIVE | Noted: 2018-08-20

## 2018-08-20 PROBLEM — M79.601 PAIN OF RIGHT UPPER EXTREMITY: Status: ACTIVE | Noted: 2018-08-20

## 2018-08-20 PROBLEM — N17.9 ACUTE RENAL FAILURE SUPERIMPOSED ON STAGE 4 CHRONIC KIDNEY DISEASE (HCC): Status: ACTIVE | Noted: 2018-08-20

## 2018-08-21 ENCOUNTER — HOME CARE VISIT (OUTPATIENT)
Dept: SCHEDULING | Facility: HOME HEALTH | Age: 83
End: 2018-08-21
Payer: MEDICARE

## 2018-08-21 VITALS
RESPIRATION RATE: 16 BRPM | DIASTOLIC BLOOD PRESSURE: 82 MMHG | TEMPERATURE: 96 F | HEART RATE: 71 BPM | SYSTOLIC BLOOD PRESSURE: 120 MMHG

## 2018-08-21 PROBLEM — R53.81 DEBILITY: Status: ACTIVE | Noted: 2018-08-21

## 2018-08-21 PROCEDURE — 400013 HH SOC

## 2018-08-21 PROCEDURE — 3331090001 HH PPS REVENUE CREDIT

## 2018-08-21 PROCEDURE — G0151 HHCP-SERV OF PT,EA 15 MIN: HCPCS

## 2018-08-21 PROCEDURE — 3331090002 HH PPS REVENUE DEBIT

## 2018-08-22 ENCOUNTER — HOME CARE VISIT (OUTPATIENT)
Dept: SCHEDULING | Facility: HOME HEALTH | Age: 83
End: 2018-08-22
Payer: MEDICARE

## 2018-08-22 VITALS
SYSTOLIC BLOOD PRESSURE: 122 MMHG | RESPIRATION RATE: 17 BRPM | DIASTOLIC BLOOD PRESSURE: 84 MMHG | TEMPERATURE: 97.2 F | HEART RATE: 75 BPM

## 2018-08-22 PROCEDURE — G0156 HHCP-SVS OF AIDE,EA 15 MIN: HCPCS

## 2018-08-22 PROCEDURE — 3331090002 HH PPS REVENUE DEBIT

## 2018-08-22 PROCEDURE — 3331090001 HH PPS REVENUE CREDIT

## 2018-08-23 ENCOUNTER — HOME CARE VISIT (OUTPATIENT)
Dept: SCHEDULING | Facility: HOME HEALTH | Age: 83
End: 2018-08-23
Payer: MEDICARE

## 2018-08-23 VITALS
DIASTOLIC BLOOD PRESSURE: 82 MMHG | RESPIRATION RATE: 19 BRPM | HEART RATE: 76 BPM | SYSTOLIC BLOOD PRESSURE: 120 MMHG | TEMPERATURE: 98.1 F

## 2018-08-23 PROCEDURE — 3331090001 HH PPS REVENUE CREDIT

## 2018-08-23 PROCEDURE — 3331090002 HH PPS REVENUE DEBIT

## 2018-08-23 PROCEDURE — G0157 HHC PT ASSISTANT EA 15: HCPCS

## 2018-08-24 ENCOUNTER — HOME CARE VISIT (OUTPATIENT)
Dept: SCHEDULING | Facility: HOME HEALTH | Age: 83
End: 2018-08-24
Payer: MEDICARE

## 2018-08-24 VITALS
OXYGEN SATURATION: 92 % | HEART RATE: 91 BPM | DIASTOLIC BLOOD PRESSURE: 60 MMHG | TEMPERATURE: 98.3 F | SYSTOLIC BLOOD PRESSURE: 134 MMHG

## 2018-08-24 VITALS
RESPIRATION RATE: 17 BRPM | SYSTOLIC BLOOD PRESSURE: 134 MMHG | TEMPERATURE: 97.5 F | DIASTOLIC BLOOD PRESSURE: 84 MMHG | HEART RATE: 77 BPM

## 2018-08-24 PROCEDURE — 3331090002 HH PPS REVENUE DEBIT

## 2018-08-24 PROCEDURE — 3331090001 HH PPS REVENUE CREDIT

## 2018-08-24 PROCEDURE — G0156 HHCP-SVS OF AIDE,EA 15 MIN: HCPCS

## 2018-08-24 PROCEDURE — G0152 HHCP-SERV OF OT,EA 15 MIN: HCPCS

## 2018-08-25 PROCEDURE — 3331090001 HH PPS REVENUE CREDIT

## 2018-08-25 PROCEDURE — 3331090002 HH PPS REVENUE DEBIT

## 2018-08-26 PROCEDURE — 3331090001 HH PPS REVENUE CREDIT

## 2018-08-26 PROCEDURE — 3331090002 HH PPS REVENUE DEBIT

## 2018-08-27 ENCOUNTER — HOME CARE VISIT (OUTPATIENT)
Dept: SCHEDULING | Facility: HOME HEALTH | Age: 83
End: 2018-08-27
Payer: MEDICARE

## 2018-08-27 VITALS
SYSTOLIC BLOOD PRESSURE: 130 MMHG | DIASTOLIC BLOOD PRESSURE: 72 MMHG | TEMPERATURE: 97.6 F | OXYGEN SATURATION: 97 % | HEART RATE: 65 BPM | RESPIRATION RATE: 18 BRPM

## 2018-08-27 PROCEDURE — 3331090002 HH PPS REVENUE DEBIT

## 2018-08-27 PROCEDURE — 3331090001 HH PPS REVENUE CREDIT

## 2018-08-27 PROCEDURE — G0158 HHC OT ASSISTANT EA 15: HCPCS

## 2018-08-27 PROCEDURE — G0156 HHCP-SVS OF AIDE,EA 15 MIN: HCPCS

## 2018-08-28 ENCOUNTER — HOME CARE VISIT (OUTPATIENT)
Dept: SCHEDULING | Facility: HOME HEALTH | Age: 83
End: 2018-08-28
Payer: MEDICARE

## 2018-08-28 VITALS
DIASTOLIC BLOOD PRESSURE: 72 MMHG | HEART RATE: 65 BPM | TEMPERATURE: 97.6 F | OXYGEN SATURATION: 97 % | SYSTOLIC BLOOD PRESSURE: 130 MMHG

## 2018-08-28 PROCEDURE — 3331090001 HH PPS REVENUE CREDIT

## 2018-08-28 PROCEDURE — 3331090002 HH PPS REVENUE DEBIT

## 2018-08-28 PROCEDURE — G0157 HHC PT ASSISTANT EA 15: HCPCS

## 2018-08-29 ENCOUNTER — HOME CARE VISIT (OUTPATIENT)
Dept: HOME HEALTH SERVICES | Facility: HOME HEALTH | Age: 83
End: 2018-08-29
Payer: MEDICARE

## 2018-08-29 VITALS — HEART RATE: 71 BPM | TEMPERATURE: 97.1 F | SYSTOLIC BLOOD PRESSURE: 142 MMHG | DIASTOLIC BLOOD PRESSURE: 80 MMHG

## 2018-08-29 PROCEDURE — 3331090001 HH PPS REVENUE CREDIT

## 2018-08-29 PROCEDURE — 3331090002 HH PPS REVENUE DEBIT

## 2018-08-30 ENCOUNTER — HOME CARE VISIT (OUTPATIENT)
Dept: SCHEDULING | Facility: HOME HEALTH | Age: 83
End: 2018-08-30
Payer: MEDICARE

## 2018-08-30 VITALS
TEMPERATURE: 97.8 F | RESPIRATION RATE: 17 BRPM | SYSTOLIC BLOOD PRESSURE: 138 MMHG | OXYGEN SATURATION: 98 % | HEART RATE: 74 BPM | DIASTOLIC BLOOD PRESSURE: 69 MMHG

## 2018-08-30 VITALS
HEART RATE: 74 BPM | TEMPERATURE: 97.8 F | SYSTOLIC BLOOD PRESSURE: 138 MMHG | DIASTOLIC BLOOD PRESSURE: 69 MMHG | OXYGEN SATURATION: 98 %

## 2018-08-30 PROCEDURE — G0156 HHCP-SVS OF AIDE,EA 15 MIN: HCPCS

## 2018-08-30 PROCEDURE — G0158 HHC OT ASSISTANT EA 15: HCPCS

## 2018-08-30 PROCEDURE — 3331090001 HH PPS REVENUE CREDIT

## 2018-08-30 PROCEDURE — 3331090002 HH PPS REVENUE DEBIT

## 2018-08-31 ENCOUNTER — HOME CARE VISIT (OUTPATIENT)
Dept: HOME HEALTH SERVICES | Facility: HOME HEALTH | Age: 83
End: 2018-08-31
Payer: MEDICARE

## 2018-08-31 VITALS
TEMPERATURE: 96.8 F | RESPIRATION RATE: 18 BRPM | HEART RATE: 81 BPM | SYSTOLIC BLOOD PRESSURE: 148 MMHG | DIASTOLIC BLOOD PRESSURE: 84 MMHG

## 2018-08-31 PROCEDURE — G0157 HHC PT ASSISTANT EA 15: HCPCS

## 2018-08-31 PROCEDURE — 3331090001 HH PPS REVENUE CREDIT

## 2018-08-31 PROCEDURE — 3331090002 HH PPS REVENUE DEBIT

## 2018-09-01 PROCEDURE — 3331090001 HH PPS REVENUE CREDIT

## 2018-09-01 PROCEDURE — 3331090002 HH PPS REVENUE DEBIT

## 2018-09-02 PROCEDURE — 3331090001 HH PPS REVENUE CREDIT

## 2018-09-02 PROCEDURE — 3331090002 HH PPS REVENUE DEBIT

## 2018-09-03 ENCOUNTER — HOME CARE VISIT (OUTPATIENT)
Dept: SCHEDULING | Facility: HOME HEALTH | Age: 83
End: 2018-09-03
Payer: MEDICARE

## 2018-09-03 VITALS
HEART RATE: 77 BPM | OXYGEN SATURATION: 94 % | SYSTOLIC BLOOD PRESSURE: 131 MMHG | DIASTOLIC BLOOD PRESSURE: 67 MMHG | TEMPERATURE: 98.1 F

## 2018-09-03 PROCEDURE — 3331090002 HH PPS REVENUE DEBIT

## 2018-09-03 PROCEDURE — G0158 HHC OT ASSISTANT EA 15: HCPCS

## 2018-09-03 PROCEDURE — 3331090001 HH PPS REVENUE CREDIT

## 2018-09-04 ENCOUNTER — HOME CARE VISIT (OUTPATIENT)
Dept: SCHEDULING | Facility: HOME HEALTH | Age: 83
End: 2018-09-04
Payer: MEDICARE

## 2018-09-04 VITALS
HEART RATE: 76 BPM | TEMPERATURE: 97.7 F | DIASTOLIC BLOOD PRESSURE: 68 MMHG | RESPIRATION RATE: 17 BRPM | SYSTOLIC BLOOD PRESSURE: 120 MMHG

## 2018-09-04 VITALS
TEMPERATURE: 97.9 F | RESPIRATION RATE: 17 BRPM | SYSTOLIC BLOOD PRESSURE: 138 MMHG | HEART RATE: 76 BPM | DIASTOLIC BLOOD PRESSURE: 80 MMHG

## 2018-09-04 PROCEDURE — 3331090001 HH PPS REVENUE CREDIT

## 2018-09-04 PROCEDURE — 3331090002 HH PPS REVENUE DEBIT

## 2018-09-04 PROCEDURE — G0156 HHCP-SVS OF AIDE,EA 15 MIN: HCPCS

## 2018-09-04 PROCEDURE — G0157 HHC PT ASSISTANT EA 15: HCPCS

## 2018-09-05 PROCEDURE — 3331090002 HH PPS REVENUE DEBIT

## 2018-09-05 PROCEDURE — 3331090001 HH PPS REVENUE CREDIT

## 2018-09-06 ENCOUNTER — HOME CARE VISIT (OUTPATIENT)
Dept: SCHEDULING | Facility: HOME HEALTH | Age: 83
End: 2018-09-06
Payer: MEDICARE

## 2018-09-06 VITALS
DIASTOLIC BLOOD PRESSURE: 64 MMHG | RESPIRATION RATE: 17 BRPM | TEMPERATURE: 98.1 F | SYSTOLIC BLOOD PRESSURE: 113 MMHG | OXYGEN SATURATION: 98 % | HEART RATE: 76 BPM

## 2018-09-06 VITALS
HEART RATE: 76 BPM | OXYGEN SATURATION: 98 % | SYSTOLIC BLOOD PRESSURE: 113 MMHG | DIASTOLIC BLOOD PRESSURE: 64 MMHG | TEMPERATURE: 98.1 F

## 2018-09-06 PROCEDURE — 3331090002 HH PPS REVENUE DEBIT

## 2018-09-06 PROCEDURE — G0158 HHC OT ASSISTANT EA 15: HCPCS

## 2018-09-06 PROCEDURE — 3331090001 HH PPS REVENUE CREDIT

## 2018-09-06 PROCEDURE — G0156 HHCP-SVS OF AIDE,EA 15 MIN: HCPCS

## 2018-09-07 ENCOUNTER — APPOINTMENT (OUTPATIENT)
Dept: CT IMAGING | Age: 83
End: 2018-09-07
Attending: EMERGENCY MEDICINE
Payer: MEDICARE

## 2018-09-07 ENCOUNTER — APPOINTMENT (OUTPATIENT)
Dept: GENERAL RADIOLOGY | Age: 83
End: 2018-09-07
Attending: EMERGENCY MEDICINE
Payer: MEDICARE

## 2018-09-07 ENCOUNTER — HOME CARE VISIT (OUTPATIENT)
Dept: HOME HEALTH SERVICES | Facility: HOME HEALTH | Age: 83
End: 2018-09-07
Payer: MEDICARE

## 2018-09-07 ENCOUNTER — HOSPITAL ENCOUNTER (OUTPATIENT)
Age: 83
Setting detail: OBSERVATION
Discharge: NURSING, REHAB OR LONG-TERM CARE | End: 2018-09-14
Attending: EMERGENCY MEDICINE | Admitting: INTERNAL MEDICINE
Payer: MEDICARE

## 2018-09-07 ENCOUNTER — HOME CARE VISIT (OUTPATIENT)
Dept: SCHEDULING | Facility: HOME HEALTH | Age: 83
End: 2018-09-07
Payer: MEDICARE

## 2018-09-07 VITALS
HEART RATE: 80 BPM | SYSTOLIC BLOOD PRESSURE: 102 MMHG | RESPIRATION RATE: 16 BRPM | TEMPERATURE: 96.7 F | DIASTOLIC BLOOD PRESSURE: 60 MMHG

## 2018-09-07 DIAGNOSIS — R53.81 DEBILITY: ICD-10-CM

## 2018-09-07 DIAGNOSIS — N30.00 ACUTE CYSTITIS WITHOUT HEMATURIA: ICD-10-CM

## 2018-09-07 DIAGNOSIS — E87.1 HYPONATREMIA: Primary | ICD-10-CM

## 2018-09-07 LAB
ALBUMIN SERPL-MCNC: 3.2 G/DL (ref 3.2–4.6)
ALBUMIN/GLOB SERPL: 0.6 {RATIO} (ref 1.2–3.5)
ALP SERPL-CCNC: 111 U/L (ref 50–136)
ALT SERPL-CCNC: 8 U/L (ref 12–65)
ANION GAP SERPL CALC-SCNC: 12 MMOL/L (ref 7–16)
ANION GAP SERPL CALC-SCNC: 17 MMOL/L (ref 7–16)
AST SERPL-CCNC: 13 U/L (ref 15–37)
ATRIAL RATE: 234 BPM
BACTERIA URNS QL MICRO: ABNORMAL /HPF
BASOPHILS # BLD: 0 K/UL (ref 0–0.2)
BASOPHILS NFR BLD: 1 % (ref 0–2)
BILIRUB SERPL-MCNC: 0.6 MG/DL (ref 0.2–1.1)
BUN SERPL-MCNC: 161 MG/DL (ref 8–23)
BUN SERPL-MCNC: 165 MG/DL (ref 8–23)
CALCIUM SERPL-MCNC: 10.6 MG/DL (ref 8.3–10.4)
CALCIUM SERPL-MCNC: 11 MG/DL (ref 8.3–10.4)
CALCULATED R AXIS, ECG10: 152 DEGREES
CALCULATED T AXIS, ECG11: 11 DEGREES
CASTS URNS QL MICRO: ABNORMAL /LPF
CHLORIDE SERPL-SCNC: 81 MMOL/L (ref 98–107)
CHLORIDE SERPL-SCNC: 83 MMOL/L (ref 98–107)
CO2 SERPL-SCNC: 26 MMOL/L (ref 21–32)
CO2 SERPL-SCNC: 30 MMOL/L (ref 21–32)
CREAT SERPL-MCNC: 2.25 MG/DL (ref 0.6–1)
CREAT SERPL-MCNC: 2.26 MG/DL (ref 0.6–1)
CREAT UR-MCNC: 47.8 MG/DL
DIAGNOSIS, 93000: NORMAL
DIFFERENTIAL METHOD BLD: ABNORMAL
EOSINOPHIL # BLD: 0.1 K/UL (ref 0–0.8)
EOSINOPHIL NFR BLD: 1 % (ref 0.5–7.8)
EPI CELLS #/AREA URNS HPF: 0 /HPF
ERYTHROCYTE [DISTWIDTH] IN BLOOD BY AUTOMATED COUNT: 14.4 %
GLOBULIN SER CALC-MCNC: 5.7 G/DL (ref 2.3–3.5)
GLUCOSE BLD STRIP.AUTO-MCNC: 262 MG/DL (ref 65–100)
GLUCOSE BLD STRIP.AUTO-MCNC: 282 MG/DL (ref 65–100)
GLUCOSE SERPL-MCNC: 276 MG/DL (ref 65–100)
GLUCOSE SERPL-MCNC: 284 MG/DL (ref 65–100)
HCT VFR BLD AUTO: 36.6 % (ref 35.8–46.3)
HGB BLD-MCNC: 11.7 G/DL (ref 11.7–15.4)
IMM GRANULOCYTES # BLD: 0 K/UL (ref 0–0.5)
IMM GRANULOCYTES NFR BLD AUTO: 0 % (ref 0–5)
LYMPHOCYTES # BLD: 0.6 K/UL (ref 0.5–4.6)
LYMPHOCYTES NFR BLD: 10 % (ref 13–44)
MCH RBC QN AUTO: 26.5 PG (ref 26.1–32.9)
MCHC RBC AUTO-ENTMCNC: 32 G/DL (ref 31.4–35)
MCV RBC AUTO: 82.8 FL (ref 79.6–97.8)
MONOCYTES # BLD: 0.4 K/UL (ref 0.1–1.3)
MONOCYTES NFR BLD: 7 % (ref 4–12)
NEUTS SEG # BLD: 4.5 K/UL (ref 1.7–8.2)
NEUTS SEG NFR BLD: 81 % (ref 43–78)
NRBC # BLD: 0 K/UL (ref 0–0.2)
OSMOLALITY UR: 332 MOSM/KG H2O (ref 50–1400)
PLATELET # BLD AUTO: 270 K/UL (ref 150–450)
PMV BLD AUTO: 12.1 FL (ref 9.4–12.3)
POTASSIUM SERPL-SCNC: 3.8 MMOL/L (ref 3.5–5.1)
POTASSIUM SERPL-SCNC: 3.9 MMOL/L (ref 3.5–5.1)
PROT SERPL-MCNC: 8.9 G/DL (ref 6.3–8.2)
Q-T INTERVAL, ECG07: 448 MS
QRS DURATION, ECG06: 184 MS
QTC CALCULATION (BEZET), ECG08: 497 MS
RBC # BLD AUTO: 4.42 M/UL (ref 4.05–5.2)
RBC #/AREA URNS HPF: ABNORMAL /HPF
SODIUM SERPL-SCNC: 123 MMOL/L (ref 136–145)
SODIUM SERPL-SCNC: 126 MMOL/L (ref 136–145)
SODIUM UR-SCNC: 60 MMOL/L
TROPONIN I BLD-MCNC: 0.03 NG/ML (ref 0.02–0.05)
VENTRICULAR RATE, ECG03: 74 BPM
WBC # BLD AUTO: 5.6 K/UL (ref 4.3–11.1)
WBC URNS QL MICRO: ABNORMAL /HPF

## 2018-09-07 PROCEDURE — 96365 THER/PROPH/DIAG IV INF INIT: CPT | Performed by: EMERGENCY MEDICINE

## 2018-09-07 PROCEDURE — 81003 URINALYSIS AUTO W/O SCOPE: CPT | Performed by: EMERGENCY MEDICINE

## 2018-09-07 PROCEDURE — 86580 TB INTRADERMAL TEST: CPT | Performed by: INTERNAL MEDICINE

## 2018-09-07 PROCEDURE — 71045 X-RAY EXAM CHEST 1 VIEW: CPT

## 2018-09-07 PROCEDURE — 65270000029 HC RM PRIVATE

## 2018-09-07 PROCEDURE — 74011636637 HC RX REV CODE- 636/637: Performed by: INTERNAL MEDICINE

## 2018-09-07 PROCEDURE — 74011250636 HC RX REV CODE- 250/636: Performed by: EMERGENCY MEDICINE

## 2018-09-07 PROCEDURE — 87186 SC STD MICRODIL/AGAR DIL: CPT

## 2018-09-07 PROCEDURE — 74011250636 HC RX REV CODE- 250/636: Performed by: INTERNAL MEDICINE

## 2018-09-07 PROCEDURE — 83935 ASSAY OF URINE OSMOLALITY: CPT

## 2018-09-07 PROCEDURE — 82570 ASSAY OF URINE CREATININE: CPT

## 2018-09-07 PROCEDURE — 87088 URINE BACTERIA CULTURE: CPT

## 2018-09-07 PROCEDURE — 87086 URINE CULTURE/COLONY COUNT: CPT

## 2018-09-07 PROCEDURE — 70450 CT HEAD/BRAIN W/O DYE: CPT

## 2018-09-07 PROCEDURE — 84484 ASSAY OF TROPONIN QUANT: CPT

## 2018-09-07 PROCEDURE — 82962 GLUCOSE BLOOD TEST: CPT

## 2018-09-07 PROCEDURE — 80053 COMPREHEN METABOLIC PANEL: CPT

## 2018-09-07 PROCEDURE — 36415 COLL VENOUS BLD VENIPUNCTURE: CPT

## 2018-09-07 PROCEDURE — 77030019605

## 2018-09-07 PROCEDURE — 77030020186 HC BOOT HL PROTCT SAGE -B

## 2018-09-07 PROCEDURE — 74011000302 HC RX REV CODE- 302: Performed by: INTERNAL MEDICINE

## 2018-09-07 PROCEDURE — 65390000012 HC CONDITION CODE 44 OBSERVATION

## 2018-09-07 PROCEDURE — 93005 ELECTROCARDIOGRAM TRACING: CPT | Performed by: INTERNAL MEDICINE

## 2018-09-07 PROCEDURE — 81015 MICROSCOPIC EXAM OF URINE: CPT

## 2018-09-07 PROCEDURE — 3331090001 HH PPS REVENUE CREDIT

## 2018-09-07 PROCEDURE — 85025 COMPLETE CBC W/AUTO DIFF WBC: CPT

## 2018-09-07 PROCEDURE — 96361 HYDRATE IV INFUSION ADD-ON: CPT

## 2018-09-07 PROCEDURE — 74011250637 HC RX REV CODE- 250/637: Performed by: INTERNAL MEDICINE

## 2018-09-07 PROCEDURE — 84300 ASSAY OF URINE SODIUM: CPT

## 2018-09-07 PROCEDURE — 77030020263 HC SOL INJ SOD CL0.9% LFCR 1000ML

## 2018-09-07 PROCEDURE — G0157 HHC PT ASSISTANT EA 15: HCPCS

## 2018-09-07 PROCEDURE — 74011000258 HC RX REV CODE- 258: Performed by: EMERGENCY MEDICINE

## 2018-09-07 PROCEDURE — 99285 EMERGENCY DEPT VISIT HI MDM: CPT | Performed by: EMERGENCY MEDICINE

## 2018-09-07 PROCEDURE — 3331090003 HH PPS REVENUE ADJ

## 2018-09-07 PROCEDURE — 3331090002 HH PPS REVENUE DEBIT

## 2018-09-07 RX ORDER — HEPARIN SODIUM 5000 [USP'U]/ML
5000 INJECTION, SOLUTION INTRAVENOUS; SUBCUTANEOUS EVERY 8 HOURS
Status: CANCELLED | OUTPATIENT
Start: 2018-09-07

## 2018-09-07 RX ORDER — INSULIN LISPRO 100 [IU]/ML
INJECTION, SOLUTION INTRAVENOUS; SUBCUTANEOUS
Status: DISCONTINUED | OUTPATIENT
Start: 2018-09-07 | End: 2018-09-14 | Stop reason: HOSPADM

## 2018-09-07 RX ORDER — ONDANSETRON 2 MG/ML
4 INJECTION INTRAMUSCULAR; INTRAVENOUS
Status: DISCONTINUED | OUTPATIENT
Start: 2018-09-07 | End: 2018-09-14 | Stop reason: HOSPADM

## 2018-09-07 RX ORDER — CARVEDILOL 12.5 MG/1
12.5 TABLET ORAL 2 TIMES DAILY WITH MEALS
Status: DISCONTINUED | OUTPATIENT
Start: 2018-09-07 | End: 2018-09-14 | Stop reason: HOSPADM

## 2018-09-07 RX ORDER — ACETAMINOPHEN 325 MG/1
650 TABLET ORAL
Status: DISCONTINUED | OUTPATIENT
Start: 2018-09-07 | End: 2018-09-14 | Stop reason: HOSPADM

## 2018-09-07 RX ORDER — SODIUM CHLORIDE 9 MG/ML
100 INJECTION, SOLUTION INTRAVENOUS CONTINUOUS
Status: DISCONTINUED | OUTPATIENT
Start: 2018-09-07 | End: 2018-09-09

## 2018-09-07 RX ORDER — SODIUM CHLORIDE 0.9 % (FLUSH) 0.9 %
5-10 SYRINGE (ML) INJECTION EVERY 8 HOURS
Status: DISCONTINUED | OUTPATIENT
Start: 2018-09-07 | End: 2018-09-14 | Stop reason: HOSPADM

## 2018-09-07 RX ORDER — GUAIFENESIN 100 MG/5ML
81 LIQUID (ML) ORAL DAILY
Status: DISCONTINUED | OUTPATIENT
Start: 2018-09-08 | End: 2018-09-14 | Stop reason: HOSPADM

## 2018-09-07 RX ORDER — ISOSORBIDE MONONITRATE 30 MG/1
30 TABLET, EXTENDED RELEASE ORAL DAILY
Status: DISCONTINUED | OUTPATIENT
Start: 2018-09-08 | End: 2018-09-14 | Stop reason: HOSPADM

## 2018-09-07 RX ORDER — TRAMADOL HYDROCHLORIDE 50 MG/1
50 TABLET ORAL
Status: DISCONTINUED | OUTPATIENT
Start: 2018-09-07 | End: 2018-09-14 | Stop reason: HOSPADM

## 2018-09-07 RX ORDER — SODIUM CHLORIDE 0.9 % (FLUSH) 0.9 %
5-10 SYRINGE (ML) INJECTION AS NEEDED
Status: DISCONTINUED | OUTPATIENT
Start: 2018-09-07 | End: 2018-09-14 | Stop reason: HOSPADM

## 2018-09-07 RX ADMIN — SODIUM CHLORIDE 100 ML/HR: 900 INJECTION, SOLUTION INTRAVENOUS at 18:22

## 2018-09-07 RX ADMIN — INSULIN LISPRO 6 UNITS: 100 INJECTION, SOLUTION INTRAVENOUS; SUBCUTANEOUS at 22:14

## 2018-09-07 RX ADMIN — TUBERCULIN PURIFIED PROTEIN DERIVATIVE 5 UNITS: 5 INJECTION INTRADERMAL at 16:13

## 2018-09-07 RX ADMIN — CARVEDILOL 12.5 MG: 12.5 TABLET, FILM COATED ORAL at 22:14

## 2018-09-07 RX ADMIN — Medication 10 ML: at 22:14

## 2018-09-07 RX ADMIN — CEFTRIAXONE SODIUM 1 G: 1 INJECTION, POWDER, FOR SOLUTION INTRAMUSCULAR; INTRAVENOUS at 15:01

## 2018-09-07 RX ADMIN — INSULIN LISPRO 6 UNITS: 100 INJECTION, SOLUTION INTRAVENOUS; SUBCUTANEOUS at 18:32

## 2018-09-07 NOTE — ED TRIAGE NOTES
Pt states she feels dizzy, pt denies fevers. Pt sees home health, unable to walk. Family states when nurse came in pt wasn't herself.

## 2018-09-07 NOTE — PROGRESS NOTES
09/07/18 1842 Dual Skin Pressure Injury Assessment Dual Skin Pressure Injury Assessment X Second Care Provider (Based on [address] [place] [address]) Alexadner Pino RN Foot Bilateral  
Ankle  Bilateral  
Heel  Bilateral 
(boggy) Skin Integumentary Skin Integumentary (WDL) X Skin Color Appropriate for ethnicity Skin Condition/Temp Dry; Warm  
Skin Integrity Abrasion; Wound (add Wound LDA) (to BLE, lymphedema to BLE) Turgor Non-tenting Dual skin assessment completed with Alexander Pino RN. Pt has lymphedema that are warm to the touch and have scattered hard nodules to BLE. Pt's heels are boggy, heels elevated with pillows at this time. Pt has corn starch (per daughter, at bedside) under her pannus and around the perineum area. Allevyn applied to sacral area for prevention of pressure sores at this time. Pt is currently in bed with family at bedside. Bed L/L, call light in reach, and all needs met at this time. Report given to Alexander Pino RN.

## 2018-09-07 NOTE — H&P
HOSPITALIST H&P/CONSULT 
NAME:  Comfort Santoyo Age:  80 y.o. 
:   1935 MRN:   [id number] PCP: Kaci Tong MD 
Consulting MD: Treatment Team: Attending Provider: Bailee Hull DO 
HPI:  
 
H&P initially entered by Emilee Harrison deleted in error. Pt has been independently seen and examined by myself and agree with below documentation. 79 yo female with pmh A Fib on Xarelto, CKD, DM 2, chronic systolic and diastolic HF who presented to ER this afternoon due to progressive weakness, fatigue, loss of appetite x 2 months, symptoms much worse in past few days. Pt found with Na 123, acute on chronic kidney disease, and UTI. She was started on IV fluids and abx. pt denies CP, SOB, abd pain, fevers, nausea/vomiting/diarrhea. Says she \"just didn't feel like eating\". She is bed bound with daily health aid in the home. Complete ROS done and is as stated in HPI or otherwise negative Past Medical History:  
Diagnosis Date  Acute gout involving toe of left foot 2017  CAD (coronary artery disease)  \"irregular heart beat\"  Cardiomyopathy ([address] 2009  Chronic indwelling Harper catheter  Chronic kidney disease 2002  
 on HD since 2012  Chronic systolic heart failure ([address] 2007  Colon polyps   CVA (cerebral vascular accident) ([address]  CVA (cerebral vascular accident) ([address] 2016  Deep vein thrombosis (DVT) of lower extremity ([address] 2016  Diabetes ([address] 2002  Diabetes mellitus ([address] 2012  Duodenal ulcer  Duodenal ulcer 2012  Duodenal ulcer 2012  Dyslipidemia 2016  Essential hypertension 10/26/2009  
 Heart failure ([address]  Hypertension 2009  Hypoglycemia, unspecified 12/3/2012  JOÃO (iron deficiency anemia)  Obesity, morbid 2009  Other ill-defined conditions(799.89)  Lymphedema  Persistent atrial fibrillation ([address] 10/26/2009  Pulmonary HTN ([address]  SBO (small bowel obstruction) ([facility] 8/8/2013  Sleep apnea 2010  
 non-compliant with CPAP  Sleep apnea - noncompliant with CPAP 11/13/2012  Type II diabetes mellitus, uncontrolled ([facility] 6/20/2012  Urinary tract infection 12/3/2012  UTI (lower urinary tract infection) 8/8/2013  UTI (urinary tract infection) 11/23/2012 Past Surgical History:  
Procedure Laterality Date  COLONOSCOPY N/A 6/20/2016 COLONOSCOPY performed by Torie Irizarry MD at [facility] ENDOSCOPY  
 HX COLONOSCOPY  2016  
 polyp  HX HYSTERECTOMY  >20 years ago  HX TUBAL LIGATION  >40 years ago Prior to Admission Medications Prescriptions Last Dose Informant Patient Reported? Taking?  
ascorbic acid, vitamin C, (VITAMIN C) 500 mg tablet   Yes No  
Sig: Take 500 mg by mouth daily. aspirin 81 mg tablet   Yes No  
Sig: Take 81 mg by mouth daily. carvedilol (COREG) 12.5 mg tablet   No No  
Sig: TAKE 1 TABLET BY MOUTH TWO (2) TIMES DAILY (WITH MEALS). cholecalciferol (VITAMIN D3) 1,000 unit cap   No No  
Sig: Take 1 Cap by mouth daily. furosemide (LASIX) 40 mg tablet   No No  
Sig: TAKE 2 TABS BY MOUTH EVERY TWELVE (12) HOURS. INDICATIONS: FLUID AND HEART  
gabapentin (NEURONTIN) 300 mg capsule   No No  
Sig: Take 1 Cap by mouth three (3) times daily. glimepiride (AMARYL) 1 mg tablet   No No  
Sig: TAKE 1 TAB BY MOUTH DAILY (BEFORE BREAKFAST). isosorbide mononitrate ER (IMDUR) 30 mg tablet   No No  
Sig: Take 1 Tab by mouth daily. metOLazone (ZAROXOLYN) 5 mg tablet   No No  
Sig: TAKE 1 TABLET BY MOUTH 30 MINUTES BEFORE LASIX DOSE  
pantoprazole (PROTONIX) 40 mg tablet   No No  
Sig: TAKE 1 TABLET BY MOUTH DAILY (BEFORE BREAKFAST). potassium chloride SA (MICRO-K) 10 mEq capsule   No No  
Sig: TAKE 1 CAPSULE BY MOUTH DAILY. INDICATIONS: HYPOKALEMIA  
torsemide (DEMADEX) 20 mg tablet   No No  
Sig: TAKE 1 TABLET BY MOUTH DAILY TAKE 4 TABLETS ON MON,WED,AND FRI,THEN 3 TABLETS ON TUES,THURS AND SAT Facility-Administered Medications: None Allergies Allergen Reactions  Benazepril Angioedema Tongue swelling  Dynacirc [Isradipine] Angioedema Tongue swelling  Peanut Angioedema Tongue swelling  Morphine Other (comments) Unknown rxn, \"morphine just does not agree with me\" Social History Substance Use Topics  Smoking status: Never Smoker  Smokeless tobacco: Never Used  Alcohol use No  
  
Family History Problem Relation Age of Onset  Heart Disease Sister  Diabetes Other Objective:  
 
Visit Vitals  /73 (BP 1 Location: Right arm, BP Patient Position: At rest)  Pulse 73  Temp 98.5 °F (36.9 °C)  Resp 16  
 Ht 5' 6\" (1.676 m)  Wt 99.8 kg (220 lb)  SpO2 98%  BMI 35.51 kg/m2 Temp (24hrs), Av.9 °F (36.6 °C), Min:96.7 °F (35.9 °C), Max:98.6 °F (37 °C) Oxygen Therapy O2 Sat (%): 98 % (18 1746) Pulse via Oximetry: 86 beats per minute (18 1329) O2 Device: Room air (18 1253) Physical Exam: 
General:    drowsy cooperative, no distress, appears stated age. Head:   Normocephalic, without obvious abnormality, atraumatic. Nose:  Nares normal. No drainage or sinus tenderness. Lungs:   Clear to auscultation bilaterally. No Wheezing or Rhonchi. No rales. Heart:   Regular rate and rhythm,  no murmur, rub or gallop. Abdomen:   Soft, non-tender. Not distended. Bowel sounds normal.  
Extremities: No cyanosis. No edema. No clubbing Skin:     Texture, turgor normal. No rashes or lesions. Not Jaundiced Neurologic: Alert and oriented x 3, no focal deficits Data Review:  
Recent Results (from the past 24 hour(s)) CBC WITH AUTOMATED DIFF Collection Time: 18 12:57 PM  
Result Value Ref Range WBC 5.6 4.3 - 11.1 K/uL  
 RBC 4.42 4.05 - 5.2 M/uL  
 HGB 11.7 11.7 - 15.4 g/dL HCT 36.6 35.8 - 46.3 % MCV 82.8 79.6 - 97.8 FL  
 MCH 26.5 26.1 - 32.9 PG  
 MCHC 32.0 31.4 - 35.0 g/dL RDW 14.4 % PLATELET 874 145 - 131 K/uL MPV 12.1 9.4 - 12.3 FL ABSOLUTE NRBC 0.00 0.0 - 0.2 K/uL  
 DF AUTOMATED NEUTROPHILS 81 (H) 43 - 78 % LYMPHOCYTES 10 (L) 13 - 44 % MONOCYTES 7 4.0 - 12.0 % EOSINOPHILS 1 0.5 - 7.8 % BASOPHILS 1 0.0 - 2.0 % IMMATURE GRANULOCYTES 0 0.0 - 5.0 %  
 ABS. NEUTROPHILS 4.5 1.7 - 8.2 K/UL  
 ABS. LYMPHOCYTES 0.6 0.5 - 4.6 K/UL  
 ABS. MONOCYTES 0.4 0.1 - 1.3 K/UL  
 ABS. EOSINOPHILS 0.1 0.0 - 0.8 K/UL  
 ABS. BASOPHILS 0.0 0.0 - 0.2 K/UL  
 ABS. IMM. GRANS. 0.0 0.0 - 0.5 K/UL METABOLIC PANEL, COMPREHENSIVE Collection Time: 09/07/18 12:57 PM  
Result Value Ref Range Sodium 123 (LL) 136 - 145 mmol/L Potassium 3.8 3.5 - 5.1 mmol/L Chloride 81 (L) 98 - 107 mmol/L  
 CO2 30 21 - 32 mmol/L Anion gap 12 7 - 16 mmol/L Glucose 284 (H) 65 - 100 mg/dL  (H) 8 - 23 MG/DL Creatinine 2.25 (H) 0.6 - 1.0 MG/DL  
 GFR est AA 27 (L) >60 ml/min/1.73m2 GFR est non-AA 22 (L) >60 ml/min/1.73m2 Calcium 11.0 (H) 8.3 - 10.4 MG/DL Bilirubin, total 0.6 0.2 - 1.1 MG/DL  
 ALT (SGPT) 8 (L) 12 - 65 U/L  
 AST (SGOT) 13 (L) 15 - 37 U/L Alk. phosphatase 111 50 - 136 U/L Protein, total 8.9 (H) 6.3 - 8.2 g/dL Albumin 3.2 3.2 - 4.6 g/dL Globulin 5.7 (H) 2.3 - 3.5 g/dL A-G Ratio 0.6 (L) 1.2 - 3.5 POC TROPONIN-I Collection Time: 09/07/18  1:37 PM  
Result Value Ref Range Troponin-I (POC) 0.03 0.02 - 0.05 ng/ml URINE MICROSCOPIC Collection Time: 09/07/18  2:21 PM  
Result Value Ref Range WBC  0 /hpf  
 RBC 0-3 0 /hpf Epithelial cells 0 0 /hpf Bacteria 4+ (H) 0 /hpf Casts 0-3 0 /lpf CREATININE, UR, RANDOM Collection Time: 09/07/18  2:21 PM  
Result Value Ref Range Creatinine, urine 47.80 mg/dL SODIUM, UR, RANDOM Collection Time: 09/07/18  2:21 PM  
Result Value Ref Range Sodium,urine random 60 MMOL/L  
OSMOLALITY, UR Collection Time: 09/07/18  2:21 PM  
Result Value Ref Range Osmolality,urine 332 50 - 1400 MOSM/kg H2O  
EKG, 12 LEAD, INITIAL Collection Time: 09/07/18  4:48 PM  
Result Value Ref Range Ventricular Rate 74 BPM  
 Atrial Rate 234 BPM  
 QRS Duration 184 ms Q-T Interval 448 ms QTC Calculation (Bezet) 497 ms Calculated R Axis 152 degrees Calculated T Axis 11 degrees Diagnosis    
  !! AGE AND GENDER SPECIFIC ECG ANALYSIS !! Atrial fibrillation Right bundle branch block , plus right ventricular hypertrophy Abnormal ECG When compared with ECG of 23-JUL-2018 06:17, 
Questionable change in QRS duration Confirmed by JUAN ROWAN (), Laya Ramirez ([id number]) on 9/7/2018 5:07:31 PM 
  
GLUCOSE, POC Collection Time: 09/07/18  5:39 PM  
Result Value Ref Range Glucose (POC) 282 (H) 65 - 100 mg/dL Imaging Gwenevere Streeter [facility] Chest X Ray IMPRESSION: Cardiomegaly. No acute abnormality 
  
CT HeadImpression: No Acute Abnormality 
  
Assessment and Plan: Active Hospital Problems Diagnosis Date Noted  Hyponatremia 09/07/2018  Risk for falls 04/11/2018 Pt had recent fall; no serious injury, but pt is high risk; referral sent to  to see if pt could get help getting set up for [employer].  Urinary tract infection 12/03/2012 Associated with chronic indwelling du catheter  Type II diabetes mellitus, uncontrolled ([address] 06/20/2012 Last HA1c 7.0; check today. Continue with daily self foot exams and annual eye exams.  Persistent atrial fibrillation ([address] 10/26/2009 A/P: 
  
Hyponatremia- Na 123. Likely r/t dehydration due to poor intake plus multiple diuetics. NS @ 100, BMP q 8 hrs. Obtain urine studies.  
  
UTI- Follow culture. Cont Rocephin.   
  
Chronic systolic and diastolic HF-  Diruetics on hold given SAMAN and dehydration.   Careful with fluid resuscitation.  
  
CKD- Cr 2.25, baseline appears around 2 but has gradually been increasing.   
  
 Hx A Fib- Cont Xarelto, but decrease dose 15 mg which should likely be her dose at d/c due to CKD. Rate controlled, cont home regimen Coreg. Obtain EKG.  
  
Bed bound- x 2 months. Consult PT,OT.  
  
DM 2- Obtain A1C. Humalog SSI achs while inpatient.  
  
Lymphedema- Consult wound care or OT for wraps.  
  
DC planning- Consult SW.  Likely d/c back home with caretaker but place ppd incase STR indicated.  
  
DVT Prophylaxis:  Xarelto Code Status: Full Anticipated discharge: 48-72 hours Signed By: Genet Galvez DO September 7, 2018

## 2018-09-07 NOTE — PROGRESS NOTES
TRANSFER - IN REPORT: 
 
Verbal report received from HARJINDER GARCIA(name) on [address] [place] [address],[address]  being received from ER(unit) for routine progression of care Report consisted of patients Situation, Background, Assessment and  
Recommendations(SBAR). Information from the following report(s) SBAR, Kardex, ED Summary, Intake/Output, MAR and Recent Results was reviewed with the receiving nurse. Opportunity for questions and clarification was provided. Assessment completed upon patients arrival to unit and care assumed.

## 2018-09-07 NOTE — ED PROVIDER NOTES
[facility] [place] [facility] Mag Dawn is a 80 y.o. female seen at [facility] EMERGENCY DEPT in room ER01/01 CDNotes Templates Emergency Department Chief Complaint:  Weakness, dizziness, spinning sensation HPI: 
80-year-old female presents to the emergency department with weakness, dizziness, spinning. Started yesterday around noon. More than 24 hours ago Patient states she's been unable to walk for 3 weeks. Denies any trauma or specific incident. She lives at home with her  The dizziness was first noticed yesterday at noon Severity of symptoms is described as moderate Onset of symptoms was sudden The patient describes the symptoms as dizzy Symptoms are exacerbated by sitting up or standing up Symptoms are alleviated by laying down Associated symptoms include none Time last known well: yesterday around lunchtime Historian:  
Patient and family Review of Systems: 
Include pertinent positives and negatives CONST:  Denies: fever, chills ENT:  Denies: sore throat, nasal congestion EYES:  Denies: vision changes CARDIO: Denies: chest pain, palpitations RESP:  Denies: cough, shortness of breath GI:  No vomiting no diarrhea; she does report decreased by mouth intake :  Denies: dysuria, hematuria, urinary frequency MUSC: Denies: muscle aches, joint pain SKIN:  Denies: rash NEURO: No focal weakness Past Medical History: 
Past Medical History:  
Diagnosis Date  Acute gout involving toe of left foot 5/19/2017  CAD (coronary artery disease) 2007 \"irregular heart beat\"  Cardiomyopathy ([address] 11/25/2009  Chronic indwelling Harper catheter  Chronic kidney disease 2002  
 on HD since Nov 2012  Chronic systolic heart failure ([address] 2007  Colon polyps 2016  CVA (cerebral vascular accident) ([address]  CVA (cerebral vascular accident) ([address] 8/8/2016  Deep vein thrombosis (DVT) of lower extremity ([address] 5/27/2016  Diabetes ([facility] [facility] 2002  Diabetes mellitus ([address] 6/20/2012  Duodenal ulcer  Duodenal ulcer 11/18/2012  Duodenal ulcer 11/18/2012  Dyslipidemia 8/8/2016  Essential hypertension 10/26/2009  
 Heart failure ([address]  Hypertension 2009  Hypoglycemia, unspecified 12/3/2012  JOÃO (iron deficiency anemia)  Obesity, morbid 11/25/2009  Other ill-defined conditions(799.89) 2002 Lymphedema  Persistent atrial fibrillation ([address] 10/26/2009  Pulmonary HTN ([address]  SBO (small bowel obstruction) ([address] 8/8/2013  Sleep apnea 2010  
 non-compliant with CPAP  Sleep apnea - noncompliant with CPAP 11/13/2012  Type II diabetes mellitus, uncontrolled ([address] 6/20/2012  Urinary tract infection 12/3/2012  UTI (lower urinary tract infection) 8/8/2013  UTI (urinary tract infection) 11/23/2012 Past Surgical History:  
Procedure Laterality Date  COLONOSCOPY N/A 6/20/2016 COLONOSCOPY performed by Devin Pascual MD at [facility] ENDOSCOPY  
 HX COLONOSCOPY  2016  
 polyp  HX HYSTERECTOMY  >20 years ago  HX TUBAL LIGATION  >40 years ago Social History Substance Use Topics  Smoking status: Never Smoker  Smokeless tobacco: Never Used  Alcohol use No  
 
Family History Problem Relation Age of Onset  Heart Disease Sister  Diabetes Other Previous Medications ASCORBIC ACID, VITAMIN C, (VITAMIN C) 500 MG TABLET    Take 500 mg by mouth daily. ASPIRIN 81 MG TABLET    Take 81 mg by mouth daily. CARVEDILOL (COREG) 12.5 MG TABLET    TAKE 1 TABLET BY MOUTH TWO (2) TIMES DAILY (WITH MEALS). CHOLECALCIFEROL (VITAMIN D3) 1,000 UNIT CAP    Take 1 Cap by mouth daily. FUROSEMIDE (LASIX) 40 MG TABLET    TAKE 2 TABS BY MOUTH EVERY TWELVE (12) HOURS. INDICATIONS: FLUID AND HEART  
 GABAPENTIN (NEURONTIN) 300 MG CAPSULE    Take 1 Cap by mouth three (3) times daily. GLIMEPIRIDE (AMARYL) 1 MG TABLET    TAKE 1 TAB BY MOUTH DAILY (BEFORE BREAKFAST). ISOSORBIDE MONONITRATE ER (IMDUR) 30 MG TABLET    Take 1 Tab by mouth daily. METOLAZONE (ZAROXOLYN) 5 MG TABLET    TAKE 1 TABLET BY MOUTH 30 MINUTES BEFORE LASIX DOSE PANTOPRAZOLE (PROTONIX) 40 MG TABLET    TAKE 1 TABLET BY MOUTH DAILY (BEFORE BREAKFAST). POTASSIUM CHLORIDE SA (MICRO-K) 10 MEQ CAPSULE    TAKE 1 CAPSULE BY MOUTH DAILY. INDICATIONS: HYPOKALEMIA PREDNISONE (STERAPRED) 5 MG DOSE PACK    Take 5 mg by mouth daily. RIVAROXABAN (XARELTO) 15 MG (42)- 20 MG (9) DSPK    Take 15mg tablet twice daily for days 1-21 followed by 20mg tablet once daily for days 22-30. SPIRONOLACTONE (ALDACTONE) 25 MG TABLET    Take 1 Tab by mouth two (2) times a day. Indications: heart failure TORSEMIDE (DEMADEX) 20 MG TABLET    TAKE 1 TABLET BY MOUTH DAILY TAKE 4 TABLETS ON MON,WED,AND FRI,THEN 3 TABLETS ON TUES,THURS AND SAT  
 TRAMADOL (ULTRAM) 50 MG TABLET    Take 1 Tab by mouth every six (6) hours as needed for Pain. Max Daily Amount: 200 mg. Allergies as of 09/07/2018 - Review Complete 09/07/2018 Allergen Reaction Noted  Benazepril Angioedema 10/28/2009  Dynacirc [isradipine] Angioedema 10/28/2009  Peanut Angioedema 10/28/2009  Morphine Other (comments) 05/28/2018 Physical Exam:   
Vital signs:  
Visit Vitals  /75  Pulse 85  Temp 98.6 °F (37 °C)  Resp 16  
 Ht 5' 6\" (1.676 m)  Wt 99.8 kg (220 lb)  SpO2 99%  BMI 35.51 kg/m2 Vital signs were reviewed. Pulse oximetry interpretation: normal 
 
General Appear: Elderly nontoxic-appearing female Ears/Nose/Throat: pharynx clear, neck his membranes are pale Eyes:   PERRL, EOMI Cardiovascular: Regular no murmurs Respiratory:  clear to auscultation bilaterally, no wheezes, rales, ronchi Abdomen:  Soft nontender without rebound masses or guarding Musculoskeletal: Chronic lymphedema of the bilateral lower extremities Skin:   dry, no rash Neurologic:  Bilateral upper 70s or weak. 
 
_______________________________________________________________________ LABS/RADIOLOGY/EKG: 
 
Labs:  
 
Results for orders placed or performed during the hospital encounter of 09/07/18 CBC WITH AUTOMATED DIFF Result Value Ref Range WBC 5.6 4.3 - 11.1 K/uL  
 RBC 4.42 4.05 - 5.2 M/uL  
 HGB 11.7 11.7 - 15.4 g/dL HCT 36.6 35.8 - 46.3 % MCV 82.8 79.6 - 97.8 FL  
 MCH 26.5 26.1 - 32.9 PG  
 MCHC 32.0 31.4 - 35.0 g/dL  
 RDW 14.4 % PLATELET 476 321 - 865 K/uL MPV 12.1 9.4 - 12.3 FL ABSOLUTE NRBC 0.00 0.0 - 0.2 K/uL  
 DF AUTOMATED NEUTROPHILS 81 (H) 43 - 78 % LYMPHOCYTES 10 (L) 13 - 44 % MONOCYTES 7 4.0 - 12.0 % EOSINOPHILS 1 0.5 - 7.8 % BASOPHILS 1 0.0 - 2.0 % IMMATURE GRANULOCYTES 0 0.0 - 5.0 %  
 ABS. NEUTROPHILS 4.5 1.7 - 8.2 K/UL  
 ABS. LYMPHOCYTES 0.6 0.5 - 4.6 K/UL  
 ABS. MONOCYTES 0.4 0.1 - 1.3 K/UL  
 ABS. EOSINOPHILS 0.1 0.0 - 0.8 K/UL  
 ABS. BASOPHILS 0.0 0.0 - 0.2 K/UL  
 ABS. IMM. GRANS. 0.0 0.0 - 0.5 K/UL METABOLIC PANEL, COMPREHENSIVE Result Value Ref Range Sodium 123 (LL) 136 - 145 mmol/L Potassium 3.8 3.5 - 5.1 mmol/L Chloride 81 (L) 98 - 107 mmol/L  
 CO2 30 21 - 32 mmol/L Anion gap 12 7 - 16 mmol/L Glucose 284 (H) 65 - 100 mg/dL  (H) 8 - 23 MG/DL Creatinine 2.25 (H) 0.6 - 1.0 MG/DL  
 GFR est AA 27 (L) >60 ml/min/1.73m2 GFR est non-AA 22 (L) >60 ml/min/1.73m2 Calcium 11.0 (H) 8.3 - 10.4 MG/DL Bilirubin, total 0.6 0.2 - 1.1 MG/DL  
 ALT (SGPT) 8 (L) 12 - 65 U/L  
 AST (SGOT) 13 (L) 15 - 37 U/L Alk. phosphatase 111 50 - 136 U/L Protein, total 8.9 (H) 6.3 - 8.2 g/dL Albumin 3.2 3.2 - 4.6 g/dL Globulin 5.7 (H) 2.3 - 3.5 g/dL A-G Ratio 0.6 (L) 1.2 - 3.5 URINE MICROSCOPIC Result Value Ref Range WBC  0 /hpf  
 RBC 0-3 0 /hpf Epithelial cells 0 0 /hpf Bacteria 4+ (H) 0 /hpf Casts 0-3 0 /lpf POC TROPONIN-I  
 Result Value Ref Range Troponin-I (POC) 0.03 0.02 - 0.05 ng/ml Labs were reviewed and interpreted by me. Radiology studies performed: 
 
CT HEAD WO CONT Final Result Impression: No Acute Abnormality XR CHEST SNGL V Final Result IMPRESSION: Cardiomegaly. No acute abnormality  
  
 
________________________________________________________________________ Progress: 
 
70-year-old female with weakness not feeling well Dizzy Noted to be hyponatremic. We'll admit to the hospitalist service 
_______________________________________________________________________ Condition:  fair Disposition:  admit Diagnosis: 1. Hyponatremia 2. Debility 3. Acute cystitis without hematuria Gurdeep Arenas M.D. 
 
 
Nicky Sun; version 2.0; revised April, 2016.

## 2018-09-07 NOTE — H&P
HOSPITALIST H&P/CONSULT 
NAME:  Denisa Palacio Age:  80 y.o. 
:   1935 MRN:   [id number] PCP: Jin Lora MD 
Consulting MD: Treatment Team: Attending Provider: Carrillo Pandey MD; Primary Nurse: Renaldo Kirby HPI:  
 
Pt is a 79 yo female with pmh A Fib on Xarelto, CKD, DM 2, chronic systolic and diastolic HF who presented to ER this afternoon due to progressive weakness, fatigue, loss of appetite x 2 months, symptoms much worse in past few days. Pt found with Na 123, acute on chronic kidney disease, and UTI. She was started on IV fluids and abx. Family assists with HPI, states pt has been bed bound for 2 months. Pt describes steady overall decline. She denies CP, SOB, abd pain, fevers, nausea/vomiting/diarrhea. Multiple family members at bedside. Complete ROS done and is as stated in HPI or otherwise negative Past Medical History:  
Diagnosis Date  Acute gout involving toe of left foot 2017  CAD (coronary artery disease)  \"irregular heart beat\"  Cardiomyopathy ([address] 2009  Chronic indwelling Harper catheter  Chronic kidney disease 2002  
 on HD since 2012  Chronic systolic heart failure ([address] 2007  Colon polyps   CVA (cerebral vascular accident) ([address]  CVA (cerebral vascular accident) ([address] 2016  Deep vein thrombosis (DVT) of lower extremity ([address] 2016  Diabetes ([address] 2002  Diabetes mellitus ([address] 2012  Duodenal ulcer  Duodenal ulcer 2012  Duodenal ulcer 2012  Dyslipidemia 2016  Essential hypertension 10/26/2009  
 Heart failure ([address]  Hypertension 2009  Hypoglycemia, unspecified 12/3/2012  JOÃO (iron deficiency anemia)  Obesity, morbid 2009  Other ill-defined conditions(799.89)  Lymphedema  Persistent atrial fibrillation ([address] 10/26/2009  Pulmonary HTN ([address]  SBO (small bowel obstruction) ([address] 2013  Sleep apnea  non-compliant with CPAP  Sleep apnea - noncompliant with CPAP 11/13/2012  Type II diabetes mellitus, uncontrolled ([facility] 6/20/2012  Urinary tract infection 12/3/2012  UTI (lower urinary tract infection) 8/8/2013  UTI (urinary tract infection) 11/23/2012 Past Surgical History:  
Procedure Laterality Date  COLONOSCOPY N/A 6/20/2016 COLONOSCOPY performed by Josh Barba MD at [facility] ENDOSCOPY  
 HX COLONOSCOPY  2016  
 polyp  HX HYSTERECTOMY  >20 years ago  HX TUBAL LIGATION  >40 years ago Prior to Admission Medications Prescriptions Last Dose Informant Patient Reported? Taking?  
ascorbic acid, vitamin C, (VITAMIN C) 500 mg tablet   Yes No  
Sig: Take 500 mg by mouth daily. aspirin 81 mg tablet   Yes No  
Sig: Take 81 mg by mouth daily. carvedilol (COREG) 12.5 mg tablet   No No  
Sig: TAKE 1 TABLET BY MOUTH TWO (2) TIMES DAILY (WITH MEALS). cholecalciferol (VITAMIN D3) 1,000 unit cap   No No  
Sig: Take 1 Cap by mouth daily. furosemide (LASIX) 40 mg tablet   No No  
Sig: TAKE 2 TABS BY MOUTH EVERY TWELVE (12) HOURS. INDICATIONS: FLUID AND HEART  
gabapentin (NEURONTIN) 300 mg capsule   No No  
Sig: Take 1 Cap by mouth three (3) times daily. glimepiride (AMARYL) 1 mg tablet   No No  
Sig: TAKE 1 TAB BY MOUTH DAILY (BEFORE BREAKFAST). isosorbide mononitrate ER (IMDUR) 30 mg tablet   No No  
Sig: Take 1 Tab by mouth daily. metOLazone (ZAROXOLYN) 5 mg tablet   No No  
Sig: TAKE 1 TABLET BY MOUTH 30 MINUTES BEFORE LASIX DOSE  
pantoprazole (PROTONIX) 40 mg tablet   No No  
Sig: TAKE 1 TABLET BY MOUTH DAILY (BEFORE BREAKFAST). potassium chloride SA (MICRO-K) 10 mEq capsule   No No  
Sig: TAKE 1 CAPSULE BY MOUTH DAILY. INDICATIONS: HYPOKALEMIA  
predniSONE (STERAPRED) 5 mg dose pack   Yes No  
Sig: Take 5 mg by mouth daily.   
rivaroxaban (XARELTO) 15 mg (42)- 20 mg (9) DsPk   No No  
Sig: Take 15mg tablet twice daily for days 1-21 followed by 20mg tablet once daily for days 22-30.  
spironolactone (ALDACTONE) 25 mg tablet   No No  
Sig: Take 1 Tab by mouth two (2) times a day. Indications: heart failure  
torsemide (DEMADEX) 20 mg tablet   No No  
Sig: TAKE 1 TABLET BY MOUTH DAILY TAKE 4 TABLETS ON MON,WED,AND FRI,THEN 3 TABLETS ON TUES,THURS AND SAT  
traMADol (ULTRAM) 50 mg tablet   No No  
Sig: Take 1 Tab by mouth every six (6) hours as needed for Pain. Max Daily Amount: 200 mg. Facility-Administered Medications: None Allergies Allergen Reactions  Benazepril Angioedema Tongue swelling  Dynacirc [Isradipine] Angioedema Tongue swelling  Peanut Angioedema Tongue swelling  Morphine Other (comments) Unknown rxn, \"morphine just does not agree with me\" Social History Substance Use Topics  Smoking status: Never Smoker  Smokeless tobacco: Never Used  Alcohol use No  
  
Family History Problem Relation Age of Onset  Heart Disease Sister  Diabetes Other Objective:  
 
Visit Vitals  /73  Pulse 86  Temp 98.6 °F (37 °C)  Resp 16  
 Ht 5' 6\" (1.676 m)  Wt 99.8 kg (220 lb)  SpO2 97%  BMI 35.51 kg/m2 Temp (24hrs), Av.6 °F (37 °C), Min:98.6 °F (37 °C), Max:98.6 °F (37 °C) Oxygen Therapy O2 Sat (%): 97 % (18 1329) Pulse via Oximetry: 86 beats per minute (18 1329) O2 Device: Room air (18 1253) Physical Exam: 
General:    Drowsy, cooperative, no distress, appears stated age. Head:   Normocephalic, without obvious abnormality, atraumatic. Nose:  Nares normal. No drainage or sinus tenderness. Lungs:   Clear to auscultation bilaterally. No Wheezing or Rhonchi. No rales. Heart:   Regular rate and rhythm,  no murmur, rub or gallop. Abdomen:   Soft, non-tender. Not distended. Bowel sounds normal.  
Extremities: No cyanosis. No edema. No clubbing Skin:     Texture, turgor normal. No rashes or lesions. Not Jaundiced Neurologic: Alert and oriented x 3, no focal deficits Data Review:  
Recent Results (from the past 24 hour(s)) CBC WITH AUTOMATED DIFF Collection Time: 09/07/18 12:57 PM  
Result Value Ref Range WBC 5.6 4.3 - 11.1 K/uL  
 RBC 4.42 4.05 - 5.2 M/uL  
 HGB 11.7 11.7 - 15.4 g/dL HCT 36.6 35.8 - 46.3 % MCV 82.8 79.6 - 97.8 FL  
 MCH 26.5 26.1 - 32.9 PG  
 MCHC 32.0 31.4 - 35.0 g/dL  
 RDW 14.4 % PLATELET 779 179 - 753 K/uL MPV 12.1 9.4 - 12.3 FL ABSOLUTE NRBC 0.00 0.0 - 0.2 K/uL  
 DF AUTOMATED NEUTROPHILS 81 (H) 43 - 78 % LYMPHOCYTES 10 (L) 13 - 44 % MONOCYTES 7 4.0 - 12.0 % EOSINOPHILS 1 0.5 - 7.8 % BASOPHILS 1 0.0 - 2.0 % IMMATURE GRANULOCYTES 0 0.0 - 5.0 %  
 ABS. NEUTROPHILS 4.5 1.7 - 8.2 K/UL  
 ABS. LYMPHOCYTES 0.6 0.5 - 4.6 K/UL  
 ABS. MONOCYTES 0.4 0.1 - 1.3 K/UL  
 ABS. EOSINOPHILS 0.1 0.0 - 0.8 K/UL  
 ABS. BASOPHILS 0.0 0.0 - 0.2 K/UL  
 ABS. IMM. GRANS. 0.0 0.0 - 0.5 K/UL METABOLIC PANEL, COMPREHENSIVE Collection Time: 09/07/18 12:57 PM  
Result Value Ref Range Sodium 123 (LL) 136 - 145 mmol/L Potassium 3.8 3.5 - 5.1 mmol/L Chloride 81 (L) 98 - 107 mmol/L  
 CO2 30 21 - 32 mmol/L Anion gap 12 7 - 16 mmol/L Glucose 284 (H) 65 - 100 mg/dL  (H) 8 - 23 MG/DL Creatinine 2.25 (H) 0.6 - 1.0 MG/DL  
 GFR est AA 27 (L) >60 ml/min/1.73m2 GFR est non-AA 22 (L) >60 ml/min/1.73m2 Calcium 11.0 (H) 8.3 - 10.4 MG/DL Bilirubin, total 0.6 0.2 - 1.1 MG/DL  
 ALT (SGPT) 8 (L) 12 - 65 U/L  
 AST (SGOT) 13 (L) 15 - 37 U/L Alk. phosphatase 111 50 - 136 U/L Protein, total 8.9 (H) 6.3 - 8.2 g/dL Albumin 3.2 3.2 - 4.6 g/dL Globulin 5.7 (H) 2.3 - 3.5 g/dL A-G Ratio 0.6 (L) 1.2 - 3.5 POC TROPONIN-I Collection Time: 09/07/18  1:37 PM  
Result Value Ref Range Troponin-I (POC) 0.03 0.02 - 0.05 ng/ml URINE MICROSCOPIC Collection Time: 09/07/18  2:21 PM  
Result Value Ref Range  WBC  0 /hpf  
 RBC 0-3 0 /hpf  
 Epithelial cells 0 0 /hpf Bacteria 4+ (H) 0 /hpf Casts 0-3 0 /lpf Imaging Doylene Vilchis [employer] Chest X Ray IMPRESSION: Cardiomegaly. No acute abnormality CT HeadImpression: No Acute Abnormality Assessment and Plan: Active Hospital Problems Diagnosis Date Noted  Hyponatremia 09/07/2018 A/P: 
 
Hyponatremia- Na 123. Likely r/t dehydration due to poor intake plus multiple diuetics. NS @ 100, BMP q 8 hrs. Obtain urine studies. UTI- Follow culture. Cont Rocephin. Chronic systolic and diastolic HF-  Diruetics on hold given SAMAN and dehydration. Careful with fluid resuscitation. CKD- Cr 2.25, baseline appears around 2 but has gradually been increasing. Hx A Fib- Cont Xarelto, but decrease dose 15 mg which should likely be her dose at d/c due to CKD. Rate controlled, cont home regimen Coreg. Obtain EKG. Bed bound- x 2 months. Consult PT,OT. DM 2- Obtain A1C. Humalog SSI achs while inpatient. Lymphedema- Consult wound care or OT for wraps. DC planning- Consult SW.  Likely d/c back home with caretaker but place ppd incase STR indicated. DVT Prophylaxis:  Xarelto Code Status: Full Anticipated discharge: 48-72 hours Signed By: Daija Chau NP September 7, 2018

## 2018-09-07 NOTE — ROUTINE PROCESS
TRANSFER - OUT REPORT: 
 
Verbal report given to 6th floor RN on [address] [place] [address],[address]  being transferred to 6th Floor for routine progression of care Report consisted of patients Situation, Background, Assessment and  
Recommendations(SBAR). Information from the following report(s) SBAR was reviewed with the receiving nurse. Lines:  
Peripheral IV 09/07/18 Left Wrist (Active) Site Assessment Clean, dry, & intact 9/7/2018 12:56 PM  
Phlebitis Assessment 0 9/7/2018 12:56 PM  
Infiltration Assessment 0 9/7/2018 12:56 PM  
Dressing Status Clean, dry, & intact 9/7/2018 12:56 PM  
  
 
Opportunity for questions and clarification was provided. Patient transported with: 
 [employer]

## 2018-09-08 LAB
ANION GAP SERPL CALC-SCNC: 13 MMOL/L (ref 7–16)
ANION GAP SERPL CALC-SCNC: 15 MMOL/L (ref 7–16)
BUN SERPL-MCNC: 151 MG/DL (ref 8–23)
BUN SERPL-MCNC: 167 MG/DL (ref 8–23)
CALCIUM SERPL-MCNC: 10.5 MG/DL (ref 8.3–10.4)
CALCIUM SERPL-MCNC: 9.8 MG/DL (ref 8.3–10.4)
CHLORIDE SERPL-SCNC: 86 MMOL/L (ref 98–107)
CHLORIDE SERPL-SCNC: 86 MMOL/L (ref 98–107)
CO2 SERPL-SCNC: 28 MMOL/L (ref 21–32)
CO2 SERPL-SCNC: 30 MMOL/L (ref 21–32)
CREAT SERPL-MCNC: 2.01 MG/DL (ref 0.6–1)
CREAT SERPL-MCNC: 2.09 MG/DL (ref 0.6–1)
EST. AVERAGE GLUCOSE BLD GHB EST-MCNC: 189 MG/DL
GLUCOSE BLD STRIP.AUTO-MCNC: 206 MG/DL (ref 65–100)
GLUCOSE BLD STRIP.AUTO-MCNC: 247 MG/DL (ref 65–100)
GLUCOSE BLD STRIP.AUTO-MCNC: 268 MG/DL (ref 65–100)
GLUCOSE BLD STRIP.AUTO-MCNC: 330 MG/DL (ref 65–100)
GLUCOSE SERPL-MCNC: 181 MG/DL (ref 65–100)
GLUCOSE SERPL-MCNC: 294 MG/DL (ref 65–100)
HBA1C MFR BLD: 8.2 % (ref 4.8–6)
MM INDURATION POC: NORMAL MM (ref 0–5)
POTASSIUM SERPL-SCNC: 3.5 MMOL/L (ref 3.5–5.1)
POTASSIUM SERPL-SCNC: 3.7 MMOL/L (ref 3.5–5.1)
PPD POC: NORMAL NEGATIVE
SODIUM SERPL-SCNC: 129 MMOL/L (ref 136–145)
SODIUM SERPL-SCNC: 129 MMOL/L (ref 136–145)

## 2018-09-08 PROCEDURE — 74011250636 HC RX REV CODE- 250/636: Performed by: INTERNAL MEDICINE

## 2018-09-08 PROCEDURE — 96366 THER/PROPH/DIAG IV INF ADDON: CPT

## 2018-09-08 PROCEDURE — 3331090001 HH PPS REVENUE CREDIT

## 2018-09-08 PROCEDURE — 36415 COLL VENOUS BLD VENIPUNCTURE: CPT

## 2018-09-08 PROCEDURE — 74011000258 HC RX REV CODE- 258: Performed by: INTERNAL MEDICINE

## 2018-09-08 PROCEDURE — 80048 BASIC METABOLIC PNL TOTAL CA: CPT

## 2018-09-08 PROCEDURE — 97110 THERAPEUTIC EXERCISES: CPT

## 2018-09-08 PROCEDURE — 83036 HEMOGLOBIN GLYCOSYLATED A1C: CPT

## 2018-09-08 PROCEDURE — 82962 GLUCOSE BLOOD TEST: CPT

## 2018-09-08 PROCEDURE — 77030020263 HC SOL INJ SOD CL0.9% LFCR 1000ML

## 2018-09-08 PROCEDURE — 74011636637 HC RX REV CODE- 636/637: Performed by: INTERNAL MEDICINE

## 2018-09-08 PROCEDURE — 97163 PT EVAL HIGH COMPLEX 45 MIN: CPT

## 2018-09-08 PROCEDURE — 74011250637 HC RX REV CODE- 250/637: Performed by: INTERNAL MEDICINE

## 2018-09-08 PROCEDURE — 65270000029 HC RM PRIVATE

## 2018-09-08 PROCEDURE — 96361 HYDRATE IV INFUSION ADD-ON: CPT

## 2018-09-08 PROCEDURE — 3331090002 HH PPS REVENUE DEBIT

## 2018-09-08 PROCEDURE — 65390000012 HC CONDITION CODE 44 OBSERVATION

## 2018-09-08 RX ADMIN — ISOSORBIDE MONONITRATE 30 MG: 30 TABLET, EXTENDED RELEASE ORAL at 08:25

## 2018-09-08 RX ADMIN — CEFTRIAXONE SODIUM 1 G: 1 INJECTION, POWDER, FOR SOLUTION INTRAMUSCULAR; INTRAVENOUS at 14:17

## 2018-09-08 RX ADMIN — SODIUM CHLORIDE 100 ML/HR: 900 INJECTION, SOLUTION INTRAVENOUS at 04:46

## 2018-09-08 RX ADMIN — RIVAROXABAN 15 MG: 15 TABLET, FILM COATED ORAL at 08:26

## 2018-09-08 RX ADMIN — INSULIN LISPRO 4 UNITS: 100 INJECTION, SOLUTION INTRAVENOUS; SUBCUTANEOUS at 16:36

## 2018-09-08 RX ADMIN — SODIUM CHLORIDE 100 ML/HR: 900 INJECTION, SOLUTION INTRAVENOUS at 16:04

## 2018-09-08 RX ADMIN — CARVEDILOL 12.5 MG: 12.5 TABLET, FILM COATED ORAL at 16:36

## 2018-09-08 RX ADMIN — INSULIN LISPRO 4 UNITS: 100 INJECTION, SOLUTION INTRAVENOUS; SUBCUTANEOUS at 08:27

## 2018-09-08 RX ADMIN — ASPIRIN 81 MG 81 MG: 81 TABLET ORAL at 08:25

## 2018-09-08 RX ADMIN — Medication 10 ML: at 22:32

## 2018-09-08 RX ADMIN — INSULIN LISPRO 6 UNITS: 100 INJECTION, SOLUTION INTRAVENOUS; SUBCUTANEOUS at 11:48

## 2018-09-08 RX ADMIN — INSULIN LISPRO 8 UNITS: 100 INJECTION, SOLUTION INTRAVENOUS; SUBCUTANEOUS at 22:34

## 2018-09-08 RX ADMIN — CARVEDILOL 12.5 MG: 12.5 TABLET, FILM COATED ORAL at 08:25

## 2018-09-08 NOTE — PROGRESS NOTES
Problem: Nutrition Deficit Goal: *Optimize nutritional status Nutrition Reason for assessment: Referral received from nursing admission Malnutrition Screening Tool for unsure weight loss and eating poorly due to decreased appetite. Assessment:  
Diet order(s): Cardiac, Glucerna TID Food/Nutrition Patient History:  The patient is noted to have a h/o diabetes, A-fib, UTI, CAD, obesity, lymphedema, CHF, CKD, SBO and CVA. The patient reports that she is not sure of any recent weight loss. She reports a UBW of 220#. She states that her appetite is doing well. She denies any po difficulties at this time. Weight history in the EMR cannot be verified as accurate due to unknown weight source (pt stated vs estimated vs measured). Weight Loss Metrics 9/7/2018 8/16/2018 7/23/2018 5/28/2018 Today's Wt 220 lb 219 lb 200 lb 235 lb BMI 35.51 kg/m2 34.3 kg/m2 31.32 kg/m2 36.81 kg/m2 Weight Loss Metrics 5/15/2018 5/12/2018 4/11/2018 1/30/2018 Today's Wt 248 lb 247 lb 238 lb 226 lb BMI 40.03 kg/m2 39.87 kg/m2 38.41 kg/m2 36.48 kg/m2 According to the EMR the patient is with a potential weight loss of ~6# over the past ~7 months. This is clinically insignificant. Anthropometrics:Height: 5' 6\" (167.6 cm),  Weight: 99.8 kg (220 lb), Weight Source: unknown, Body mass index is 35.51 kg/(m^2). BMI class of overweight for age >71. Macronutrient needs: EER:  7371-9035 kcal /day (13-18 kcal/kg listed BW) EPR:  59-77 grams protein/day (1-1.3 grams/kg IBW) Intake/Comparative Standards: Too be determined-no po intakes have been recorded at this time. Nutrition Diagnosis: No acute nutrition related diagnosis at this time. Intervention: 
Meals and snacks: Continue current diet. Nutrition Supplement Therapy: Continue Glucerna TID Nutrition Discharge Plan: Too soon to be determined Bevely Flatter Ismael [address] [place] [address], [phone]

## 2018-09-08 NOTE — PROGRESS NOTES
Hospitalist Progress Note NAME:  Александр aMrsh Age:  80 y.o. 
:   1935 MRN:   [id number] PCP: Manohar Calhoun MD 
Consulting MD: Treatment Team: Attending Provider: Chris Godfrey DO; Utilization Review: Susanne Solomon RN 
HPI:  
 
 
 
 
81 yo female with pmh A Fib on Xarelto, CKD, DM 2, chronic systolic and diastolic HF who presented to ER yesterday afternoon and subsequently admitted due to progressive weakness, fatigue, loss of appetite x 2 months, symptoms much worse in past few days. Pt found with Na 123, acute on chronic kidney disease, and UTI. She was started on IV fluids and abx. pt denied CP, SOB, abd pain, fevers, nausea/vomiting/diarrhea. Says she \"just didn't feel like eating\". She is bed bound with daily health aid in the home. Pt. States she is feeling better and eating today. Denies chest pain, SOB, nausea, vomiting, chills or fever. Complete ROS done and is as stated in HPI or otherwise negative Social History Substance Use Topics  Smoking status: Never Smoker  Smokeless tobacco: Never Used  Alcohol use No  
  
Family History Problem Relation Age of Onset  Heart Disease Sister  Diabetes Other Objective:  
 
Visit Vitals  /73 (BP 1 Location: Right arm, BP Patient Position: At rest)  Pulse 77  Temp 97.8 °F (36.6 °C)  Resp 16  
 Ht 5' 6\" (1.676 m)  Wt 99.8 kg (220 lb)  SpO2 95%  BMI 35.51 kg/m2 Temp (24hrs), Av.8 °F (36.6 °C), Min:97.4 °F (36.3 °C), Max:98.6 °F (37 °C) Oxygen Therapy O2 Sat (%): 95 % (18 1028) Pulse via Oximetry: 86 beats per minute (18 1329) O2 Device: Room air (18 1253) Physical Exam: 
General:    Somnolent but answers questions. Lungs:   Clear to auscultation bilaterally. No Wheezing or Rhonchi. No rales. Heart:   Regular rate and rhythm,  no murmur, rub or gallop. Abdomen:   Soft, non-tender. Not distended. Bowel sounds normal.  
Extremities: No cyanosis. No edema. No clubbing Skin:     Texture, turgor normal. No rashes or lesions. Not Jaundiced Neurologic: No focal deficits identified. Data Review: Na+ slightly improved. BUN still quite elevated. Creat. Improved. 
 
 
  
Assessment and Plan: Active Hospital Problems Diagnosis Date Noted  Hyponatremia 09/07/2018  Risk for falls 04/11/2018 Pt had recent fall; no serious injury, but pt is high risk; referral sent to  to see if pt could get help getting set up for [employer].  Urinary tract infection 12/03/2012 Associated with chronic indwelling du catheter  Type II diabetes mellitus, uncontrolled ([facility] 06/20/2012 Last HA1c 7.0; check today. Continue with daily self foot exams and annual eye exams.  Persistent atrial fibrillation ([facility] 10/26/2009 A/P: 
  
1. Hyponatremia: Na+ improved to 129. Likely due to dehydration and poor intake plus multiple diuetics. NS @ 100, BMP q 8 hrs. Will continue to follow BMP. If no improvement with fluids and discontinuation of diuretics, possibly consider consulting nephrology. 
  
2. UTI: urine Cx NGTD. Wait for final report.   
  
3. Chronic systolic and diastolic HF:  Diruetics on hold given SAMAN and dehydration. Careful with fluid resuscitation.  
  
4. CKD: Cr 2.25, baseline appears around 2 but has gradually been increasing.   
  
5. Hx A Fib- Cont Xarelto, Dose decreased to 15 mg which should likely be her dose at d/c due to CKD. Rate controlled, cont home regimen Coreg.   
  
6. Bed bound- x 2 months. Follow with PT/OT.  
  
7. DM 2: A1C is 8.2 with . Continue with SSI as per protocol while inpatient. 
 
 8. Lymphedema- Wound care or OT for wraps.  
  
DC planning- Consult SW.  Likely d/c back home with caretaker but place ppd incase STR indicated.  
 
  
DVT Prophylaxis:  Xarelto Signed By: GERARDO Stout September 8, 2018

## 2018-09-08 NOTE — PROGRESS NOTES
Problem: Mobility Impaired (Adult and Pediatric) Goal: *Acute Goals and Plan of Care (Insert Text) STG: 
(1.)Patient will roll side to side in bed with  MINIMAL ASSIST within 3-5 day(s). (2.)Patient will move from supine to sit and sit to supine  in bed with  MINIMAL ASSIST within 3-5 day(s). (3.)Patient will sit edge of bed with SUPERVISION  for 20 minutes while performing LE/trunk exercises within 3-5 day(s). 4.  Patient will perform sliding board transfer bed to chair with  MAXIMAL ASSIST within 3-5 day(s). LTG: 
(1.)Patient will move from supine to sit and sit to supine  and roll side to side in bed with  CONTACT GUARD ASSIST  within 7-10 day(s). (2.)Patient will transfer from bed to chair and chair to bed with  MODERATE ASSIST using the least restrictive device within 7-10 day(s). 4.  Patient will exhibit 3/5 strength B LE's to facilitate increased independence with bed mobility within 7-10 days. PHYSICAL THERAPY: Initial Assessment, Treatment Day: Day of Assessment, PM 9/8/2018 INPATIENT: Hospital Day: 2 Payor: Tay Tavera / Plan: [address] [place] [address] HMO / Product Type: [employer] Medicare /  
  
NAME/AGE/GENDER: Polo Nguyễn is a 80 y.o. female PRIMARY DIAGNOSIS: Hyponatremia Hyponatremia Hyponatremia ICD-10: Treatment Diagnosis:  
 · Generalized Muscle Weakness (M62.81) · Other abnormalities of gait and mobility (R26.89) Precaution/Allergies: 
Benazepril; Dynacirc [isradipine]; Peanut; and Morphine ASSESSMENT:  
 
Ms. Terry Sarmiento presents supine in bed with spouse at bedside. She was pleasant and agreeable for PT assessment. Per patient and spouse, she has been declining in functional mobility over past month to the point where she is practically bedbound. Prior to decline, patient was ambulatory with RW in home. Patient with generalized weakness and R LE much weaker than L LE. She required mod to max assist to transition to sit EOB.   LE's tender to touch so difficult to assist patient at times. Sitting balance good. Standing not attempted due to LE weakness-would require total assistance. Patient has declined in functional mobility, Ms. Cl Mcgovern would benefit from skilled physical therapy (medically necessary) to address her deficits and maximize her function. Initiated treatment to include LE exercises in sit and supine as listed below. Patient will need intensive rehab to increase functional mobility. This section established at most recent assessment PROBLEM LIST (Impairments causing functional limitations): 1. Decreased Strength 2. Decreased ADL/Functional Activities 3. Decreased Transfer Abilities 4. Decreased Ambulation Ability/Technique 5. Decreased Balance 6. Increased Pain 7. Decreased Activity Tolerance INTERVENTIONS PLANNED: (Benefits and precautions of physical therapy have been discussed with the patient.) 1. Balance Exercise 2. Bed Mobility 3. Gait Training 4. Therapeutic Activites 5. Therapeutic Exercise/Strengthening 6. Transfer Training 7. education 8. Group Therapy TREATMENT PLAN: Frequency/Duration: 3 times a week for duration of hospital stay Rehabilitation Potential For Stated Goals: Fair RECOMMENDED REHABILITATION/EQUIPMENT: (at time of discharge pending progress): Due to the probability of continued deficits (see above) this patient will likely need continued skilled physical therapy after discharge. Equipment:  
? None at this time HISTORY:  
History of Present Injury/Illness (Reason for Referral): 
Per MD note, \"79 yo female with pmh A Fib on Xarelto, CKD, DM 2, chronic systolic and diastolic HF who presented to ER this afternoon due to progressive weakness, fatigue, loss of appetite x 2 months, symptoms much worse in past few days. Pt found with Na 123, acute on chronic kidney disease, and UTI.  She was started on IV fluids and abx.  pt denies CP, SOB, abd pain, fevers, nausea/vomiting/diarrhea. Says she \"just didn't feel like eating\". She is bed bound with daily health aid in the home. \" 
Past Medical History/Comorbidities: Ms. Gee Larsen  has a past medical history of Acute gout involving toe of left foot (5/19/2017); CAD (coronary artery disease) (2007); Cardiomyopathy ([address] (11/25/2009); Chronic indwelling Harper catheter; Chronic kidney disease (2002); Chronic systolic heart failure ([address] (2007); Colon polyps (2016); CVA (cerebral vascular accident) [facility]); CVA (cerebral vascular accident) ([address] (8/8/2016); Deep vein thrombosis (DVT) of lower extremity ([address] (5/27/2016); Diabetes ([address] (2002); Diabetes mellitus ([address] (6/20/2012); Duodenal ulcer; Duodenal ulcer (11/18/2012); Duodenal ulcer (11/18/2012); Dyslipidemia (8/8/2016); Essential hypertension (10/26/2009); Heart failure ([address] Hypertension (2009); Hypoglycemia, unspecified (12/3/2012); JOÃO (iron deficiency anemia); Obesity, morbid (11/25/2009); Other ill-defined conditions(799.89) (2002); Persistent atrial fibrillation ([address] (10/26/2009); Pulmonary HTN ([address] SBO (small bowel obstruction) ([address] (8/8/2013); Sleep apnea (2010); Sleep apnea - noncompliant with CPAP (11/13/2012); Type II diabetes mellitus, uncontrolled ([address] (6/20/2012); Urinary tract infection (12/3/2012); UTI (lower urinary tract infection) (8/8/2013); and UTI (urinary tract infection) (11/23/2012). She also has no past medical history of Asthma; Autoimmune disease ([address] Cancer ([address] COPD; DEMENTIA; Liver disease; Psychiatric disorder; or Seizures ([facility] Ms. Gee Larsen  has a past surgical history that includes hx hysterectomy (>20 years ago); hx tubal ligation (>40 years ago); hx colonoscopy (2016); and colonoscopy (N/A, 6/20/2016). Social History/Living Environment:  
Home Environment: Private residence One/Two Story Residence: One story Living Alone: No 
Support Systems: Family member(s), Home care staff Patient Expects to be Discharged to[de-identified] Rehabilitation facility Current DME Used/Available at Home: Wheelchair Prior Level of Function/Work/Activity: 
Lives at home with spouse. Has declined from ambulating with RW to w/c mobility to bedbound recently. Number of Personal Factors/Comorbidities that affect the Plan of Care: 3+: HIGH COMPLEXITY EXAMINATION:  
Most Recent Physical Functioning:  
Gross Assessment: 
AROM: Generally decreased, functional (L LE) PROM: Generally decreased, functional (R LE) Strength: Grossly decreased, non-functional 
Sensation: Intact Posture: 
Posture (WDL): Exceptions to [facility] Posture Assessment: Forward head, Rounded shoulders Balance: 
Sitting: Intact Bed Mobility: 
Supine to Sit: Moderate assistance;Maximum assistance Sit to Supine: Moderate assistance;Assist x2;Maximum assistance Scooting: Total assistance Wheelchair Mobility: 
  
Transfers: 
  
Gait: 
  
   
  
Body Structures Involved: 1. Metabolic 2. Joints 3. Muscles Body Functions Affected: 1. Genitourinary 2. Movement Related 3. Metobolic/Endocrine Activities and Participation Affected: 1. Mobility 2. Self Care 3. Domestic Life Number of elements that affect the Plan of Care: 4+: HIGH COMPLEXITY CLINICAL PRESENTATION:  
Presentation: Evolving clinical presentation with unstable and unpredictable characteristics: HIGH COMPLEXITY CLINICAL DECISION MAKIN [facility] [address] [address] AM-PAC 6 Clicks Basic Mobility Inpatient Short Form How much difficulty does the patient currently have. .. Unable A Lot A Little None 1. Turning over in bed (including adjusting bedclothes, sheets and blankets)? [] 1   [x] 2   [] 3   [] 4  
2. Sitting down on and standing up from a chair with arms ( e.g., wheelchair, bedside commode, etc.)   [] 1   [x] 2   [] 3   [] 4  
3. Moving from lying on back to sitting on the side of the bed?    [] 1   [x] 2   [] 3   [] 4  
 How much help from another person does the patient currently need. .. Total A Lot A Little None 4. Moving to and from a bed to a chair (including a wheelchair)? [x] 1   [] 2   [] 3   [] 4  
5. Need to walk in hospital room? [x] 1   [] 2   [] 3   [] 4  
6. Climbing 3-5 steps with a railing? [x] 1   [] 2   [] 3   [] 4  
© 2007, Trustees of [address] [place] [address] [address] [address], under license to [employer]. All rights reserved Score:  Initial: 9 Most Recent: X (Date: -- ) Interpretation of Tool:  Represents activities that are increasingly more difficult (i.e. Bed mobility, Transfers, Gait). Score 24 23 22-20 19-15 14-10 9-7 6 Modifier CH CI CJ CK CL CM CN   
 
? Mobility - Walking and Moving Around:  
  - CURRENT STATUS: CM - 80%-99% impaired, limited or restricted  - GOAL STATUS: CL - 60%-79% impaired, limited or restricted  - D/C STATUS:  ---------------To be determined--------------- Payor: Ant Vaughan / Plan: [address] [place] [address] HMO / Product Type: Managed Care Medicare /   
 
Medical Necessity:    
· Patient is expected to demonstrate progress in strength, range of motion, balance, coordination and functional technique to decrease assistance required with all functional mobility. Reason for Services/Other Comments: 
· Patient continues to require skilled intervention due to recent decline in functional mobility. Use of outcome tool(s) and clinical judgement create a POC that gives a: Difficult prediction of patient's progress: HIGH COMPLEXITY  
  
 
 
 
TREATMENT:  
(In addition to Assessment/Re-Assessment sessions the following treatments were rendered) Pre-treatment Symptoms/Complaints:   
Pain: Initial:  
Pain Intensity 1: 0  Post Session:  None at rest.  
 
Therapeutic Exercise: (  23):  Exercises per grid below to improve strength and coordination. Required minimal visual, verbal and manual cues to promote proper body alignment.   Progressed complexity of movement as indicated. DATE: 9/8/18 Sitting EOB x10' Hip abduct/ adduct x10 AAB Heel slides  x10 AL, AAR Hip external/ internal rotation x10 AB Ankle dorsiflexion/ plantarflexion x10 AB Long arc quads x10 AB Key:  A=active, AA=active assisted, P=passive, B=bilaterally, R=right, L=left Braces/Orthotics/Lines/Etc:  
· IV 
· O2 Device: Room air Treatment/Session Assessment:   
· Response to Treatment:  Fatigued. · Interdisciplinary Collaboration:  
o Physical Therapist 
o Registered Nurse 
o Certified Nursing Assistant/Patient Care Technician · After treatment position/precautions:  
o Supine in bed 
o Bed/Chair-wheels locked 
o Call light within reach 
o RN notified 
o Family at bedside · Compliance with Program/Exercises: compliant all of the time. · Recommendations/Intent for next treatment session: \"Next visit will focus on advancements to more challenging activities and reduction in assistance provided\". Total Treatment Duration: PT Patient Time In/Time Out Time In: 8466 Time Out: 1425 Mari Barrett, PT, DPT

## 2018-09-09 LAB
ANION GAP SERPL CALC-SCNC: 11 MMOL/L (ref 7–16)
ANION GAP SERPL CALC-SCNC: 12 MMOL/L (ref 7–16)
BUN SERPL-MCNC: 118 MG/DL (ref 8–23)
BUN SERPL-MCNC: 128 MG/DL (ref 8–23)
CALCIUM SERPL-MCNC: 9.7 MG/DL (ref 8.3–10.4)
CALCIUM SERPL-MCNC: 9.9 MG/DL (ref 8.3–10.4)
CHLORIDE SERPL-SCNC: 89 MMOL/L (ref 98–107)
CHLORIDE SERPL-SCNC: 92 MMOL/L (ref 98–107)
CO2 SERPL-SCNC: 29 MMOL/L (ref 21–32)
CO2 SERPL-SCNC: 30 MMOL/L (ref 21–32)
CREAT SERPL-MCNC: 1.51 MG/DL (ref 0.6–1)
CREAT SERPL-MCNC: 1.7 MG/DL (ref 0.6–1)
GLUCOSE BLD STRIP.AUTO-MCNC: 157 MG/DL (ref 65–100)
GLUCOSE BLD STRIP.AUTO-MCNC: 189 MG/DL (ref 65–100)
GLUCOSE BLD STRIP.AUTO-MCNC: 317 MG/DL (ref 65–100)
GLUCOSE BLD STRIP.AUTO-MCNC: 344 MG/DL (ref 65–100)
GLUCOSE BLD STRIP.AUTO-MCNC: 364 MG/DL (ref 65–100)
GLUCOSE SERPL-MCNC: 326 MG/DL (ref 65–100)
GLUCOSE SERPL-MCNC: 346 MG/DL (ref 65–100)
MM INDURATION POC: NORMAL MM (ref 0–5)
POTASSIUM SERPL-SCNC: 3.3 MMOL/L (ref 3.5–5.1)
POTASSIUM SERPL-SCNC: 3.8 MMOL/L (ref 3.5–5.1)
PPD POC: NORMAL NEGATIVE
SODIUM SERPL-SCNC: 130 MMOL/L (ref 136–145)
SODIUM SERPL-SCNC: 133 MMOL/L (ref 136–145)

## 2018-09-09 PROCEDURE — 74011250637 HC RX REV CODE- 250/637: Performed by: INTERNAL MEDICINE

## 2018-09-09 PROCEDURE — 65270000029 HC RM PRIVATE

## 2018-09-09 PROCEDURE — 3331090002 HH PPS REVENUE DEBIT

## 2018-09-09 PROCEDURE — 74011636637 HC RX REV CODE- 636/637: Performed by: INTERNAL MEDICINE

## 2018-09-09 PROCEDURE — 82962 GLUCOSE BLOOD TEST: CPT

## 2018-09-09 PROCEDURE — 96366 THER/PROPH/DIAG IV INF ADDON: CPT

## 2018-09-09 PROCEDURE — 80048 BASIC METABOLIC PNL TOTAL CA: CPT

## 2018-09-09 PROCEDURE — 74011250636 HC RX REV CODE- 250/636: Performed by: INTERNAL MEDICINE

## 2018-09-09 PROCEDURE — 36415 COLL VENOUS BLD VENIPUNCTURE: CPT

## 2018-09-09 PROCEDURE — 65390000012 HC CONDITION CODE 44 OBSERVATION

## 2018-09-09 PROCEDURE — 74011000258 HC RX REV CODE- 258: Performed by: INTERNAL MEDICINE

## 2018-09-09 PROCEDURE — 96361 HYDRATE IV INFUSION ADD-ON: CPT

## 2018-09-09 PROCEDURE — 3331090001 HH PPS REVENUE CREDIT

## 2018-09-09 PROCEDURE — 77030020263 HC SOL INJ SOD CL0.9% LFCR 1000ML

## 2018-09-09 RX ORDER — NYSTATIN 100000 [USP'U]/G
POWDER TOPICAL 2 TIMES DAILY
Status: DISCONTINUED | OUTPATIENT
Start: 2018-09-09 | End: 2018-09-14 | Stop reason: HOSPADM

## 2018-09-09 RX ORDER — POTASSIUM CHLORIDE 20 MEQ/1
40 TABLET, EXTENDED RELEASE ORAL
Status: COMPLETED | OUTPATIENT
Start: 2018-09-09 | End: 2018-09-09

## 2018-09-09 RX ORDER — SODIUM CHLORIDE 9 MG/ML
75 INJECTION, SOLUTION INTRAVENOUS CONTINUOUS
Status: DISCONTINUED | OUTPATIENT
Start: 2018-09-09 | End: 2018-09-11

## 2018-09-09 RX ADMIN — RIVAROXABAN 15 MG: 15 TABLET, FILM COATED ORAL at 08:51

## 2018-09-09 RX ADMIN — POTASSIUM CHLORIDE 40 MEQ: 20 TABLET, EXTENDED RELEASE ORAL at 19:06

## 2018-09-09 RX ADMIN — SODIUM CHLORIDE 75 ML/HR: 900 INJECTION, SOLUTION INTRAVENOUS at 19:11

## 2018-09-09 RX ADMIN — NYSTATIN: 100000 POWDER TOPICAL at 13:39

## 2018-09-09 RX ADMIN — Medication 10 ML: at 05:43

## 2018-09-09 RX ADMIN — CARVEDILOL 12.5 MG: 12.5 TABLET, FILM COATED ORAL at 08:51

## 2018-09-09 RX ADMIN — CARVEDILOL 12.5 MG: 12.5 TABLET, FILM COATED ORAL at 19:08

## 2018-09-09 RX ADMIN — Medication 10 ML: at 13:34

## 2018-09-09 RX ADMIN — INSULIN LISPRO 8 UNITS: 100 INJECTION, SOLUTION INTRAVENOUS; SUBCUTANEOUS at 22:28

## 2018-09-09 RX ADMIN — ISOSORBIDE MONONITRATE 30 MG: 30 TABLET, EXTENDED RELEASE ORAL at 08:51

## 2018-09-09 RX ADMIN — NYSTATIN: 100000 POWDER TOPICAL at 19:08

## 2018-09-09 RX ADMIN — CEFTRIAXONE SODIUM 1 G: 1 INJECTION, POWDER, FOR SOLUTION INTRAMUSCULAR; INTRAVENOUS at 19:07

## 2018-09-09 RX ADMIN — INSULIN LISPRO 8 UNITS: 100 INJECTION, SOLUTION INTRAVENOUS; SUBCUTANEOUS at 19:07

## 2018-09-09 RX ADMIN — INSULIN LISPRO 2 UNITS: 100 INJECTION, SOLUTION INTRAVENOUS; SUBCUTANEOUS at 13:07

## 2018-09-09 RX ADMIN — ASPIRIN 81 MG 81 MG: 81 TABLET ORAL at 08:51

## 2018-09-09 RX ADMIN — Medication 10 ML: at 23:09

## 2018-09-09 NOTE — PROGRESS NOTES
Hospitalist Progress Note 2018 Admit Date: 2018 12:57 PM  
NAME: Don Kc :  1935 MRN:  [id number] Attending: Arianna Suarez DO 
PCP:  Juana Daily MD 
 
SUBJECTIVE:  
 
Pt is a 81 yo female with pmh A Fib on Xarelto, CKD, DM 2, chronic systolic and diastolic HF who presented to ER due  to progressive weakness, fatigue, loss of appetite x 2 months, symptoms much worse in past few days. Pt found with Na 123, acute on chronic kidney disease, and UTI. She was started on IV fluids and abx. Her Na improved to 129 but has remained stable x 8 hours. She has clinically improved and appears more alert. She reports that she was able to eat some breakfast, appetite some improved. Review of Systems negative with exception of pertinent positives noted above PHYSICAL EXAM  
 
Visit Vitals  /52  Pulse 76  Temp 97.2 °F (36.2 °C)  Resp 16  
 Ht 5' 6\" (1.676 m)  Wt 99.8 kg (220 lb)  SpO2 98%  BMI 35.51 kg/m2 Temp (24hrs), Av °F (36.7 °C), Min:97.2 °F (36.2 °C), Max:98.7 °F (37.1 °C) Oxygen Therapy O2 Sat (%): 98 % (18 0800) Pulse via Oximetry: 86 beats per minute (18 1329) O2 Device: Room air (18 1253) Intake/Output Summary (Last 24 hours) at 18 0950 Last data filed at 18 9731 Gross per 24 hour Intake              120 ml Output                0 ml Net              120 ml General: No acute distress, Weak     
Lungs:  CTA Bilaterally. Heart:  Regular rate and rhythm,  No murmur, rub, or gallop Abdomen: Soft, Non distended, Non tender, Positive bowel sounds Extremities: No cyanosis, clubbing. Chronic lymphedema Neurologic:  No focal deficits ASSESSMENT Active Hospital Problems Diagnosis Date Noted  Hyponatremia 2018  Risk for falls 2018   Pt had recent fall; no serious injury, but pt is high risk; referral sent to  to see if pt could get help getting set up for [employer].  Urinary tract infection 12/03/2012 Associated with chronic indwelling du catheter  Type II diabetes mellitus, uncontrolled ([facility] 06/20/2012 Last HA1c 7.0; check today. Continue with daily self foot exams and annual eye exams.  Persistent atrial fibrillation ([facility] 10/26/2009 A/P: 
   
Hyponatremia- Na up to 130 with IV fluids but stable x 8 hours without further improvement of Na. Likely r/t dehydration due to poor intake plus multiple diuetics, which remain on hold. Cont gentle IV fluids  If not will consult nephrology for assistance. UTI- Urine culture with GNR, cont Rocephin D 3. Chronic systolic and diastolic HF-  Diruetics on hold given SAMAN and dehydration. Will likely decrease dosing of diuretics on d/c. Acute on CKD- Cr trending back to baseline. Hx A Fib- Cont Xarelto, but decrease dose 15 mg for renal dosing. This should be adjusted at d/c.  
   
Bed bound- x 2 months. Consult PT,OT. DM 2- A1C 8.2. Cont humalog SSI achs while inpatient. Needs either oral DM med or insulin on d/c. Lymphedema- Consult wound care or OT for wraps. DC planning- Consult SW.  Likely d/c back home with caretaker. Possible home in 1-2 days when Na improved. DVT Prophylaxis:  Xarelto Signed By: Yaa Ryder NP September 9, 2018

## 2018-09-10 ENCOUNTER — HOME CARE VISIT (OUTPATIENT)
Dept: HOME HEALTH SERVICES | Facility: HOME HEALTH | Age: 83
End: 2018-09-10
Payer: MEDICARE

## 2018-09-10 LAB
ANION GAP SERPL CALC-SCNC: 10 MMOL/L (ref 7–16)
ANION GAP SERPL CALC-SCNC: 12 MMOL/L (ref 7–16)
ANION GAP SERPL CALC-SCNC: 13 MMOL/L (ref 7–16)
BACTERIA SPEC CULT: ABNORMAL
BUN SERPL-MCNC: 104 MG/DL (ref 8–23)
BUN SERPL-MCNC: 108 MG/DL (ref 8–23)
BUN SERPL-MCNC: 93 MG/DL (ref 8–23)
CALCIUM SERPL-MCNC: 10 MG/DL (ref 8.3–10.4)
CALCIUM SERPL-MCNC: 10.4 MG/DL (ref 8.3–10.4)
CALCIUM SERPL-MCNC: 9.9 MG/DL (ref 8.3–10.4)
CHLORIDE SERPL-SCNC: 94 MMOL/L (ref 98–107)
CHLORIDE SERPL-SCNC: 94 MMOL/L (ref 98–107)
CHLORIDE SERPL-SCNC: 95 MMOL/L (ref 98–107)
CO2 SERPL-SCNC: 28 MMOL/L (ref 21–32)
CO2 SERPL-SCNC: 28 MMOL/L (ref 21–32)
CO2 SERPL-SCNC: 29 MMOL/L (ref 21–32)
CREAT SERPL-MCNC: 1.28 MG/DL (ref 0.6–1)
CREAT SERPL-MCNC: 1.32 MG/DL (ref 0.6–1)
CREAT SERPL-MCNC: 1.39 MG/DL (ref 0.6–1)
GLUCOSE BLD STRIP.AUTO-MCNC: 222 MG/DL (ref 65–100)
GLUCOSE BLD STRIP.AUTO-MCNC: 297 MG/DL (ref 65–100)
GLUCOSE BLD STRIP.AUTO-MCNC: 392 MG/DL (ref 65–100)
GLUCOSE SERPL-MCNC: 237 MG/DL (ref 65–100)
GLUCOSE SERPL-MCNC: 313 MG/DL (ref 65–100)
GLUCOSE SERPL-MCNC: 317 MG/DL (ref 65–100)
POTASSIUM SERPL-SCNC: 3.5 MMOL/L (ref 3.5–5.1)
POTASSIUM SERPL-SCNC: 3.7 MMOL/L (ref 3.5–5.1)
POTASSIUM SERPL-SCNC: 3.9 MMOL/L (ref 3.5–5.1)
SERVICE CMNT-IMP: ABNORMAL
SODIUM SERPL-SCNC: 134 MMOL/L (ref 136–145)
SODIUM SERPL-SCNC: 134 MMOL/L (ref 136–145)
SODIUM SERPL-SCNC: 135 MMOL/L (ref 136–145)

## 2018-09-10 PROCEDURE — 3331090001 HH PPS REVENUE CREDIT

## 2018-09-10 PROCEDURE — 65390000012 HC CONDITION CODE 44 OBSERVATION

## 2018-09-10 PROCEDURE — 74011250636 HC RX REV CODE- 250/636: Performed by: INTERNAL MEDICINE

## 2018-09-10 PROCEDURE — 74011636637 HC RX REV CODE- 636/637: Performed by: INTERNAL MEDICINE

## 2018-09-10 PROCEDURE — 74011250637 HC RX REV CODE- 250/637: Performed by: INTERNAL MEDICINE

## 2018-09-10 PROCEDURE — 82962 GLUCOSE BLOOD TEST: CPT

## 2018-09-10 PROCEDURE — 96366 THER/PROPH/DIAG IV INF ADDON: CPT

## 2018-09-10 PROCEDURE — 36415 COLL VENOUS BLD VENIPUNCTURE: CPT

## 2018-09-10 PROCEDURE — 80048 BASIC METABOLIC PNL TOTAL CA: CPT

## 2018-09-10 PROCEDURE — 77030020263 HC SOL INJ SOD CL0.9% LFCR 1000ML

## 2018-09-10 PROCEDURE — 3331090002 HH PPS REVENUE DEBIT

## 2018-09-10 PROCEDURE — 96361 HYDRATE IV INFUSION ADD-ON: CPT

## 2018-09-10 PROCEDURE — 65270000029 HC RM PRIVATE

## 2018-09-10 PROCEDURE — 77030020255 HC SOL INJ LR 1000ML BG

## 2018-09-10 PROCEDURE — 74011000258 HC RX REV CODE- 258: Performed by: INTERNAL MEDICINE

## 2018-09-10 RX ADMIN — INSULIN LISPRO 10 UNITS: 100 INJECTION, SOLUTION INTRAVENOUS; SUBCUTANEOUS at 20:03

## 2018-09-10 RX ADMIN — CEFTRIAXONE SODIUM 1 G: 1 INJECTION, POWDER, FOR SOLUTION INTRAMUSCULAR; INTRAVENOUS at 15:43

## 2018-09-10 RX ADMIN — ISOSORBIDE MONONITRATE 30 MG: 30 TABLET, EXTENDED RELEASE ORAL at 09:19

## 2018-09-10 RX ADMIN — Medication 10 ML: at 15:44

## 2018-09-10 RX ADMIN — NYSTATIN: 100000 POWDER TOPICAL at 09:13

## 2018-09-10 RX ADMIN — INSULIN LISPRO 8 UNITS: 100 INJECTION, SOLUTION INTRAVENOUS; SUBCUTANEOUS at 22:45

## 2018-09-10 RX ADMIN — ASPIRIN 81 MG 81 MG: 81 TABLET ORAL at 09:00

## 2018-09-10 RX ADMIN — Medication 10 ML: at 06:07

## 2018-09-10 RX ADMIN — INSULIN LISPRO 4 UNITS: 100 INJECTION, SOLUTION INTRAVENOUS; SUBCUTANEOUS at 09:14

## 2018-09-10 RX ADMIN — RIVAROXABAN 15 MG: 15 TABLET, FILM COATED ORAL at 09:13

## 2018-09-10 RX ADMIN — CARVEDILOL 12.5 MG: 12.5 TABLET, FILM COATED ORAL at 09:13

## 2018-09-10 RX ADMIN — SODIUM CHLORIDE 75 ML/HR: 900 INJECTION, SOLUTION INTRAVENOUS at 22:28

## 2018-09-10 RX ADMIN — NYSTATIN: 100000 POWDER TOPICAL at 18:00

## 2018-09-10 RX ADMIN — CARVEDILOL 12.5 MG: 12.5 TABLET, FILM COATED ORAL at 18:01

## 2018-09-10 NOTE — PROGRESS NOTES
Visit with patient to build rapport with . Patient is calm. Receptive to  presence. Encouraged and assured patient of our continued care. Carmen Trimble,  Staff  C: [phone]  /  [email or web address]

## 2018-09-10 NOTE — WOUND CARE
Consulted for lymphedema wraps. Wrote orders for lymphedema specialist to evaluate. Discussed with patient. No open wounds noted to legs.

## 2018-09-10 NOTE — PROGRESS NOTES
Interdisciplinary Rounds completed 09/10/18. Nursing, Case Management, Physician and PT present. Plan of care reviewed and updated. Pt now wanting to go home with home health

## 2018-09-10 NOTE — PROGRESS NOTES
Progress Note 9/10/2018 Admit Date: 2018 12:57 PM  
NAME: Rosanne Simon :  1935 MRN:  [id number] Attending: Genet Galvez DO 
PCP:  Nadiya Dubose MD 
Treatment Team: Attending Provider: Genet Galvez DO; Utilization Review: Fabian Harvey RN; Care Manager: Dandy Velarde RN; Student Nurse: Nate Hope Full Code SUBJECTIVE:  
Ms. Mehul Jaquez is a 81 yo female with PMH of afib on xarelto, CKD, DM 2, chronic systolic and diastolic HF (last echo 8335) who presented with c/o progressive weakness, fatigue, poor appetite X2 months. She was found to be hyponatremic with Na 123, SAMAN on CKD with creatinine 2.25 (baseline around 1.5) and UA consistent with UTI. Urine cx with klebsiella pneumoniae. Pt stared on rocephin. Na today 135. Creatinine improved at 1.32 today. She was ready for DC today however PT reports pt is max/mod assist and pt would benefit from STR. Pt agrees to STR. Pt denies CP, SOB, n/v/d. Past Medical History:  
Diagnosis Date  Acute gout involving toe of left foot 2017  CAD (coronary artery disease)  \"irregular heart beat\"  Cardiomyopathy ([address] 2009  Chronic indwelling Harper catheter  Chronic kidney disease 2002  
 on HD since 2012  Chronic systolic heart failure ([address] 2007  Colon polyps   CVA (cerebral vascular accident) ([address]  CVA (cerebral vascular accident) ([address] 2016  Deep vein thrombosis (DVT) of lower extremity ([address] 2016  Diabetes ([address]   Diabetes mellitus ([address] 2012  Duodenal ulcer  Duodenal ulcer 2012  Duodenal ulcer 2012  Dyslipidemia 2016  Essential hypertension 10/26/2009  
 Heart failure ([address]  Hypertension 2009  Hypoglycemia, unspecified 12/3/2012  JOÃO (iron deficiency anemia)  Obesity, morbid 2009  Other ill-defined conditions(799.89)  Lymphedema  Persistent atrial fibrillation ([facility] 10/26/2009  Pulmonary HTN ([facility]  SBO (small bowel obstruction) ([facility] 8/8/2013  Sleep apnea 2010  
 non-compliant with CPAP  Sleep apnea - noncompliant with CPAP 11/13/2012  Type II diabetes mellitus, uncontrolled ([facility] 6/20/2012  Urinary tract infection 12/3/2012  UTI (lower urinary tract infection) 8/8/2013  UTI (urinary tract infection) 11/23/2012 Recent Results (from the past 24 hour(s)) METABOLIC PANEL, BASIC Collection Time: 09/09/18  3:46 PM  
Result Value Ref Range Sodium 130 (L) 136 - 145 mmol/L Potassium 3.3 (L) 3.5 - 5.1 mmol/L Chloride 89 (L) 98 - 107 mmol/L  
 CO2 30 21 - 32 mmol/L Anion gap 11 7 - 16 mmol/L Glucose 346 (H) 65 - 100 mg/dL  (H) 8 - 23 MG/DL Creatinine 1.70 (H) 0.6 - 1.0 MG/DL  
 GFR est AA 37 (L) >60 ml/min/1.73m2 GFR est non-AA 31 (L) >60 ml/min/1.73m2 Calcium 9.9 8.3 - 10.4 MG/DL  
GLUCOSE, POC Collection Time: 09/09/18  4:07 PM  
Result Value Ref Range Glucose (POC) 344 (H) 65 - 100 mg/dL PLEASE READ & DOCUMENT PPD TEST IN 48 HRS Collection Time: 09/09/18  7:09 PM  
Result Value Ref Range PPD  Negative  
 mm Induration  0 mm GLUCOSE, POC Collection Time: 09/09/18  9:18 PM  
Result Value Ref Range Glucose (POC) 364 (H) 65 - 100 mg/dL METABOLIC PANEL, BASIC Collection Time: 09/09/18 10:31 PM  
Result Value Ref Range Sodium 133 (L) 136 - 145 mmol/L Potassium 3.8 3.5 - 5.1 mmol/L Chloride 92 (L) 98 - 107 mmol/L  
 CO2 29 21 - 32 mmol/L Anion gap 12 7 - 16 mmol/L Glucose 326 (H) 65 - 100 mg/dL  (H) 8 - 23 MG/DL Creatinine 1.51 (H) 0.6 - 1.0 MG/DL  
 GFR est AA 42 (L) >60 ml/min/1.73m2 GFR est non-AA 35 (L) >60 ml/min/1.73m2 Calcium 9.7 8.3 - 10.4 MG/DL  
GLUCOSE, POC Collection Time: 09/09/18 10:39 PM  
Result Value Ref Range Glucose (POC) 317 (H) 65 - 100 mg/dL METABOLIC PANEL, BASIC  
 Collection Time: 09/10/18  5:49 AM  
Result Value Ref Range Sodium 135 (L) 136 - 145 mmol/L Potassium 3.5 3.5 - 5.1 mmol/L Chloride 94 (L) 98 - 107 mmol/L  
 CO2 28 21 - 32 mmol/L Anion gap 13 7 - 16 mmol/L Glucose 237 (H) 65 - 100 mg/dL  (H) 8 - 23 MG/DL Creatinine 1.32 (H) 0.6 - 1.0 MG/DL  
 GFR est AA 50 (L) >60 ml/min/1.73m2 GFR est non-AA 41 (L) >60 ml/min/1.73m2 Calcium 10.4 8.3 - 10.4 MG/DL  
GLUCOSE, POC Collection Time: 09/10/18  7:34 AM  
Result Value Ref Range Glucose (POC) 222 (H) 65 - 100 mg/dL GLUCOSE, POC Collection Time: 09/10/18 11:38 AM  
Result Value Ref Range Glucose (POC) 297 (H) 65 - 100 mg/dL METABOLIC PANEL, BASIC Collection Time: 09/10/18 12:49 PM  
Result Value Ref Range Sodium 134 (L) 136 - 145 mmol/L Potassium 3.9 3.5 - 5.1 mmol/L Chloride 94 (L) 98 - 107 mmol/L  
 CO2 28 21 - 32 mmol/L Anion gap 12 7 - 16 mmol/L Glucose 317 (H) 65 - 100 mg/dL  (H) 8 - 23 MG/DL Creatinine 1.39 (H) 0.6 - 1.0 MG/DL  
 GFR est AA 47 (L) >60 ml/min/1.73m2 GFR est non-AA 39 (L) >60 ml/min/1.73m2 Calcium 10.0 8.3 - 10.4 MG/DL Allergies Allergen Reactions  Benazepril Angioedema Tongue swelling  Dynacirc [Isradipine] Angioedema Tongue swelling  Peanut Angioedema Tongue swelling  Morphine Other (comments) Unknown rxn, \"morphine just does not agree with me\" Current Facility-Administered Medications Medication Dose Route Frequency Provider Last Rate Last Dose  nystatin (MYCOSTATIN) 100,000 unit/gram powder   Topical BID Chris Godfrey DO      
 0.9% sodium chloride infusion  75 mL/hr IntraVENous CONTINUOUS Alpha MD Ángel 75 mL/hr at 09/09/18 1911 75 mL/hr at 09/09/18 1911  aspirin chewable tablet 81 mg  81 mg Oral DAILY Chris Godfrey DO   81 mg at 09/10/18 0900  carvedilol (COREG) tablet 12.5 mg  12.5 mg Oral BID WITH MEALS Joe Perez Mckeon, DO   12.5 mg at 09/10/18 5855  isosorbide mononitrate ER (IMDUR) tablet 30 mg  30 mg Oral DAILY Lombardo Fu, DO   30 mg at 09/10/18 0919  
 sodium chloride (NS) flush 5-10 mL  5-10 mL IntraVENous Q8H Lombardo Fu, DO   10 mL at 09/10/18 0726  sodium chloride (NS) flush 5-10 mL  5-10 mL IntraVENous PRN Lombardo Fu, DO      
 acetaminophen (TYLENOL) tablet 650 mg  650 mg Oral Q4H PRN Lombardo Fu, DO      
 ondansetron [facility]) injection 4 mg  4 mg IntraVENous Q4H PRN Lombardo Fu, DO      
 cefTRIAXone (ROCEPHIN) 1 g in 0.9% sodium chloride (MBP/ADV) 50 mL  1 g IntraVENous Q24H Lombardo Fu,  mL/hr at 18 1907 1 g at 18 1907  insulin lispro (HUMALOG) injection   SubCUTAneous AC&HS Lombardo Fu, DO   4 Units at 09/10/18 3528  rivaroxaban (XARELTO) tablet 15 mg  15 mg Oral DAILY WITH BREAKFAST Lombardo Fu, DO   15 mg at 09/10/18 9204  traMADol (ULTRAM) tablet 50 mg  50 mg Oral Q6H PRN Lombardo Fu, DO      
 influenza vaccine - (6 mos+)(PF) (FLUARIX QUAD/FLULAVAL QUAD) injection 0.5 mL  0.5 mL IntraMUSCular PRIOR TO DISCHARGE Lombardo Fu, DO Review of Systems negative with exception of pertinent positives noted above PHYSICAL EXAM  
 
Visit Vitals  /60 (BP 1 Location: Right arm, BP Patient Position: At rest)  Pulse 79  Temp 98.5 °F (36.9 °C)  Resp 18  Ht 5' 6\" (1.676 m)  Wt 99.8 kg (220 lb)  SpO2 97%  BMI 35.51 kg/m2 Temp (24hrs), Av.5 °F (36.9 °C), Min:98.1 °F (36.7 °C), Max:98.8 °F (37.1 °C) Oxygen Therapy O2 Sat (%): 97 % (09/10/18 1139) Pulse via Oximetry: 86 beats per minute (18 1329) O2 Device: Room air (18 1253) Intake/Output Summary (Last 24 hours) at 09/10/18 1530 Last data filed at 09/10/18 0300 Gross per 24 hour Intake                0 ml Output              800 ml Net             -800 ml General: No acute distress   
 Lungs: CTA bilaterally Heart:  S1S2 present without murmurs rubs gallops. No edema. RRR Abdomen: Soft non tender, non distended. BS present. Extremities: Moves ext spontaneously. No cyanosis. Neurologic:  A/O X4. No focal deficits Results summary of Diagnostic Studies/Procedures copied from within [facility] EMR: 
 
 
 
ASSESSMENT Active Hospital Problems Diagnosis Date Noted  Hyponatremia 09/07/2018  Risk for falls 04/11/2018 Pt had recent fall; no serious injury, but pt is high risk; referral sent to  to see if pt could get help getting set up for [employer].  Urinary tract infection 12/03/2012 Associated with chronic indwelling du catheter  Type II diabetes mellitus, uncontrolled ([facility] 06/20/2012 Last HA1c 7.0; check today. Continue with daily self foot exams and annual eye exams.  Persistent atrial fibrillation ([facility] 10/26/2009 Plan: Hyponatremia:  Resolved. Likely secondary to poor PO intake, dehydration. BMP in AM 
 
UTI:  Urine cx with klebsiella. Continue rocephin D 4 Chronic systolic/diastolic HF:  Holding diuretics given SAMAN, hyponatremia. Will check echo, last echo on file 2015. Hx afib:  Continue xarelto at renal dose. Will need dose adjusted on DC med list 
 
Bed bound X2 months:  PT reports max/mod assist.  Will need STR on DC 
 
DM 2:  A1C 8.2. Continue SSI. Will need treatment on DC. Not on home regimen prior to admission. Lymphedema:  OT for wraps Notes, labs, VS, diagnostic testing reviewed Time spent with pt 20 min DVT Prophylaxis: xarelto Plan of Care Discussed with: Supervising MD Dr. Pola Murdock, VONNIE

## 2018-09-10 NOTE — PROGRESS NOTES
[facility] [place] - INPATIENT Face to Face Encounter Patients Name: Crista Lozoya    YOB: 1935 Ordering Physician: Dr. Shelia Ruiz Primary Diagnosis: Hyponatremia Date of Face to Face:   9/10/2018 Face to Face Encounter findings are related to primary reason for home care:   yes. 1. I certify that the patient needs intermittent care as follows: physical therapy: strengthening, stretching/ROM, transfer training, gait/stair training, balance training and pt/caregiver education 
occupational therapy:  ADL safety (ie. cooking, bathing, dressing), ROM and pt/caregiver education 2. I certify that this patient is homebound, that is: 1) patient requires the use of a walker device, special transportation, or assistance of another to leave the home; or 2) patient's condition makes leaving the home medically contraindicated; and 3) patient has a normal inability to leave the home and leaving the home requires considerable and taxing effort. Patient may leave the home for infrequent and short duration for medical reasons, and occasional absences for non-medical reasons. Homebound status is due to the following functional limitations: Patient with strength deficits limiting the performance of all ADL's without caregiver assistance or the use of an assistive device. 3. I certify that this patient is under my care and that I, or a nurse practitioner or [phone], or clinical nurse specialist, or certified nurse midwife, working with me, had a Face-to-Face Encounter that meets the physician Face-to-Face Encounter requirements. The following are the clinical findings from the [address] [place] [address] encounter that support the need for skilled services and is a summary of the encounter: see hospital chart See summary of the patient's illness Mario Luna LMSW 
9/10/2018 THE FOLLOWING TO BE COMPLETED BY THE COMMUNITY PHYSICIAN: 
 
 I concur with the findings described above from the F2F encounter that this patient is homebound and in need of a skilled service. Certifying Physician: _____________________________________ Printed Certifying Physician Name: _____________________________________ Date: _________________

## 2018-09-10 NOTE — PROGRESS NOTES
CM met pt at bedside. PT lives with spouse. No steps in the home. DME in the home includes a walker, WC and BSC. No home oxygen. Pt reports being independent with ADLS-drives some but usually son transports pt to all necessary appts. Pt agreeable to [facility]. Referral sent to [facility] for PT/OT. Pt also has weekly caregiver that comes 2X weekly for 1-2 hours per visit. PCP Hope Newman) and insurance verified. Best times for appts: Thursday mornings. [phone]. No other needs voiced at this time. Care Management Interventions PCP Verified by CM: Yes Transition of Care Consult (CM Consult): Discharge Planning Physical Therapy Consult: Yes Occupational Therapy Consult: Yes Current Support Network: Lives with Spouse Confirm Follow Up Transport: Family Plan discussed with Pt/Family/Caregiver: Yes Freedom of Choice Offered: Yes Discharge Location Discharge Placement: Home with home health

## 2018-09-10 NOTE — PROGRESS NOTES
Pt has now decided on STR. Facilities list provided. Cm will follow for selections and send referrals. [facility] notified, as pt originally selected home with [place].

## 2018-09-10 NOTE — PROGRESS NOTES
OT LYMPHEDEMA NOTE: 
OT orders for BLE lymphedema wraps received, chart reviewed, discussed with patient. Per patient and daughter, patient has been bed bound for the past few weeks and has not had any issues with LE edema recently. Upon my assessment, no pitting edema noted, so do not feel that patient would benefit from any LE wraps at this time. Will sign off but remain available if needed.   
Thank you for this consult,  
Delonte Ledbetter, OTR/L, CLT

## 2018-09-11 LAB
ANION GAP SERPL CALC-SCNC: 10 MMOL/L (ref 7–16)
BUN SERPL-MCNC: 85 MG/DL (ref 8–23)
CALCIUM SERPL-MCNC: 10.4 MG/DL (ref 8.3–10.4)
CHLORIDE SERPL-SCNC: 100 MMOL/L (ref 98–107)
CO2 SERPL-SCNC: 28 MMOL/L (ref 21–32)
CREAT SERPL-MCNC: 1.1 MG/DL (ref 0.6–1)
GLUCOSE BLD STRIP.AUTO-MCNC: 116 MG/DL (ref 65–100)
GLUCOSE BLD STRIP.AUTO-MCNC: 205 MG/DL (ref 65–100)
GLUCOSE BLD STRIP.AUTO-MCNC: 263 MG/DL (ref 65–100)
GLUCOSE BLD STRIP.AUTO-MCNC: 266 MG/DL (ref 65–100)
GLUCOSE SERPL-MCNC: 97 MG/DL (ref 65–100)
POTASSIUM SERPL-SCNC: 3.8 MMOL/L (ref 3.5–5.1)
SODIUM SERPL-SCNC: 138 MMOL/L (ref 136–145)

## 2018-09-11 PROCEDURE — 74011250637 HC RX REV CODE- 250/637: Performed by: NURSE PRACTITIONER

## 2018-09-11 PROCEDURE — 74011250636 HC RX REV CODE- 250/636: Performed by: INTERNAL MEDICINE

## 2018-09-11 PROCEDURE — 65270000029 HC RM PRIVATE

## 2018-09-11 PROCEDURE — 80048 BASIC METABOLIC PNL TOTAL CA: CPT

## 2018-09-11 PROCEDURE — 74011250637 HC RX REV CODE- 250/637: Performed by: INTERNAL MEDICINE

## 2018-09-11 PROCEDURE — 3331090002 HH PPS REVENUE DEBIT

## 2018-09-11 PROCEDURE — 36415 COLL VENOUS BLD VENIPUNCTURE: CPT

## 2018-09-11 PROCEDURE — 77030020263 HC SOL INJ SOD CL0.9% LFCR 1000ML

## 2018-09-11 PROCEDURE — 74011636637 HC RX REV CODE- 636/637: Performed by: INTERNAL MEDICINE

## 2018-09-11 PROCEDURE — C8929 TTE W OR WO FOL WCON,DOPPLER: HCPCS

## 2018-09-11 PROCEDURE — 82962 GLUCOSE BLOOD TEST: CPT

## 2018-09-11 PROCEDURE — 97530 THERAPEUTIC ACTIVITIES: CPT

## 2018-09-11 PROCEDURE — 3331090001 HH PPS REVENUE CREDIT

## 2018-09-11 PROCEDURE — 74011000250 HC RX REV CODE- 250: Performed by: INTERNAL MEDICINE

## 2018-09-11 PROCEDURE — 65390000012 HC CONDITION CODE 44 OBSERVATION

## 2018-09-11 RX ORDER — CEFPODOXIME PROXETIL 200 MG/1
200 TABLET, FILM COATED ORAL EVERY 12 HOURS
Status: COMPLETED | OUTPATIENT
Start: 2018-09-11 | End: 2018-09-13

## 2018-09-11 RX ADMIN — Medication 10 ML: at 22:36

## 2018-09-11 RX ADMIN — Medication 10 ML: at 17:55

## 2018-09-11 RX ADMIN — INSULIN LISPRO 8 UNITS: 100 INJECTION, SOLUTION INTRAVENOUS; SUBCUTANEOUS at 17:53

## 2018-09-11 RX ADMIN — INSULIN LISPRO 4 UNITS: 100 INJECTION, SOLUTION INTRAVENOUS; SUBCUTANEOUS at 12:52

## 2018-09-11 RX ADMIN — CARVEDILOL 12.5 MG: 12.5 TABLET, FILM COATED ORAL at 17:53

## 2018-09-11 RX ADMIN — ASPIRIN 81 MG 81 MG: 81 TABLET ORAL at 09:04

## 2018-09-11 RX ADMIN — CEFPODOXIME PROXETIL 200 MG: 200 TABLET, FILM COATED ORAL at 22:31

## 2018-09-11 RX ADMIN — NYSTATIN: 100000 POWDER TOPICAL at 18:00

## 2018-09-11 RX ADMIN — RIVAROXABAN 15 MG: 15 TABLET, FILM COATED ORAL at 09:03

## 2018-09-11 RX ADMIN — CARVEDILOL 12.5 MG: 12.5 TABLET, FILM COATED ORAL at 09:03

## 2018-09-11 RX ADMIN — ISOSORBIDE MONONITRATE 30 MG: 30 TABLET, EXTENDED RELEASE ORAL at 09:04

## 2018-09-11 RX ADMIN — INSULIN LISPRO 6 UNITS: 100 INJECTION, SOLUTION INTRAVENOUS; SUBCUTANEOUS at 22:32

## 2018-09-11 RX ADMIN — PERFLUTREN 1 ML: 6.52 INJECTION, SUSPENSION INTRAVENOUS at 11:00

## 2018-09-11 RX ADMIN — CEFPODOXIME PROXETIL 200 MG: 200 TABLET, FILM COATED ORAL at 12:52

## 2018-09-11 RX ADMIN — NYSTATIN: 100000 POWDER TOPICAL at 09:00

## 2018-09-11 NOTE — PROGRESS NOTES
Progress Note 2018 Admit Date: 2018 12:57 PM  
NAME: Cipriano Garrett :  1935 MRN:  [id number] Attending: Jerri Darnell DO 
PCP:  Lynette Napier MD 
Treatment Team: Attending Provider: Jerri Darnell DO; Care Manager: Kristina Shepard RN; Student Nurse: Miquel Ortega Full Code SUBJECTIVE:  
Ms. Sridevi Barnes is a 81 yo female with PMH of afib on xarelto, CKD, DM 2, chronic systolic and diastolic HF (last echo 8634) who presented with c/o progressive weakness, fatigue, poor appetite X2 months. She was found to be hyponatremic with Na 123, SAMAN on CKD with creatinine 2.25 (baseline around 1.5) and UA consistent with UTI. Urine cx with klebsiella pneumoniae. Pt stared on rocephin. Na today 138. Creatinine improved at 1.10 today. Echo ordered, last echo  for evaluated of CHF. Awaiting STR placement. Pt denies CP, SOB, n/v/d. Past Medical History:  
Diagnosis Date  Acute gout involving toe of left foot 2017  CAD (coronary artery disease)  \"irregular heart beat\"  Cardiomyopathy ([address] 2009  Chronic indwelling Harper catheter  Chronic kidney disease   
 on HD since 2012  Chronic systolic heart failure ([address] 2007  Colon polyps   CVA (cerebral vascular accident) ([address]  CVA (cerebral vascular accident) ([address] 2016  Deep vein thrombosis (DVT) of lower extremity ([address] 2016  Diabetes ([address] 2002  Diabetes mellitus ([address] 2012  Duodenal ulcer  Duodenal ulcer 2012  Duodenal ulcer 2012  Dyslipidemia 2016  Essential hypertension 10/26/2009  
 Heart failure ([address]  Hypertension 2009  Hypoglycemia, unspecified 12/3/2012  JOÃO (iron deficiency anemia)  Obesity, morbid 2009  Other ill-defined conditions(799.89)  Lymphedema  Persistent atrial fibrillation ([address] 10/26/2009  Pulmonary HTN ([address]  SBO (small bowel obstruction) ([address] 2013  Sleep apnea 2010  
 non-compliant with CPAP  Sleep apnea - noncompliant with CPAP 11/13/2012  Type II diabetes mellitus, uncontrolled ([facility] 6/20/2012  Urinary tract infection 12/3/2012  UTI (lower urinary tract infection) 8/8/2013  UTI (urinary tract infection) 11/23/2012 Recent Results (from the past 24 hour(s)) GLUCOSE, POC Collection Time: 09/10/18 11:38 AM  
Result Value Ref Range Glucose (POC) 297 (H) 65 - 100 mg/dL METABOLIC PANEL, BASIC Collection Time: 09/10/18 12:49 PM  
Result Value Ref Range Sodium 134 (L) 136 - 145 mmol/L Potassium 3.9 3.5 - 5.1 mmol/L Chloride 94 (L) 98 - 107 mmol/L  
 CO2 28 21 - 32 mmol/L Anion gap 12 7 - 16 mmol/L Glucose 317 (H) 65 - 100 mg/dL  (H) 8 - 23 MG/DL Creatinine 1.39 (H) 0.6 - 1.0 MG/DL  
 GFR est AA 47 (L) >60 ml/min/1.73m2 GFR est non-AA 39 (L) >60 ml/min/1.73m2 Calcium 10.0 8.3 - 10.4 MG/DL  
GLUCOSE, POC Collection Time: 09/10/18  7:25 PM  
Result Value Ref Range Glucose (POC) 392 (H) 65 - 100 mg/dL METABOLIC PANEL, BASIC Collection Time: 09/10/18  9:51 PM  
Result Value Ref Range Sodium 134 (L) 136 - 145 mmol/L Potassium 3.7 3.5 - 5.1 mmol/L Chloride 95 (L) 98 - 107 mmol/L  
 CO2 29 21 - 32 mmol/L Anion gap 10 7 - 16 mmol/L Glucose 313 (H) 65 - 100 mg/dL BUN 93 (H) 8 - 23 MG/DL Creatinine 1.28 (H) 0.6 - 1.0 MG/DL  
 GFR est AA 51 (L) >60 ml/min/1.73m2 GFR est non-AA 42 (L) >60 ml/min/1.73m2 Calcium 9.9 8.3 - 00.4 MG/DL  
METABOLIC PANEL, BASIC Collection Time: 09/11/18  4:54 AM  
Result Value Ref Range Sodium 138 136 - 145 mmol/L Potassium 3.8 3.5 - 5.1 mmol/L Chloride 100 98 - 107 mmol/L  
 CO2 28 21 - 32 mmol/L Anion gap 10 7 - 16 mmol/L Glucose 97 65 - 100 mg/dL BUN 85 (H) 8 - 23 MG/DL Creatinine 1.10 (H) 0.6 - 1.0 MG/DL  
 GFR est AA >60 >60 ml/min/1.73m2 GFR est non-AA 51 (L) >60 ml/min/1.73m2  Calcium 10.4 8.3 - 10.4 MG/DL  
 GLUCOSE, POC Collection Time: 09/11/18  7:14 AM  
Result Value Ref Range Glucose (POC) 116 (H) 65 - 100 mg/dL Allergies Allergen Reactions  Benazepril Angioedema Tongue swelling  Dynacirc [Isradipine] Angioedema Tongue swelling  Peanut Angioedema Tongue swelling  Morphine Other (comments) Unknown rxn, \"morphine just does not agree with me\" Current Facility-Administered Medications Medication Dose Route Frequency Provider Last Rate Last Dose  nystatin (MYCOSTATIN) 100,000 unit/gram powder   Topical BID Jeny Muscat, DO      
 aspirin chewable tablet 81 mg  81 mg Oral DAILY Jeny Muscat, DO   81 mg at 09/11/18 0702  carvedilol (COREG) tablet 12.5 mg  12.5 mg Oral BID WITH MEALS Shae Everton Mckeon, DO   12.5 mg at 09/11/18 3352  isosorbide mononitrate ER (IMDUR) tablet 30 mg  30 mg Oral DAILY Jeny Muscat, DO   30 mg at 09/11/18 2180  sodium chloride (NS) flush 5-10 mL  5-10 mL IntraVENous Q8H Eva Cliftonson, DO   10 mL at 09/10/18 1544  sodium chloride (NS) flush 5-10 mL  5-10 mL IntraVENous PRN Jeny Muscat, DO      
 acetaminophen (TYLENOL) tablet 650 mg  650 mg Oral Q4H PRN Jeny Muscat, DO      
 [facility]) injection 4 mg  4 mg IntraVENous Q4H PRN Jeny Muscat, DO      
 cefTRIAXone (ROCEPHIN) 1 g in 0.9% sodium chloride (MBP/ADV) 50 mL  1 g IntraVENous Q24H Jeny Muscat,  mL/hr at 09/10/18 1543 1 g at 09/10/18 1543  insulin lispro (HUMALOG) injection   SubCUTAneous AC&HS Jeny Muscat, DO   Stopped at 09/11/18 0730  rivaroxaban (XARELTO) tablet 15 mg  15 mg Oral DAILY WITH BREAKFAST Jeny Muscat, DO   15 mg at 09/11/18 2876  traMADol (ULTRAM) tablet 50 mg  50 mg Oral Q6H PRN Jeny Muscat, DO      
 influenza vaccine 2018-19 (6 mos+)(PF) (FLUARIX QUAD/FLULAVAL QUAD) injection 0.5 mL  0.5 mL IntraMUSCular PRIOR TO DISCHARGE Jeny Jimenez, DO      
 
 
 Review of Systems negative with exception of pertinent positives noted above PHYSICAL EXAM  
 
Visit Vitals  /80  Pulse 96  Temp 97.7 °F (36.5 °C)  Resp 18  Ht 5' 6\" (1.676 m)  Wt 99.8 kg (220 lb)  SpO2 96%  BMI 35.51 kg/m2 Temp (24hrs), Av.3 °F (36.8 °C), Min:97.7 °F (36.5 °C), Max:98.6 °F (37 °C) Oxygen Therapy O2 Sat (%): 96 % (18 0715) Pulse via Oximetry: 86 beats per minute (18 1329) O2 Device: Room air (18 1253) Intake/Output Summary (Last 24 hours) at 18 1123 Last data filed at 18 1047 Gross per 24 hour Intake              979 ml Output             3100 ml Net            -2121 ml General:                 No acute distress   
Lungs:                    CTA bilaterally Heart:                      S1S2 present without murmurs rubs gallops. No edema. RRR Abdomen:              Soft non tender, non distended. BS present. Extremities:           Moves ext spontaneously. No cyanosis. Neurologic:            A/O X4. No focal deficits Results summary of Diagnostic Studies/Procedures copied from within [facility] EMR: 
 
 
 
ASSESSMENT Active Hospital Problems Diagnosis Date Noted  Hyponatremia 2018  Risk for falls 2018 Pt had recent fall; no serious injury, but pt is high risk; referral sent to  to see if pt could get help getting set up for [employer].  Urinary tract infection 2012 Associated with chronic indwelling du catheter  Type II diabetes mellitus, uncontrolled ([address] 2012 Last HA1c 7.0; check today. Continue with daily self foot exams and annual eye exams.  Persistent atrial fibrillation ([address] 10/26/2009 Plan: Hyponatremia:  Resolved. Likely secondary to poor PO intake, dehydration. Stop IVF.  
  
UTI:  Urine cx with klebsiella. Stop rocephin D 5.  Change to oral vantin EOT  
  
 Chronic systolic/diastolic HF:  Holding diuretics given SAMAN, hyponatremia on admission. Could restart when appropriate. Echo pending. 
  
Hx afib:  Continue xarelto at renal dose. Will need dose adjusted on DC med list 
  
Bed bound X2 months:  PT reports max/mod assist.  Will need STR on DC 
  
DM 2:  A1C 8.2. Continue SSI. Will need treatment on DC. Not on home regimen prior to admission. 
  
Lymphedema:  OT for wraps 
  
  
Notes, labs, VS, diagnostic testing reviewed Time spent with pt 20 min DVT Prophylaxis: xarelto Plan of Care Discussed with: Supervising MD Dr. Mira Paz, care team, pt Ary Interiano, VONNIE

## 2018-09-11 NOTE — PROGRESS NOTES
Pt and family requested referrals be sent to [facility] and [facility]. Awaiting response.

## 2018-09-11 NOTE — PROGRESS NOTES
Problem: Mobility Impaired (Adult and Pediatric) Goal: *Acute Goals and Plan of Care (Insert Text) STG: 
(1.)Patient will roll side to side in bed with  MINIMAL ASSIST within 3-5 day(s). (2.)Patient will move from supine to sit and sit to supine  in bed with  MINIMAL ASSIST within 3-5 day(s). (3.)Patient will sit edge of bed with SUPERVISION  for 20 minutes while performing LE/trunk exercises within 3-5 day(s). 4.  Patient will perform sliding board transfer bed to chair with  MAXIMAL ASSIST within 3-5 day(s). LTG: 
(1.)Patient will move from supine to sit and sit to supine  and roll side to side in bed with  CONTACT GUARD ASSIST  within 7-10 day(s). (2.)Patient will transfer from bed to chair and chair to bed with  MODERATE ASSIST using the least restrictive device within 7-10 day(s). 4.  Patient will exhibit 3/5 strength B LE's to facilitate increased independence with bed mobility within 7-10 days. PHYSICAL THERAPY: Daily Note, Treatment Day: 1st, AM 9/11/2018 INPATIENT: Hospital Day: 5 Payor: Ant Vaughan / Plan: [address] [place] [address] HMO / Product Type: MOVL Care Medicare /  
  
NAME/AGE/GENDER: Valerie Ralph is a 80 y.o. female PRIMARY DIAGNOSIS: Hyponatremia Hyponatremia Hyponatremia ICD-10: Treatment Diagnosis:  
 · Generalized Muscle Weakness (M62.81) · Other abnormalities of gait and mobility (R26.89) Precaution/Allergies: 
Benazepril; Dynacirc [isradipine]; Peanut; and Morphine ASSESSMENT:  
 
Ms. Alise Cook presents in supine with nursing in the room. They were wanting to change her bed. Worked together to get treatment done and bed changed. Supine to sit with mod to max of 2. Edge of bed for about 7 minutes. Sit to stand with max assist of 2. For maybe 3 seconds. And max of 2 for sit to supine. She requires significant amount of assist.  Her legs are sore to the touch. Nursing left to finish bed in supine.   Spoke to the daughter in the hallway who understands that her mom cant go home like this. She tells me that every year she gets sick at this time. Its the time her mother and sister passed away. She tells me she used to be on anti depressants and wonders why she was taken off them. She has a list of facilities for CM. Continue to progress as able. At least we were able to stand today. Continue towards transfer to chair. This section established at most recent assessment PROBLEM LIST (Impairments causing functional limitations): 1. Decreased Strength 2. Decreased ADL/Functional Activities 3. Decreased Transfer Abilities 4. Decreased Ambulation Ability/Technique 5. Decreased Balance 6. Increased Pain 7. Decreased Activity Tolerance INTERVENTIONS PLANNED: (Benefits and precautions of physical therapy have been discussed with the patient.) 1. Balance Exercise 2. Bed Mobility 3. Gait Training 4. Therapeutic Activites 5. Therapeutic Exercise/Strengthening 6. Transfer Training 7. education 8. Group Therapy TREATMENT PLAN: Frequency/Duration: 3 times a week for duration of hospital stay Rehabilitation Potential For Stated Goals: Fair RECOMMENDED REHABILITATION/EQUIPMENT: (at time of discharge pending progress): Due to the probability of continued deficits (see above) this patient will likely need continued skilled physical therapy after discharge. Equipment:  
? None at this time HISTORY:  
History of Present Injury/Illness (Reason for Referral): 
Per MD note, \"79 yo female with pmh A Fib on Xarelto, CKD, DM 2, chronic systolic and diastolic HF who presented to ER this afternoon due to progressive weakness, fatigue, loss of appetite x 2 months, symptoms much worse in past few days. Pt found with Na 123, acute on chronic kidney disease, and UTI.  She was started on IV fluids and abx. pt denies CP, SOB, abd pain, fevers, nausea/vomiting/diarrhea.  Says she \"just didn't feel like eating\". She is bed bound with daily health aid in the home. \" 
Past Medical History/Comorbidities: Ms. Renuka Snow  has a past medical history of Acute gout involving toe of left foot (5/19/2017); CAD (coronary artery disease) (2007); Cardiomyopathy ([address] (11/25/2009); Chronic indwelling Harper catheter; Chronic kidney disease (2002); Chronic systolic heart failure ([address] (2007); Colon polyps (2016); CVA (cerebral vascular accident) [facility]); CVA (cerebral vascular accident) ([address] (8/8/2016); Deep vein thrombosis (DVT) of lower extremity ([address] (5/27/2016); Diabetes ([address] (2002); Diabetes mellitus ([address] (6/20/2012); Duodenal ulcer; Duodenal ulcer (11/18/2012); Duodenal ulcer (11/18/2012); Dyslipidemia (8/8/2016); Essential hypertension (10/26/2009); Heart failure ([address] Hypertension (2009); Hypoglycemia, unspecified (12/3/2012); JOÃO (iron deficiency anemia); Obesity, morbid (11/25/2009); Other ill-defined conditions(799.89) (2002); Persistent atrial fibrillation ([address] (10/26/2009); Pulmonary HTN ([address] SBO (small bowel obstruction) ([address] (8/8/2013); Sleep apnea (2010); Sleep apnea - noncompliant with CPAP (11/13/2012); Type II diabetes mellitus, uncontrolled ([address] (6/20/2012); Urinary tract infection (12/3/2012); UTI (lower urinary tract infection) (8/8/2013); and UTI (urinary tract infection) (11/23/2012). She also has no past medical history of Asthma; Autoimmune disease ([address] Cancer ([address] COPD; DEMENTIA; Liver disease; Psychiatric disorder; or Seizures ([address] Ms. Renuka Snow  has a past surgical history that includes hx hysterectomy (>20 years ago); hx tubal ligation (>40 years ago); hx colonoscopy (2016); and colonoscopy (N/A, 6/20/2016). Social History/Living Environment:  
Home Environment: Private residence One/Two Story Residence: One story Living Alone: No 
Support Systems: Family member(s), Home care staff Patient Expects to be Discharged to[de-identified] Rehabilitation facility Current DME Used/Available at Home: Wheelchair Prior Level of Function/Work/Activity: 
Lives at home with spouse. Has declined from ambulating with RW to w/c mobility to bedbound recently. Number of Personal Factors/Comorbidities that affect the Plan of Care: 3+: HIGH COMPLEXITY EXAMINATION:  
Most Recent Physical Functioning:  
Gross Assessment: 
  
         
  
Posture: 
  
Balance: 
Sitting: Impaired Sitting - Static: Fair (occasional) Sitting - Dynamic: Fair (occasional) Bed Mobility: 
Supine to Sit: Maximum assistance;Assist x2 Sit to Supine: Maximum assistance;Assist x2 Scooting: Total assistance Wheelchair Mobility: 
  
Transfers: 
Sit to Stand: Moderate assistance;Maximum assistance;Assist x2 Stand to Sit: Maximum assistance;Assist x2 Gait: 
  
   
  
Body Structures Involved: 1. Metabolic 2. Joints 3. Muscles Body Functions Affected: 1. Genitourinary 2. Movement Related 3. Metobolic/Endocrine Activities and Participation Affected: 1. Mobility 2. Self Care 3. Domestic Life Number of elements that affect the Plan of Care: 4+: HIGH COMPLEXITY CLINICAL PRESENTATION:  
Presentation: Evolving clinical presentation with unstable and unpredictable characteristics: HIGH COMPLEXITY CLINICAL DECISION MAKING:  
[facility] 6 Clicks Basic Mobility Inpatient Short Form How much difficulty does the patient currently have. .. Unable A Lot A Little None 1. Turning over in bed (including adjusting bedclothes, sheets and blankets)? [] 1   [x] 2   [] 3   [] 4  
2. Sitting down on and standing up from a chair with arms ( e.g., wheelchair, bedside commode, etc.)   [] 1   [x] 2   [] 3   [] 4  
3. Moving from lying on back to sitting on the side of the bed? [] 1   [x] 2   [] 3   [] 4 How much help from another person does the patient currently need. .. Total A Lot A Little None 4. Moving to and from a bed to a chair (including a wheelchair)?    [x] 1 [] 2   [] 3   [] 4  
5. Need to walk in hospital room? [x] 1   [] 2   [] 3   [] 4  
6. Climbing 3-5 steps with a railing? [x] 1   [] 2   [] 3   [] 4  
© 2007, Trustees of [address] [place] [address] [address] [address], under license to [employer]. All rights reserved Score:  Initial: 9 Most Recent: X (Date: -- ) Interpretation of Tool:  Represents activities that are increasingly more difficult (i.e. Bed mobility, Transfers, Gait). Score 24 23 22-20 19-15 14-10 9-7 6 Modifier CH CI CJ CK CL CM CN   
 
? Mobility - Walking and Moving Around:  
  - CURRENT STATUS: CM - 80%-99% impaired, limited or restricted  - GOAL STATUS: CL - 60%-79% impaired, limited or restricted  - D/C STATUS:  ---------------To be determined--------------- Payor: Pablo Langley / Plan: [address] [place] [address] HMO / Product Type: Managed Care Medicare /   
 
Medical Necessity:    
· Patient is expected to demonstrate progress in strength, range of motion, balance, coordination and functional technique to decrease assistance required with all functional mobility. Reason for Services/Other Comments: 
· Patient continues to require skilled intervention due to recent decline in functional mobility. Use of outcome tool(s) and clinical judgement create a POC that gives a: Difficult prediction of patient's progress: HIGH COMPLEXITY  
  
 
 
 
TREATMENT:  
(In addition to Assessment/Re-Assessment sessions the following treatments were rendered) Pre-treatment Symptoms/Complaints:   
Pain: Initial:  
Pain Intensity 1: 5 Pain Location 1: Leg 
Pain Orientation 1: Left, Right Pain Intervention(s) 1: Emotional support  Post Session:  None at rest.  
 
Therapeutic Activity: (    15): Therapeutic activities including Bed transfers to improve mobility. Required maximal   to promote motor control of bilateral, upper extremity(s), lower extremity(s). DATE: 9/8/18 Sitting EOB x10' Hip abduct/ adduct x10 AAB Heel slides  x10 AL, AAR Hip external/ internal rotation x10 AB Ankle dorsiflexion/ plantarflexion x10 AB Long arc quads x10 AB Key:  A=active, AA=active assisted, P=passive, B=bilaterally, R=right, L=left Braces/Orthotics/Lines/Etc:  
· IV 
· O2 Device: Room air Treatment/Session Assessment:   
· Response to Treatment:  Fatigued. · Interdisciplinary Collaboration:  
o Physical Therapist 
o Registered Nurse 
o Certified Nursing Assistant/Patient Care Technician · After treatment position/precautions:  
o Supine in bed 
o Bed/Chair-wheels locked 
o Call light within reach 
o RN notified 
o Family at bedside · Compliance with Program/Exercises: compliant all of the time. · Recommendations/Intent for next treatment session: \"Next visit will focus on advancements to more challenging activities and reduction in assistance provided\". Total Treatment Duration: PT Patient Time In/Time Out Time In: 1000 Time Out: 1015 Bhavna Jenkins, PT

## 2018-09-11 NOTE — PROGRESS NOTES
Hourly rounds performed and pt is resting in bed at this time. Pt had some expressive aphasia last night, but answers questions appropriately. No c/o pain or nausea.

## 2018-09-11 NOTE — PROGRESS NOTES
Family also has [address]. Referral sent once again to [facility] [place] that they run [employer]. Awaiting response.

## 2018-09-11 NOTE — PROGRESS NOTES
Interdisciplinary Rounds completed 09/11/18. Nursing, Case Management, Physician and PT present. Plan of care reviewed and updated. Waiting for precert.

## 2018-09-12 ENCOUNTER — HOME CARE VISIT (OUTPATIENT)
Dept: HOME HEALTH SERVICES | Facility: HOME HEALTH | Age: 83
End: 2018-09-12
Payer: MEDICARE

## 2018-09-12 LAB
GLUCOSE BLD STRIP.AUTO-MCNC: 145 MG/DL (ref 65–100)
GLUCOSE BLD STRIP.AUTO-MCNC: 173 MG/DL (ref 65–100)
GLUCOSE BLD STRIP.AUTO-MCNC: 244 MG/DL (ref 65–100)
GLUCOSE BLD STRIP.AUTO-MCNC: 293 MG/DL (ref 65–100)

## 2018-09-12 PROCEDURE — 74011636637 HC RX REV CODE- 636/637: Performed by: INTERNAL MEDICINE

## 2018-09-12 PROCEDURE — 74011250637 HC RX REV CODE- 250/637: Performed by: NURSE PRACTITIONER

## 2018-09-12 PROCEDURE — 65270000029 HC RM PRIVATE

## 2018-09-12 PROCEDURE — 3331090002 HH PPS REVENUE DEBIT

## 2018-09-12 PROCEDURE — 74011250637 HC RX REV CODE- 250/637: Performed by: INTERNAL MEDICINE

## 2018-09-12 PROCEDURE — 3331090001 HH PPS REVENUE CREDIT

## 2018-09-12 PROCEDURE — 82962 GLUCOSE BLOOD TEST: CPT

## 2018-09-12 PROCEDURE — 65390000012 HC CONDITION CODE 44 OBSERVATION

## 2018-09-12 RX ORDER — FUROSEMIDE 20 MG/1
20 TABLET ORAL DAILY
Status: DISCONTINUED | OUTPATIENT
Start: 2018-09-12 | End: 2018-09-14 | Stop reason: HOSPADM

## 2018-09-12 RX ADMIN — NYSTATIN: 100000 POWDER TOPICAL at 17:13

## 2018-09-12 RX ADMIN — TRAMADOL HYDROCHLORIDE 50 MG: 50 TABLET, FILM COATED ORAL at 13:49

## 2018-09-12 RX ADMIN — INSULIN LISPRO 2 UNITS: 100 INJECTION, SOLUTION INTRAVENOUS; SUBCUTANEOUS at 07:37

## 2018-09-12 RX ADMIN — CARVEDILOL 12.5 MG: 12.5 TABLET, FILM COATED ORAL at 07:26

## 2018-09-12 RX ADMIN — ISOSORBIDE MONONITRATE 30 MG: 30 TABLET, EXTENDED RELEASE ORAL at 08:43

## 2018-09-12 RX ADMIN — INSULIN LISPRO 4 UNITS: 100 INJECTION, SOLUTION INTRAVENOUS; SUBCUTANEOUS at 22:43

## 2018-09-12 RX ADMIN — FUROSEMIDE 20 MG: 20 TABLET ORAL at 11:01

## 2018-09-12 RX ADMIN — Medication 10 ML: at 13:16

## 2018-09-12 RX ADMIN — INSULIN LISPRO 6 UNITS: 100 INJECTION, SOLUTION INTRAVENOUS; SUBCUTANEOUS at 17:17

## 2018-09-12 RX ADMIN — Medication 10 ML: at 22:44

## 2018-09-12 RX ADMIN — CEFPODOXIME PROXETIL 200 MG: 200 TABLET, FILM COATED ORAL at 08:43

## 2018-09-12 RX ADMIN — CEFPODOXIME PROXETIL 200 MG: 200 TABLET, FILM COATED ORAL at 22:43

## 2018-09-12 RX ADMIN — NYSTATIN: 100000 POWDER TOPICAL at 08:43

## 2018-09-12 RX ADMIN — CARVEDILOL 12.5 MG: 12.5 TABLET, FILM COATED ORAL at 17:12

## 2018-09-12 RX ADMIN — ASPIRIN 81 MG 81 MG: 81 TABLET ORAL at 08:43

## 2018-09-12 RX ADMIN — RIVAROXABAN 15 MG: 15 TABLET, FILM COATED ORAL at 07:32

## 2018-09-12 NOTE — ADT AUTH CERT NOTES
Systemic or Infectious Condition [facility] - Care Day 4 (9/10/2018) by Jacquelyne Homans, RN     
  Review Status Review Entered    
  Completed 9/12/2018    
  Details    
      
  Care Day: 4 Care Date: 9/10/2018 Level of Care: Inpatient Floor    
  Guideline Day 3     
  Level Of Care    
  (X) * Activity level acceptable    
      
  Clinical Status    
  (X) * Hemodynamic stability    
  (X) * Respiratory status acceptable    
  (X) * Neurologic status acceptable    
  (X) * Temperature status acceptable    
  (X) * No infection, or status acceptable    
  (X) * No neutropenia, or status acceptable    
  (X) * Special infection or injury situations absent    
  (X) * Electrolyte status acceptable    
  ( ) * General Discharge Criteria met    
      
  Interventions    
  (X) * Intake acceptable    
  ( ) * No inpatient interventions needed    
      
      
      
      
  * Milestone    
      
  Additional Notes    
  General:                 No acute distress      
  Lungs:                    CTA bilaterally    
  Heart:                                S1S2 present without murmurs rubs gallops. No edema. RRR    
  Abdomen:              Soft non tender, non distended. BS present.     
  Extremities:           Moves ext spontaneously. No cyanosis.    
  Neurologic:            A/O X4. No focal deficits    
      
  Plan:    
       
  Hyponatremia: Richard Boatman secondary to poor PO intake, dehydration.  BMP in AM    
       
  UTI:  Urine cx with klebsiella.  Continue rocephin D 4    
       
  Chronic systolic/diastolic HF:  Holding diuretics given SAMAN, hyponatremia.  Will check echo, last echo on file 2015.     
       
  Hx afib:  Continue xarelto at renal dose.  Will need dose adjusted on DC med list    
       
  Bed bound X2 months:  PT reports max/mod assist.  Will need STR on DC    
       
  DM 2:  A1C 8.2.  Continue SSI.  Will need treatment on DC.  Not on home regimen prior to admission.    
        
  Lymphedema:  OT for wraps    
       
      
  /66, TEMP 98.1, HR 89, RR 20, O2 96 ON RA    
      
  , GLUC 313, BUN 93, CREAT 1.28    
      
  F GLUC 237, 317, 313    
      
  ECHO: -  Left ventricle: Systolic function was normal. Ejection fraction was    
  estimated to be 55 %. This study was inadequate for the evaluation of     
  regional    
  wall motion. Wall thickness was mildly increased.    
      
  -  Right ventricle: The ventricle was moderately dilated. Systolic function     
  was    
  reduced. There was moderate pulmonary artery hypertension.    
      
  -  Left atrium: The atrium was moderately to markedly dilated.    
      
  -  Right atrium: The atrium was moderately dilated.    
      
  -  Mitral valve: There was mild regurgitation.    
      
  -  Tricuspid valve:  There was moderate regurgitation.    
   
  Systemic or Infectious Condition [facility] - Care Day 3 (9/9/2018) by Cristy Marcelo RN     
  Review Status Review Entered    
  Completed 9/12/2018    
  Details    
      
  Care Day: 3 Care Date: 9/9/2018 Level of Care: Inpatient Floor    
  Guideline Day 3     
  Level Of Care    
  (X) * Activity level acceptable    
      
  Clinical Status    
  (X) * Hemodynamic stability    
  (X) * Respiratory status acceptable    
  (X) * Neurologic status acceptable    
  (X) * Temperature status acceptable    
  (X) * No infection, or status acceptable    
  (X) * No neutropenia, or status acceptable    
  (X) * Special infection or injury situations absent    
  (X) * Electrolyte status acceptable    
  ( ) * General Discharge Criteria met    
      
  Interventions    
  (X) * Intake acceptable    
  ( ) * No inpatient interventions needed    
      
      
      
      
  * Milestone    
      
  Additional Notes    
  General:                    No acute distress, Weak        
  Lungs:                                 CTA Bilaterally.     
   Heart:                                  Regular rate and rhythm,  No murmur, rub, or gallop    
  Abdomen:                  Soft, Non distended, Non tender, Positive bowel sounds    
  Extremities:               No cyanosis, clubbing.  Chronic lymphedema    
  Neurologic:                No focal deficits    
      
  A/P:    
       
  Hyponatremia- Na up to 130 with IV fluids but stable x 8 hours without further improvement of Na. Likely r/t dehydration due to poor intake plus multiple diuetics, which remain on hold.  Cont gentle IV fluids  If not will consult nephrology for assistance.     
        
  UTI- Urine culture with GNR, cont Rocephin D 3.    
       
  Chronic systolic and diastolic HF-  Diruetics on hold given SAMAN and dehydration. Will likely decrease dosing of diuretics on d/c.     
       
  Acute on CKD- Cr trending back to baseline.      
       
  Hx A Fib- Cont Xarelto, but decrease dose 15 mg for renal dosing.  This should be adjusted at d/c.     
        
  Bed bound- x 2 months.  Consult PT,OT.     
       
  DM 2- A1C 8.2. Cont humalog SSI achs while inpatient.  Needs either oral DM med or insulin on d/c.      
        
  Lymphedema- Consult wound care or OT for wraps.

## 2018-09-12 NOTE — PROGRESS NOTES
Progress Note Patient: Rosanne Simon MRN: [id number]  SSN: [id number] YOB: 1935  Age: 80 y.o. Sex: female Admit Date: 9/7/2018 LOS: 5 days Subjective:  
 
From previous note:  
 
\" Ms. Mehul Jaquez is a 79 yo female with PMH of afib on xarelto, CKD, DM 2, chronic systolic and diastolic HF (last echo 9643) who presented with c/o progressive weakness, fatigue, poor appetite X2 months.  She was found to be hyponatremic with Na 123, SAMAN on CKD with creatinine 2.25 (baseline around 1.5) and UA consistent with UTI.  Urine cx with klebsiella pneumoniae. Pt stared on rocephin.  \" Today patient is feeling stronger. Eating and drinking well. Working with physical therapy. No shortness of breath. No fever. No shaking. No chills. Objective:  
 
Vitals:  
 09/11/18 2005 09/12/18 5104 09/12/18 0726 09/12/18 0958 BP: 147/81 137/82 134/68 Pulse: 86 87 97 Resp: 18 18 18 Temp: 97.9 °F (36.6 °C) 98.2 °F (36.8 °C)  98.2 °F (36.8 °C) SpO2: 97% 97%  96% Weight:      
Height:      
  
 
Intake and Output: 
Current Shift:   
Last three shifts: 09/10 1901 - 09/12 0700 In: 979 [I.V.:979] Out: 2850 [Urine:2850] Physical Exam:  
General:                    The patient is in no acute distress. Head:                                   Normocephalic/atraumatic. Eyes:                                   No palpebral pallor or scleral icterus. ENT:                                    External auricular and nasal exam within normal limits. Mucous membranes are moist. 
Neck:                                   Supple, non-tender, no JVD. Lungs:                       Clear to auscultation bilaterally without wheezes or crackles. No respiratory distress or accessory muscle use. Heart:                                  Irregular rate and rhythm, without murmurs, rubs, or gallops. Abdomen:                  Soft, non-tender, non-distended with normoactive bowel sounds. Genitourinary:           No tenderness over the bladder or bilateral CVAs. Extremities:               Without clubbing, cyanosis, or edema. Skin:                                    Normal color, texture, and turgor. No rashes, lesions, or jaundice. Pulses:                      Radial and dorsalis pedis pulses present 2+ bilaterally. Capillary refill <2s. Neurologic:                CN II-XII grossly intact and symmetrical.  
                                            Moving all four extremities well with no focal deficits. Psychiatric:                Pleasant demeanor, appropriate affect. Alert and oriented x 3 Lab/Data Review: 
 
Recent Results (from the past 24 hour(s)) GLUCOSE, POC Collection Time: 09/11/18 11:50 AM  
Result Value Ref Range Glucose (POC) 205 (H) 65 - 100 mg/dL GLUCOSE, POC Collection Time: 09/11/18  4:01 PM  
Result Value Ref Range Glucose (POC) 266 (H) 65 - 100 mg/dL GLUCOSE, POC Collection Time: 09/11/18  9:01 PM  
Result Value Ref Range Glucose (POC) 263 (H) 65 - 100 mg/dL GLUCOSE, POC Collection Time: 09/12/18  7:31 AM  
Result Value Ref Range Glucose (POC) 173 (H) 65 - 100 mg/dL 9/11/2018  6:39 AM - [facility], Lab In [facility]  
Component Results Component Value Flag Ref Range Units Status Sodium 138  136 - 145 mmol/L Final  
Potassium 3.8  3.5 - 5.1 mmol/L Final  
Chloride 100  98 - 107 mmol/L Final  
CO2 28  21 - 32 mmol/L Final  
Anion gap 10  7 - 16 mmol/L Final  
Glucose 97  65 - 100 mg/dL Final  
Comment:  
47 - 60 mg/dl Consistent with, but not fully diagnostic of hypoglycemia. 101 - 125 mg/dl Impaired fasting glucose/consistent with pre-diabetes mellitus  
> 126 mg/dl Fasting glucose consistent with overt diabetes mellitus BUN 85 (H) 8 - 23 MG/DL Final  
Creatinine 1.10 (H) 0.6 - 1.0 MG/DL Final  
 GFR est AA >60  >60 ml/min/1.73m2 Final  
GFR est non-AA 51 (L) >60 ml/min/1.73m2 Final  
Comment:  
(NOTE) Estimated GFR is calculated using the Modification of Diet in Renal  
Disease (MDRD) Study equation, reported for both  [facility]) and non- Americans (GFRNA), and normalized to 1.73m2  
body surface area. The physician must decide which value applies to  
the patient. The MDRD study equation should only be used in  
individuals age 25 or older. It has not been validated for the  
following: pregnant women, patients with serious comorbid conditions,  
or on certain medications, or persons with extremes of body size,  
muscle mass, or nutritional status. Calcium 10.4  8.3 - 10.4 MG/DL Final  
 
 
Echo 
9/10/2018 SUMMARY: 
 
-  Left ventricle: Systolic function was normal. Ejection fraction was 
estimated to be 55 %. This study was inadequate for the evaluation of  
regional 
wall motion. Wall thickness was mildly increased. -  Right ventricle: The ventricle was moderately dilated. Systolic function  
was 
reduced. There was moderate pulmonary artery hypertension. 
 
-  Left atrium: The atrium was moderately to markedly dilated. -  Right atrium: The atrium was moderately dilated. -  Mitral valve: There was mild regurgitation. -  Tricuspid valve: There was moderate regurgitation. Current Facility-Administered Medications:  
  cefpodoxime (VANTIN) tablet 200 mg, 200 mg, Oral, Q12H, Christiana Hernandez NP, 200 mg at 09/12/18 6954   nystatin (MYCOSTATIN) 100,000 unit/gram powder, , Topical, BID, Deri Bumps, DO 
  aspirin chewable tablet 81 mg, 81 mg, Oral, DAILY, Richard Mckeon DO, 81 mg at 09/12/18 1547   carvedilol (COREG) tablet 12.5 mg, 12.5 mg, Oral, BID WITH MEALS, Richard Mckeon DO, 12.5 mg at 09/12/18 3096   isosorbide mononitrate ER (IMDUR) tablet 30 mg, 30 mg, Oral, DAILY, Richard Mckeon DO, 30 mg at 09/12/18 7650   sodium chloride (NS) flush 5-10 mL, 5-10 mL, IntraVENous, Q8H, Sary Demarcus Mckeon, DO, 10 mL at 09/11/18 2236   sodium chloride (NS) flush 5-10 mL, 5-10 mL, IntraVENous, PRN, Arelis Bruce DO 
  acetaminophen (TYLENOL) tablet 650 mg, 650 mg, Oral, Q4H PRN, Arelis Bruce DO 
  ondansetron [facility]) injection 4 mg, 4 mg, IntraVENous, Q4H PRN, Arelis Bruce DO 
  insulin lispro (HUMALOG) injection, , SubCUTAneous, AC&HS, Arelis Bruce DO, 2 Units at 09/12/18 3149   rivaroxaban (XARELTO) tablet 15 mg, 15 mg, Oral, DAILY WITH BREAKFAST, Sary Mckeon, , 15 mg at 09/12/18 5929   traMADol (ULTRAM) tablet 50 mg, 50 mg, Oral, Q6H PRN, Arelis Bruce DO 
  influenza vaccine 2018-19 (6 mos+)(PF) (FLUARIX QUAD/FLULAVAL QUAD) injection 0.5 mL, 0.5 mL, IntraMUSCular, PRIOR TO DISCHARGE, Arelis Bruce DO Assessment:  
 
Principal Problem: Hyponatremia (9/7/2018) Active Problems: 
  Persistent atrial fibrillation ([facility] [address] (10/26/2009) Type II diabetes mellitus, uncontrolled ([facility] [address] (6/20/2012) Overview: Last HA1c 7.0; check today. Continue with daily self foot exams and annual  
    eye exams. Urinary tract infection (12/3/2012) Overview: Associated with chronic indwelling du catheter Risk for falls (4/11/2018) Overview: Pt had recent fall; no serious injury, but pt is high risk; referral sent  
    to  to see if pt could get help getting set up for [facility]. Plan: Hyponatremia Improved and normalized Na now. Will resume Lasix at smaller dose due to echo findings. AF On Xarelto. Rate is controlled. Diabetes mellitus type 2 Monitor blood sugar. Cover with insulin sliding scale accordingly. UTI On Vantin. Continue physical therapy. Pending STR acceptance. I have discussed the plan of care with patient. Signed By: Nav Montgomery MD   
 September 12, 2018

## 2018-09-13 LAB
ANION GAP SERPL CALC-SCNC: 9 MMOL/L (ref 7–16)
BUN SERPL-MCNC: 72 MG/DL (ref 8–23)
CALCIUM SERPL-MCNC: 10.4 MG/DL (ref 8.3–10.4)
CHLORIDE SERPL-SCNC: 98 MMOL/L (ref 98–107)
CO2 SERPL-SCNC: 28 MMOL/L (ref 21–32)
CREAT SERPL-MCNC: 1.33 MG/DL (ref 0.6–1)
GLUCOSE BLD STRIP.AUTO-MCNC: 183 MG/DL (ref 65–100)
GLUCOSE BLD STRIP.AUTO-MCNC: 236 MG/DL (ref 65–100)
GLUCOSE BLD STRIP.AUTO-MCNC: 253 MG/DL (ref 65–100)
GLUCOSE BLD STRIP.AUTO-MCNC: 301 MG/DL (ref 65–100)
GLUCOSE SERPL-MCNC: 227 MG/DL (ref 65–100)
POTASSIUM SERPL-SCNC: 4.2 MMOL/L (ref 3.5–5.1)
SODIUM SERPL-SCNC: 135 MMOL/L (ref 136–145)

## 2018-09-13 PROCEDURE — 3331090002 HH PPS REVENUE DEBIT

## 2018-09-13 PROCEDURE — 97110 THERAPEUTIC EXERCISES: CPT

## 2018-09-13 PROCEDURE — 36415 COLL VENOUS BLD VENIPUNCTURE: CPT

## 2018-09-13 PROCEDURE — 97530 THERAPEUTIC ACTIVITIES: CPT

## 2018-09-13 PROCEDURE — 65270000029 HC RM PRIVATE

## 2018-09-13 PROCEDURE — 74011250637 HC RX REV CODE- 250/637: Performed by: NURSE PRACTITIONER

## 2018-09-13 PROCEDURE — 74011636637 HC RX REV CODE- 636/637: Performed by: INTERNAL MEDICINE

## 2018-09-13 PROCEDURE — 74011250637 HC RX REV CODE- 250/637: Performed by: INTERNAL MEDICINE

## 2018-09-13 PROCEDURE — 3331090001 HH PPS REVENUE CREDIT

## 2018-09-13 PROCEDURE — 82962 GLUCOSE BLOOD TEST: CPT

## 2018-09-13 PROCEDURE — 99218 HC RM OBSERVATION: CPT

## 2018-09-13 PROCEDURE — 65390000012 HC CONDITION CODE 44 OBSERVATION

## 2018-09-13 PROCEDURE — 80048 BASIC METABOLIC PNL TOTAL CA: CPT

## 2018-09-13 RX ADMIN — ASPIRIN 81 MG 81 MG: 81 TABLET ORAL at 10:33

## 2018-09-13 RX ADMIN — INSULIN LISPRO 2 UNITS: 100 INJECTION, SOLUTION INTRAVENOUS; SUBCUTANEOUS at 09:15

## 2018-09-13 RX ADMIN — Medication 5 ML: at 22:00

## 2018-09-13 RX ADMIN — TRAMADOL HYDROCHLORIDE 50 MG: 50 TABLET, FILM COATED ORAL at 23:08

## 2018-09-13 RX ADMIN — INSULIN LISPRO 8 UNITS: 100 INJECTION, SOLUTION INTRAVENOUS; SUBCUTANEOUS at 22:00

## 2018-09-13 RX ADMIN — NYSTATIN: 100000 POWDER TOPICAL at 18:00

## 2018-09-13 RX ADMIN — ISOSORBIDE MONONITRATE 30 MG: 30 TABLET, EXTENDED RELEASE ORAL at 10:33

## 2018-09-13 RX ADMIN — Medication 10 ML: at 05:30

## 2018-09-13 RX ADMIN — RIVAROXABAN 15 MG: 15 TABLET, FILM COATED ORAL at 10:33

## 2018-09-13 RX ADMIN — Medication 5 ML: at 14:11

## 2018-09-13 RX ADMIN — CEFPODOXIME PROXETIL 200 MG: 200 TABLET, FILM COATED ORAL at 22:18

## 2018-09-13 RX ADMIN — CEFPODOXIME PROXETIL 200 MG: 200 TABLET, FILM COATED ORAL at 10:32

## 2018-09-13 RX ADMIN — FUROSEMIDE 20 MG: 20 TABLET ORAL at 10:33

## 2018-09-13 RX ADMIN — INSULIN LISPRO 6 UNITS: 100 INJECTION, SOLUTION INTRAVENOUS; SUBCUTANEOUS at 11:58

## 2018-09-13 RX ADMIN — CARVEDILOL 12.5 MG: 12.5 TABLET, FILM COATED ORAL at 17:24

## 2018-09-13 RX ADMIN — NYSTATIN: 100000 POWDER TOPICAL at 10:32

## 2018-09-13 RX ADMIN — INSULIN LISPRO 4 UNITS: 100 INJECTION, SOLUTION INTRAVENOUS; SUBCUTANEOUS at 17:24

## 2018-09-13 RX ADMIN — CARVEDILOL 12.5 MG: 12.5 TABLET, FILM COATED ORAL at 10:33

## 2018-09-13 NOTE — PROGRESS NOTES
Message left for [facility] to see if they have other SNF selections. Awaiting call back.

## 2018-09-13 NOTE — PROGRESS NOTES
Hospitalist Progress Note 2018 Admit Date: 2018 12:57 PM  
NAME: Ladi Castellon :  1935 MRN:  [id number] Attending: Mat Mccloud DO 
PCP:  Mary Fallon MD 
 
SUBJECTIVE:  
 
79 yo female with pmh A Fib on Xarelto, CKD, DM 2, chronic systolic and diastolic HF who presented to ER t09 due to progressive weakness, fatigue, loss of appetite x 2 months. Pt found with Na 123, acute on chronic kidney disease, and UTI. She was started on IV fluids and abx. Na and Cr much improved with hydration. And holding diuretics. Echo repeated shows EF 49-69% and diastolic dysfunction. Lasix resumed at smaller dose, still holding Demadex and Zaroxolyn. Pt is more alert and states appetite is improving some. She is stable for d/c home, waiting for STR placement. Review of Systems negative with exception of pertinent positives noted above PHYSICAL EXAM  
 
Visit Vitals  /58 (BP 1 Location: Right arm, BP Patient Position: Head of bed elevated (Comment degrees))  Pulse 83  Temp 98.7 °F (37.1 °C)  Resp 20  
 Ht 5' 6\" (1.676 m)  Wt 99.8 kg (220 lb)  SpO2 98%  BMI 35.51 kg/m2 Temp (24hrs), Av.7 °F (37.1 °C), Min:98.6 °F (37 °C), Max:98.9 °F (37.2 °C) Oxygen Therapy O2 Sat (%): 98 % (18 1118) Pulse via Oximetry: 86 beats per minute (18 1329) O2 Device: Room air (18 1253) Intake/Output Summary (Last 24 hours) at 18 1402 Last data filed at 18 1145 Gross per 24 hour Intake              360 ml Output             2100 ml Net            -1740 ml General: No acute distress, alert, weak Lungs:  CTA Bilaterally. Heart:  Regular rate and rhythm,  No murmur, rub, or gallop Abdomen: Soft, Non distended, Non tender, Positive bowel sounds Extremities: No cyanosis, clubbing or edema Neurologic:  No focal deficits ASSESSMENT Active Hospital Problems Diagnosis Date Noted  Hyponatremia 2018  Risk for falls 04/11/2018 Pt had recent fall; no serious injury, but pt is high risk; referral sent to  to see if pt could get help getting set up for [employer].  Urinary tract infection 12/03/2012 Associated with chronic indwelling du catheter  Type II diabetes mellitus, uncontrolled ([facility] [address] 06/20/2012 Last HA1c 7.0; check today. Continue with daily self foot exams and annual eye exams.  Persistent atrial fibrillation ([facility] [address] 10/26/2009 A/P: 
   
Hyponatremia- Likely r/t dehydration due to poor intake plus multiple diuetics. Resolved, Na 135. Resume Lasix at smaller dose. UTI- UCx with Klebsiella Pneumoniae. Vantin D 5, eot 09/15. Chronic systolic and diastolic HF-  Resume Lasix at 20 mg daily. Hold Demadex, Zaroxolyn. Will have pt follow up with cards outpatient for further management. CKD- Cr improved with hydration. Hx A Fib- Cont Xarelto, but decrease dose 15 mg which should likely be her dose at d/c due to CKD. Rate controlled, cont home regimen Coreg. Obtain EKG. Bed bound- x 2 months. Consult PT,OT. DM 2- Obtain A1C. Humalog SSI achs while inpatient. Lymphedema- Consult wound care or OT for wraps. DC planning- Medically stable for d/c but waiting for STR placement. DVT Prophylaxis:  Xarelto Signed By: Harry Paulino NP September 13, 2018

## 2018-09-13 NOTE — PROGRESS NOTES
Problem: Mobility Impaired (Adult and Pediatric) Goal: *Acute Goals and Plan of Care (Insert Text) STG: 
(1.)Patient will roll side to side in bed with  MINIMAL ASSIST within 3-5 day(s). (2.)Patient will move from supine to sit and sit to supine  in bed with  MINIMAL ASSIST within 3-5 day(s). (3.)Patient will sit edge of bed with SUPERVISION  for 20 minutes while performing LE/trunk exercises within 3-5 day(s). 4.  Patient will perform sliding board transfer bed to chair with  MAXIMAL ASSIST within 3-5 day(s). LTG: 
(1.)Patient will move from supine to sit and sit to supine  and roll side to side in bed with  CONTACT GUARD ASSIST  within 7-10 day(s). (2.)Patient will transfer from bed to chair and chair to bed with  MODERATE ASSIST using the least restrictive device within 7-10 day(s). 4.  Patient will exhibit 3/5 strength B LE's to facilitate increased independence with bed mobility within 7-10 days. PHYSICAL THERAPY: Daily Note, Treatment Day: 2nd, PM 9/13/2018 INPATIENT: Hospital Day: 7 Payor: Rene Rosales / Plan: [address] [place] [address] HMO / Product Type: [employer] Medicare /  
  
NAME/AGE/GENDER: Juan M Solorzano is a 80 y.o. female PRIMARY DIAGNOSIS: Hyponatremia Hyponatremia Hyponatremia ICD-10: Treatment Diagnosis:  
 · Generalized Muscle Weakness (M62.81) · Other abnormalities of gait and mobility (R26.89) Precaution/Allergies: 
Benazepril; Dynacirc [isradipine]; Peanut; and Morphine ASSESSMENT:  
 
Ms. Jeff Giles presents supine in bed, family present, and pt is agreeable to therapy. Pt performed supine to sit with max assist x 2. Pt worked on scooting to the L 4 times to simulate a sliding board transfer. Pt however required total assist x 2. Pt not pushing thru UE's despite verbal and manual cues. Pt was able to perform below LE exercises with assistance to complete range on L LE.  Pt's L LE weaker 2* previous CVA. Pt does require increased verbal cues to stay on task with mobility and exercises. Pt returned supine with max assist, performed rolling to the L with mod/max assist.  Pt was left on L side with pillows to back and between LE's, family present. Slow progress towards goals. Will continue with POC. This section established at most recent assessment PROBLEM LIST (Impairments causing functional limitations): 1. Decreased Strength 2. Decreased ADL/Functional Activities 3. Decreased Transfer Abilities 4. Decreased Ambulation Ability/Technique 5. Decreased Balance 6. Increased Pain 7. Decreased Activity Tolerance INTERVENTIONS PLANNED: (Benefits and precautions of physical therapy have been discussed with the patient.) 1. Balance Exercise 2. Bed Mobility 3. Gait Training 4. Therapeutic Activites 5. Therapeutic Exercise/Strengthening 6. Transfer Training 7. education 8. Group Therapy TREATMENT PLAN: Frequency/Duration: 3 times a week for duration of hospital stay Rehabilitation Potential For Stated Goals: Fair RECOMMENDED REHABILITATION/EQUIPMENT: (at time of discharge pending progress): Due to the probability of continued deficits (see above) this patient will likely need continued skilled physical therapy after discharge. Equipment:  
? None at this time HISTORY:  
History of Present Injury/Illness (Reason for Referral): 
Per MD note, \"81 yo female with pmh A Fib on Xarelto, CKD, DM 2, chronic systolic and diastolic HF who presented to ER this afternoon due to progressive weakness, fatigue, loss of appetite x 2 months, symptoms much worse in past few days. Pt found with Na 123, acute on chronic kidney disease, and UTI.  She was started on IV fluids and abx. pt denies CP, SOB, abd pain, fevers, nausea/vomiting/diarrhea. Says she \"just didn't feel like eating\". She is bed bound with daily health aid in the home. \" 
Past Medical History/Comorbidities: Ms. Juan F Ashby  has a past medical history of Acute gout involving toe of left foot (5/19/2017); CAD (coronary artery disease) (2007); Cardiomyopathy ([address] (11/25/2009); Chronic indwelling Harper catheter; Chronic kidney disease (2002); Chronic systolic heart failure ([address] (2007); Colon polyps (2016); CVA (cerebral vascular accident) [facility]); CVA (cerebral vascular accident) ([address] (8/8/2016); Deep vein thrombosis (DVT) of lower extremity ([address] (5/27/2016); Diabetes ([address] (2002); Diabetes mellitus ([address] (6/20/2012); Duodenal ulcer; Duodenal ulcer (11/18/2012); Duodenal ulcer (11/18/2012); Dyslipidemia (8/8/2016); Essential hypertension (10/26/2009); Heart failure ([address] Hypertension (2009); Hypoglycemia, unspecified (12/3/2012); JOÃO (iron deficiency anemia); Obesity, morbid (11/25/2009); Other ill-defined conditions(799.89) (2002); Persistent atrial fibrillation ([address] (10/26/2009); Pulmonary HTN ([address] SBO (small bowel obstruction) ([address] (8/8/2013); Sleep apnea (2010); Sleep apnea - noncompliant with CPAP (11/13/2012); Type II diabetes mellitus, uncontrolled ([address] (6/20/2012); Urinary tract infection (12/3/2012); UTI (lower urinary tract infection) (8/8/2013); and UTI (urinary tract infection) (11/23/2012). She also has no past medical history of Asthma; Autoimmune disease ([address] Cancer ([address] COPD; DEMENTIA; Liver disease; Psychiatric disorder; or Seizures ([address] Ms. Juan F Ashby  has a past surgical history that includes hx hysterectomy (>20 years ago); hx tubal ligation (>40 years ago); hx colonoscopy (2016); and colonoscopy (N/A, 6/20/2016). Social History/Living Environment:  
Home Environment: Private residence One/Two Story Residence: One story Living Alone: No 
Support Systems: Family member(s), Home care staff Patient Expects to be Discharged to[de-identified] Rehabilitation facility Current DME Used/Available at Home: Wheelchair Prior Level of Function/Work/Activity: Lives at home with spouse. Has declined from ambulating with RW to w/c mobility to bedbound recently. Number of Personal Factors/Comorbidities that affect the Plan of Care: 3+: HIGH COMPLEXITY EXAMINATION:  
Most Recent Physical Functioning:  
Gross Assessment: 
  
         
  
Posture: 
  
Balance: 
Sitting - Static: Fair (occasional) (to fair+) Sitting - Dynamic: Fair (occasional) Bed Mobility: 
Supine to Sit: Maximum assistance;Assist x2 Sit to Supine: Maximum assistance Scooting: Total assistance;Assist x2 Wheelchair Mobility: 
  
Transfers: 
  
Gait: 
  
   
  
Body Structures Involved: 1. Metabolic 2. Joints 3. Muscles Body Functions Affected: 1. Genitourinary 2. Movement Related 3. Metobolic/Endocrine Activities and Participation Affected: 1. Mobility 2. Self Care 3. Domestic Life Number of elements that affect the Plan of Care: 4+: HIGH COMPLEXITY CLINICAL PRESENTATION:  
Presentation: Evolving clinical presentation with unstable and unpredictable characteristics: HIGH COMPLEXITY CLINICAL DECISION MAKIN [facility] [address] [id number] AM-PAC 6 Clicks Basic Mobility Inpatient Short Form How much difficulty does the patient currently have. .. Unable A Lot A Little None 1. Turning over in bed (including adjusting bedclothes, sheets and blankets)? [] 1   [x] 2   [] 3   [] 4  
2. Sitting down on and standing up from a chair with arms ( e.g., wheelchair, bedside commode, etc.)   [] 1   [x] 2   [] 3   [] 4  
3. Moving from lying on back to sitting on the side of the bed? [] 1   [x] 2   [] 3   [] 4 How much help from another person does the patient currently need. .. Total A Lot A Little None 4. Moving to and from a bed to a chair (including a wheelchair)? [x] 1   [] 2   [] 3   [] 4  
5. Need to walk in hospital room? [x] 1   [] 2   [] 3   [] 4  
6. Climbing 3-5 steps with a railing?    [x] 1   [] 2   [] 3   [] 4  
 © 2007, Trustees of [address] [place] [address] [address] [address], under license to [employer]. All rights reserved Score:  Initial: 9 Most Recent: X (Date: -- ) Interpretation of Tool:  Represents activities that are increasingly more difficult (i.e. Bed mobility, Transfers, Gait). Score 24 23 22-20 19-15 14-10 9-7 6 Modifier CH CI CJ CK CL CM CN   
 
? Mobility - Walking and Moving Around:  
  - CURRENT STATUS: CM - 80%-99% impaired, limited or restricted  - GOAL STATUS: CL - 60%-79% impaired, limited or restricted  - D/C STATUS:  ---------------To be determined--------------- Payor: Joyce Lizama / Plan: [address] [place] [address] HMO / Product Type: [employer] Medicare /   
 
Medical Necessity:    
· Patient is expected to demonstrate progress in strength, range of motion, balance, coordination and functional technique to decrease assistance required with all functional mobility. Reason for Services/Other Comments: 
· Patient continues to require skilled intervention due to recent decline in functional mobility. Use of outcome tool(s) and clinical judgement create a POC that gives a: Difficult prediction of patient's progress: HIGH COMPLEXITY  
  
 
 
 
TREATMENT:  
  
Pre-treatment Symptoms/Complaints:   
Pain: Initial:  
Pain Intensity 1: 0  Post Session:  None at rest.  
 
Therapeutic Activity: (    15 minutes): Therapeutic activities including Bed transfers to improve mobility. Required maximal   to promote motor control of bilateral, upper extremity(s), lower extremity(s). Therapeutic Exercise: (  8 minutes):  Exercises per grid below to improve mobility, strength and balance. Required minimal visual, verbal and manual cues to promote proper body posture and promote proper body mechanics. Progressed range and repetitions as indicated. DATE: 9/8/18 9/13/18 Sitting EOB x10' X 15 min Hip abduct/ adduct x10 AAB Heel slides  x10 AL, AAR       
 Hip external/ internal rotation x10 AB Ankle dorsiflexion/ plantarflexion x10 AB X 10 B Long arc quads x10 AB X 10 B Marching   X 10 B, AA-L Seated hip abd  X 10 B, AA-L      
 
Key:  A=active, AA=active assisted, P=passive, B=bilaterally, R=right, L=left Braces/Orthotics/Lines/Etc:  
· IV 
· O2 Device: Room air Treatment/Session Assessment:   
· Response to Treatment:  Fatigued. · Interdisciplinary Collaboration:  
o Physical Therapy Assistant 
o Registered Nurse 
o Certified Nursing Assistant/Patient Care Technician · After treatment position/precautions:  
o Supine in bed 
o Bed/Chair-wheels locked 
o Call light within reach 
o RN notified 
o Family at bedside · Compliance with Program/Exercises: compliant all of the time. · Recommendations/Intent for next treatment session: \"Next visit will focus on advancements to more challenging activities and reduction in assistance provided\". Total Treatment Duration: PT Patient Time In/Time Out Time In: 8089 Time Out: 4885 Magdalena Lynn, PTA

## 2018-09-13 NOTE — PROGRESS NOTES
Rounded every hour and prn. Spouse at bedside most of the day. He fed pt meals. Later on pt's daughter was at bedside and asked if the pt had been being fed. First reflection was that the daughter may have thought the pt had not been fed by staff and missed opportunities to eat. RN reported that the spouse had fed the pt and the daughter appeared what may be described as slightly annoyed but not unpleasant, and reported that her mother can feed herself. Pt able to state name and answered questions appropriately, such as where she lived and who was in the room. Denied pain. Swallowed PO meds without difficulty.

## 2018-09-13 NOTE — PROGRESS NOTES
Patient has been accepted to [facility]. Notified family. Family has accepted bed offer. Facility has started pre-cert.

## 2018-09-13 NOTE — PROGRESS NOTES
Met with family regarding SNF selections. Family would like referrals sent to [facility] and [facility]. Referrals sent. Awaiting response. Patient has [employer] and will require a pre-cert.

## 2018-09-13 NOTE — PROGRESS NOTES
Family very adamant about patient going to [facility]. Call placed several times to [facility] to see if they can use patient's blue cross as primary. Message left for Johnie Barker and awaiting call back.

## 2018-09-13 NOTE — PROGRESS NOTES
Virtual Utilization Review RN  has determined this patient meets the Condition Code 44 criteria under the Medicare guidelines for the change from Inpatient to observation status. In accordance with Medicare Guidelines, Admission status has been changed in the computer system. A copy of the Utilization Review RN documentation and the [facility] letter explaining the outpatient/observation services was given to patient/patients daughter representative with verbal explanation and verbal understanding was received about information given. Letter was signed by patients daughter with date and time. Signed copy placed in the patient's chart for scanning by HIS and copy left with patients daughter who was sitting next to patient in patients room.  notified.

## 2018-09-13 NOTE — PROGRESS NOTES
Received call from daughter jose angel. They would like referrals sent to [facility] and [facility]. Referrals sent. Awaiting response.

## 2018-09-13 NOTE — PROGRESS NOTES
Interdisciplinary Rounds completed 09/13/18. Nursing, Case Management, Physician and PT present. Plan of care reviewed and updated.  
 
Discharge to rehab in the am.

## 2018-09-14 ENCOUNTER — APPOINTMENT (OUTPATIENT)
Dept: GENERAL RADIOLOGY | Age: 83
End: 2018-09-14
Attending: NURSE PRACTITIONER
Payer: MEDICARE

## 2018-09-14 VITALS
SYSTOLIC BLOOD PRESSURE: 115 MMHG | RESPIRATION RATE: 20 BRPM | HEIGHT: 66 IN | OXYGEN SATURATION: 97 % | WEIGHT: 220 LBS | HEART RATE: 90 BPM | TEMPERATURE: 98.3 F | BODY MASS INDEX: 35.36 KG/M2 | DIASTOLIC BLOOD PRESSURE: 71 MMHG

## 2018-09-14 LAB
ATRIAL RATE: 81 BPM
CALCULATED R AXIS, ECG10: 108 DEGREES
CALCULATED T AXIS, ECG11: 13 DEGREES
DIAGNOSIS, 93000: NORMAL
GLUCOSE BLD STRIP.AUTO-MCNC: 124 MG/DL (ref 65–100)
GLUCOSE BLD STRIP.AUTO-MCNC: 240 MG/DL (ref 65–100)
Q-T INTERVAL, ECG07: 406 MS
QRS DURATION, ECG06: 164 MS
QTC CALCULATION (BEZET), ECG08: 491 MS
TROPONIN I SERPL-MCNC: 0.02 NG/ML (ref 0.02–0.05)
VENTRICULAR RATE, ECG03: 88 BPM

## 2018-09-14 PROCEDURE — 74011250636 HC RX REV CODE- 250/636: Performed by: INTERNAL MEDICINE

## 2018-09-14 PROCEDURE — 90686 IIV4 VACC NO PRSV 0.5 ML IM: CPT | Performed by: INTERNAL MEDICINE

## 2018-09-14 PROCEDURE — 93005 ELECTROCARDIOGRAM TRACING: CPT | Performed by: NURSE PRACTITIONER

## 2018-09-14 PROCEDURE — 82962 GLUCOSE BLOOD TEST: CPT

## 2018-09-14 PROCEDURE — 74011250637 HC RX REV CODE- 250/637: Performed by: INTERNAL MEDICINE

## 2018-09-14 PROCEDURE — 3331090001 HH PPS REVENUE CREDIT

## 2018-09-14 PROCEDURE — 74011636637 HC RX REV CODE- 636/637: Performed by: INTERNAL MEDICINE

## 2018-09-14 PROCEDURE — 3331090002 HH PPS REVENUE DEBIT

## 2018-09-14 PROCEDURE — 36415 COLL VENOUS BLD VENIPUNCTURE: CPT

## 2018-09-14 PROCEDURE — 84484 ASSAY OF TROPONIN QUANT: CPT

## 2018-09-14 PROCEDURE — 71045 X-RAY EXAM CHEST 1 VIEW: CPT

## 2018-09-14 PROCEDURE — 99218 HC RM OBSERVATION: CPT

## 2018-09-14 PROCEDURE — 90471 IMMUNIZATION ADMIN: CPT | Performed by: NURSE PRACTITIONER

## 2018-09-14 RX ORDER — FUROSEMIDE 20 MG/1
20 TABLET ORAL DAILY
Qty: 30 TAB | Refills: 0 | Status: SHIPPED
Start: 2018-09-14 | End: 2018-10-22 | Stop reason: SDUPTHER

## 2018-09-14 RX ADMIN — RIVAROXABAN 15 MG: 15 TABLET, FILM COATED ORAL at 09:19

## 2018-09-14 RX ADMIN — NYSTATIN: 100000 POWDER TOPICAL at 09:00

## 2018-09-14 RX ADMIN — INSULIN LISPRO 4 UNITS: 100 INJECTION, SOLUTION INTRAVENOUS; SUBCUTANEOUS at 11:26

## 2018-09-14 RX ADMIN — Medication 10 ML: at 07:42

## 2018-09-14 RX ADMIN — CARVEDILOL 12.5 MG: 12.5 TABLET, FILM COATED ORAL at 09:19

## 2018-09-14 RX ADMIN — INFLUENZA VIRUS VACCINE 0.5 ML: 15; 15; 15; 15 SUSPENSION INTRAMUSCULAR at 13:14

## 2018-09-14 RX ADMIN — ISOSORBIDE MONONITRATE 30 MG: 30 TABLET, EXTENDED RELEASE ORAL at 09:18

## 2018-09-14 RX ADMIN — FUROSEMIDE 20 MG: 20 TABLET ORAL at 09:19

## 2018-09-14 RX ADMIN — ASPIRIN 81 MG 81 MG: 81 TABLET ORAL at 09:00

## 2018-09-14 NOTE — PROGRESS NOTES
Problem: Nutrition Deficit Goal: *Optimize nutritional status Nutrition Reason for assessment: Follow Up Assessment:  
Diet order(s): Cardiac, Glucerna TID Food/Nutrition Patient History:  The patient reports that her appetite is good. She denies any nausea but does report some vomiting after breakfast this morning. She denies any difficulties with constipation, diarrhea, chewing or swallowing. She reports that she likes Glucerna shakes and that she is drinking ~2 per day. Anthropometrics:Height: 5' 6\" (167.6 cm),  Weight: 99.8 kg (220 lb), Weight Source: unknown, Body mass index is 35.51 kg/(m^2). BMI class of overweight for age >71. Macronutrient needs: EER:  9032-1120 kcal /day (13-18 kcal/kg listed BW) EPR:  59-77 grams protein/day (1-1.3 grams/kg IBW) Intake/Comparative Standards: Average intake based on the past 7 days/6 recorded meal intakes: 28%. This potentially meets ~43% of estimated kcal needs and ~43% of estimated protein needs. Intake of Glucerna 2 times daily provides an additional 440 kcals and 18 grams of protein. 
  
Nutrition Diagnosis: Inadequate oral intake related to decreased appetite as evidenced by the patient is unable to meet needs as noted above. 
  
Intervention: 
Meals and snacks: Continue current diet. Nutrition Supplement Therapy: Continue Glucerna TID Nutrition Discharge Plan: Too soon to be determined 
  
Ismael [address], [address] [place] [address],  [phone]

## 2018-09-14 NOTE — DISCHARGE SUMMARY
Hospitalist Discharge Summary     Patient ID:  Dg Bates  [id number]  3 y.o.  1935  Admit date: 9/7/2018 12:57 PM  Discharge date and time: 9/14/2018  Attending: Nuzhat Drew DO  PCP:  Argelia Jenkins MD  Treatment Team: Attending Provider: Nuzhat Drew DO; Student Nurse: Carin Degroot    Principal Diagnosis Hyponatremia   Principal Problem:    Hyponatremia (9/7/2018)    Active Problems:    Persistent atrial fibrillation ([facility] (10/26/2009)      Type II diabetes mellitus, uncontrolled ([facility] (6/20/2012)      Overview: Last HA1c 7.0; check today. Continue with daily self foot exams and annual       eye exams. Urinary tract infection (12/3/2012)      Overview: Associated with chronic indwelling du catheter      Risk for falls (4/11/2018)      Overview: Pt had recent fall; no serious injury, but pt is high risk; referral sent       to  to see if pt could get help getting set up for [place]. Hospital Course:  Please refer to the admission H&P for details of presentation. In summary, the patient is 81 yo female with pmh A Fib on Xarelto, CKD, DM 2, chronic systolic and diastolic HF who presented to ER this afternoon due to progressive weakness, fatigue, loss of appetite x 2 months, symptoms much worse in past few days. Pt found with Na 123, acute on chronic kidney disease, and UTI. She was started on IV fluids and abx. pt denies CP, SOB, abd pain, fevers, nausea/vomiting/diarrhea. Says she \"just didn't feel like eating\". She is recently bed bound with daily health aid in the home. She is now more alert with improved appetite, Na up to 135 and diuretics only partially resumed. She has completed tx for UTI. She is stable for d/c to STR. Significant Diagnostic Studies:     Echo SUMMARY:    -  Left ventricle: Systolic function was normal. Ejection fraction was  estimated to be 55 %. This study was inadequate for the evaluation of   regional  wall motion. Wall thickness was mildly increased. -  Right ventricle: The ventricle was moderately dilated. Systolic function   was  reduced. There was moderate pulmonary artery hypertension.    -  Left atrium: The atrium was moderately to markedly dilated. -  Right atrium: The atrium was moderately dilated. -  Mitral valve: There was mild regurgitation. -  Tricuspid valve: There was moderate regurgitation. Labs: Results:       Chemistry Recent Labs      09/13/18   0942   GLU  227*   NA  135*   K  4.2   CL  98   CO2  28   BUN  72*   CREA  1.33*   CA  10.4   AGAP  9      CBC w/Diff No results for input(s): WBC, RBC, HGB, HCT, PLT, GRANS, LYMPH, EOS, HGBEXT, HCTEXT, PLTEXT in the last 72 hours. Cardiac Enzymes No results for input(s): CPK, CKND1, FLEX in the last 72 hours. No lab exists for component: CKRMB, TROIP   Coagulation No results for input(s): PTP, INR, APTT in the last 72 hours. No lab exists for component: INREXT    Lipid Panel Lab Results   Component Value Date/Time    Cholesterol, total 128 07/18/2017 02:54 PM    HDL Cholesterol 56 07/18/2017 02:54 PM    LDL, calculated 46 07/18/2017 02:54 PM    VLDL, calculated 26 07/18/2017 02:54 PM    Triglyceride 129 07/18/2017 02:54 PM    CHOL/HDL Ratio 1.8 08/09/2013 06:13 AM      BNP No results for input(s): BNPP in the last 72 hours. Liver Enzymes No results for input(s): TP, ALB, TBIL, AP, SGOT, GPT in the last 72 hours.     No lab exists for component: DBIL   Thyroid Studies Lab Results   Component Value Date/Time    TSH 2.730 04/11/2018 04:20 PM            Discharge Exam:  Visit Vitals    /63 (BP 1 Location: Right arm, BP Patient Position: At rest)    Pulse 87    Temp 98.4 °F (36.9 °C)    Resp 22    Ht 5' 6\" (1.676 m)    Wt 99.8 kg (220 lb)    SpO2 93%  Comment: RA    BMI 35.51 kg/m2     General appearance: alert, cooperative, no distress, appears stated age  Lungs: clear to auscultation bilaterally  Heart: irreg irreg, S1, S2 normal, no murmur, click, rub or gallop  Abdomen: soft, non-tender. Bowel sounds normal. No masses,  no organomegaly  Extremities: no cyanosis or edema  Neurologic: Grossly normal    Disposition: STR  Discharge Condition: stable  Patient Instructions:   Current Discharge Medication List      START taking these medications    Details   rivaroxaban (XARELTO) 15 mg tab tablet Take 1 Tab by mouth daily (with breakfast). Qty: 30 Tab, Refills: 0         CONTINUE these medications which have CHANGED    Details   furosemide (LASIX) 20 mg tablet Take 1 Tab by mouth daily. Qty: 30 Tab, Refills: 0         CONTINUE these medications which have NOT CHANGED    Details   pantoprazole (PROTONIX) 40 mg tablet TAKE 1 TABLET BY MOUTH DAILY (BEFORE BREAKFAST). Qty: 90 Tab, Refills: 0      gabapentin (NEURONTIN) 300 mg capsule Take 1 Cap by mouth three (3) times daily. Qty: 90 Cap, Refills: 1    Associated Diagnoses: Pain in both lower extremities      potassium chloride SA (MICRO-K) 10 mEq capsule TAKE 1 CAPSULE BY MOUTH DAILY. INDICATIONS: HYPOKALEMIA  Qty: 90 Cap, Refills: 0      carvedilol (COREG) 12.5 mg tablet TAKE 1 TABLET BY MOUTH TWO (2) TIMES DAILY (WITH MEALS). Qty: 180 Tab, Refills: 3      ascorbic acid, vitamin C, (VITAMIN C) 500 mg tablet Take 500 mg by mouth daily. glimepiride (AMARYL) 1 mg tablet TAKE 1 TAB BY MOUTH DAILY (BEFORE BREAKFAST). Qty: 30 Tab, Refills: 3      cholecalciferol (VITAMIN D3) 1,000 unit cap Take 1 Cap by mouth daily. Qty: 90 Cap, Refills: 4      isosorbide mononitrate ER (IMDUR) 30 mg tablet Take 1 Tab by mouth daily. Qty: 90 Tab, Refills: 4      aspirin 81 mg tablet Take 81 mg by mouth daily.            STOP taking these medications       traMADol (ULTRAM) 50 mg tablet Comments:   Reason for Stopping:         rivaroxaban (XARELTO) 15 mg (42)- 20 mg (9) DsPk Comments:   Reason for Stopping:         torsemide (DEMADEX) 20 mg tablet Comments:   Reason for Stopping:         metOLazone (ZAROXOLYN) 5 mg tablet Comments:   Reason for Stopping:               Activity: Regular, as tolerated  Diet: DIET CARDIAC Regular  DIET NUTRITIONAL SUPPLEMENTS All Meals; Glucerna    Follow-up    PCP in 1 week  Cardiology in 1-2 weeks      Follow-up Information     Follow up With Details Comments 37 Rita Verduzco MD   [address] [facility] Jostin Simmons               Time spent to discharge patient greater than 30 minutes  Signed:  Prashant Cuellar NP  9/14/2018  12:59 PM

## 2018-09-14 NOTE — PROGRESS NOTES
Pt turned q 2 hours during PM shift. Pt brief changed PRN. New IV obtained on RFA due to patient pulling previous IV out. Pt given Tramadol during PM shift. Pt denies discomfort at this time. Pt has continuous swelling to BLE. Pt had elevated BGL during PM shift. Coverage provided. NO further issues at this time. Will continue to monitor/assess.

## 2018-09-14 NOTE — PROGRESS NOTES
[facility] was called approximately [phone]. Pt was still here with a previously scheduled pick for 3:30pm per writer's best understanding. RN called the number listed for [facility] on [facility] directory. The staff of [facility] did not have the  recorded. However, they set the  at once. Family was updated.

## 2018-09-14 NOTE — PROGRESS NOTES
RN called to room (RN was charting right outside the room actually) when  stuck his head outside the door reporting the pt had chest pain. VS taken at once. 93% /63 RUE, RR 22, HR 87. Reporting the pain to be sharp, did not radiate. Denied chest pressure. Reported this had just started. NP was rounding and came into room. New orders of portable CXR, stat EKG and troponin stat were entered by RN. Pt did not appear distressed, no apparent SOB. Able to speak clearly, no apparent sweating. Denied radiating pain.

## 2018-09-14 NOTE — PROGRESS NOTES
[facility] called and asked how long patient has been bed bound and if patient would discharge on IV ABT. Spoke with NP and faxed information to [address] Morrell at [phone].

## 2018-09-14 NOTE — PROGRESS NOTES
Call received from [facility] stating patient has been authorized for SNF. NP notified.

## 2018-09-14 NOTE — PROGRESS NOTES
TRANSFER - OUT REPORT: 
 
Verbal report given to [facility] at [address] on [address] [place] [address],[address]  being transferred to [facility] for routine progression of care Report consisted of patients Situation, Background, Assessment and  
Recommendations(SBAR). Information from the following report(s) SBAR was reviewed with the receiving nurse. Opportunity for questions and clarification was provided. Patient transported with: 
Baljit Marker Riley Diazs

## 2018-09-15 PROCEDURE — 3331090001 HH PPS REVENUE CREDIT

## 2018-09-15 PROCEDURE — 3331090002 HH PPS REVENUE DEBIT

## 2018-09-16 PROCEDURE — 3331090002 HH PPS REVENUE DEBIT

## 2018-09-16 PROCEDURE — 3331090001 HH PPS REVENUE CREDIT

## 2018-09-17 PROCEDURE — 3331090001 HH PPS REVENUE CREDIT

## 2018-09-17 PROCEDURE — 3331090002 HH PPS REVENUE DEBIT

## 2018-09-18 PROCEDURE — 3331090001 HH PPS REVENUE CREDIT

## 2018-09-18 PROCEDURE — 3331090002 HH PPS REVENUE DEBIT

## 2018-09-19 PROCEDURE — 3331090001 HH PPS REVENUE CREDIT

## 2018-09-19 PROCEDURE — 3331090002 HH PPS REVENUE DEBIT

## 2018-09-20 PROCEDURE — 3331090001 HH PPS REVENUE CREDIT

## 2018-09-20 PROCEDURE — 3331090002 HH PPS REVENUE DEBIT

## 2018-09-21 PROCEDURE — 3331090001 HH PPS REVENUE CREDIT

## 2018-09-21 PROCEDURE — 3331090002 HH PPS REVENUE DEBIT

## 2018-09-22 PROCEDURE — 3331090002 HH PPS REVENUE DEBIT

## 2018-09-22 PROCEDURE — 3331090001 HH PPS REVENUE CREDIT

## 2018-09-23 PROCEDURE — 3331090002 HH PPS REVENUE DEBIT

## 2018-09-23 PROCEDURE — 3331090001 HH PPS REVENUE CREDIT

## 2018-09-24 PROCEDURE — 3331090002 HH PPS REVENUE DEBIT

## 2018-09-24 PROCEDURE — 3331090001 HH PPS REVENUE CREDIT

## 2018-09-25 PROCEDURE — 3331090002 HH PPS REVENUE DEBIT

## 2018-09-25 PROCEDURE — 3331090001 HH PPS REVENUE CREDIT

## 2018-09-26 PROCEDURE — 3331090001 HH PPS REVENUE CREDIT

## 2018-09-26 PROCEDURE — 3331090002 HH PPS REVENUE DEBIT

## 2018-09-27 ENCOUNTER — HOSPITAL ENCOUNTER (OUTPATIENT)
Dept: INFUSION THERAPY | Age: 83
Discharge: HOME | End: 2018-09-27
Payer: MEDICARE

## 2018-09-27 ENCOUNTER — HOSPITAL ENCOUNTER (OUTPATIENT)
Dept: LAB | Age: 83
Discharge: HOME | End: 2018-09-27

## 2018-09-27 VITALS
DIASTOLIC BLOOD PRESSURE: 81 MMHG | OXYGEN SATURATION: 100 % | HEART RATE: 87 BPM | RESPIRATION RATE: 18 BRPM | TEMPERATURE: 98.8 F | SYSTOLIC BLOOD PRESSURE: 133 MMHG

## 2018-09-27 LAB
ERYTHROCYTE [DISTWIDTH] IN BLOOD BY AUTOMATED COUNT: 15.4 %
HCT VFR BLD AUTO: 27.3 % (ref 35.8–46.3)
HGB BLD-MCNC: 8.1 G/DL (ref 11.7–15.4)
MCH RBC QN AUTO: 26.6 PG (ref 26.1–32.9)
MCHC RBC AUTO-ENTMCNC: 29.7 G/DL (ref 31.4–35)
MCV RBC AUTO: 89.8 FL (ref 79.6–97.8)
NRBC # BLD: 0 K/UL (ref 0–0.2)
PLATELET # BLD AUTO: 189 K/UL (ref 150–450)
PMV BLD AUTO: 11.9 FL (ref 9.4–12.3)
RBC # BLD AUTO: 3.04 M/UL (ref 4.05–5.2)
WBC # BLD AUTO: 6.4 K/UL (ref 4.3–11.1)

## 2018-09-27 PROCEDURE — 77030018667 ADMN ST IV BLD FENW -A

## 2018-09-27 PROCEDURE — 74011250636 HC RX REV CODE- 250/636

## 2018-09-27 PROCEDURE — P9016 RBC LEUKOCYTES REDUCED: HCPCS

## 2018-09-27 PROCEDURE — 86900 BLOOD TYPING SEROLOGIC ABO: CPT

## 2018-09-27 PROCEDURE — 3331090001 HH PPS REVENUE CREDIT

## 2018-09-27 PROCEDURE — 36430 TRANSFUSION BLD/BLD COMPNT: CPT

## 2018-09-27 PROCEDURE — 3331090002 HH PPS REVENUE DEBIT

## 2018-09-27 PROCEDURE — 86920 COMPATIBILITY TEST SPIN: CPT

## 2018-09-27 PROCEDURE — 85027 COMPLETE CBC AUTOMATED: CPT

## 2018-09-27 RX ORDER — SODIUM CHLORIDE 9 MG/ML
250 INJECTION, SOLUTION INTRAVENOUS AS NEEDED
Status: DISCONTINUED | OUTPATIENT
Start: 2018-09-27 | End: 2018-10-01 | Stop reason: HOSPADM

## 2018-09-27 RX ADMIN — SODIUM CHLORIDE 250 ML: 900 INJECTION, SOLUTION INTRAVENOUS at 11:45

## 2018-09-27 NOTE — PROGRESS NOTES
Arrived to the [facility]. One unit prbc's completed. Patient tolerated well. Any issues or concerns during appointment: No 
Discharged via stretcher by Levine, Susan. \[facility].\"" transport service to [address] [place] [address].

## 2018-09-28 LAB
ABO + RH BLD: NORMAL
BLD PROD TYP BPU: NORMAL
BLOOD GROUP ANTIBODIES SERPL: NORMAL
BPU ID: NORMAL
CROSSMATCH RESULT,%XM: NORMAL
SPECIMEN EXP DATE BLD: NORMAL
STATUS OF UNIT,%ST: NORMAL
UNIT DIVISION, %UDIV: 0

## 2018-09-28 PROCEDURE — 3331090001 HH PPS REVENUE CREDIT

## 2018-09-28 PROCEDURE — 3331090002 HH PPS REVENUE DEBIT

## 2018-09-29 PROCEDURE — 3331090002 HH PPS REVENUE DEBIT

## 2018-09-29 PROCEDURE — 3331090001 HH PPS REVENUE CREDIT

## 2018-09-30 PROCEDURE — 3331090001 HH PPS REVENUE CREDIT

## 2018-09-30 PROCEDURE — 3331090002 HH PPS REVENUE DEBIT

## 2018-10-01 PROCEDURE — 3331090002 HH PPS REVENUE DEBIT

## 2018-10-01 PROCEDURE — 3331090001 HH PPS REVENUE CREDIT

## 2018-10-02 PROCEDURE — 3331090002 HH PPS REVENUE DEBIT

## 2018-10-02 PROCEDURE — 3331090001 HH PPS REVENUE CREDIT

## 2018-10-03 PROCEDURE — 3331090001 HH PPS REVENUE CREDIT

## 2018-10-03 PROCEDURE — 3331090002 HH PPS REVENUE DEBIT

## 2018-10-04 PROCEDURE — 3331090002 HH PPS REVENUE DEBIT

## 2018-10-04 PROCEDURE — 3331090001 HH PPS REVENUE CREDIT

## 2018-10-05 PROCEDURE — 3331090002 HH PPS REVENUE DEBIT

## 2018-10-05 PROCEDURE — 3331090001 HH PPS REVENUE CREDIT

## 2018-10-06 PROCEDURE — 3331090001 HH PPS REVENUE CREDIT

## 2018-10-06 PROCEDURE — 3331090002 HH PPS REVENUE DEBIT

## 2018-10-07 PROCEDURE — 3331090002 HH PPS REVENUE DEBIT

## 2018-10-07 PROCEDURE — 3331090001 HH PPS REVENUE CREDIT

## 2018-10-08 PROCEDURE — 3331090002 HH PPS REVENUE DEBIT

## 2018-10-08 PROCEDURE — 3331090001 HH PPS REVENUE CREDIT

## 2018-10-09 PROCEDURE — 3331090001 HH PPS REVENUE CREDIT

## 2018-10-09 PROCEDURE — 3331090002 HH PPS REVENUE DEBIT

## 2018-10-10 PROCEDURE — 3331090001 HH PPS REVENUE CREDIT

## 2018-10-10 PROCEDURE — 3331090002 HH PPS REVENUE DEBIT

## 2018-10-11 PROCEDURE — 3331090002 HH PPS REVENUE DEBIT

## 2018-10-11 PROCEDURE — 3331090001 HH PPS REVENUE CREDIT

## 2018-10-12 PROCEDURE — 3331090001 HH PPS REVENUE CREDIT

## 2018-10-12 PROCEDURE — 3331090002 HH PPS REVENUE DEBIT

## 2018-10-13 PROCEDURE — 3331090002 HH PPS REVENUE DEBIT

## 2018-10-13 PROCEDURE — 3331090001 HH PPS REVENUE CREDIT

## 2018-10-14 PROCEDURE — 3331090002 HH PPS REVENUE DEBIT

## 2018-10-14 PROCEDURE — 3331090001 HH PPS REVENUE CREDIT

## 2018-10-15 PROCEDURE — 3331090001 HH PPS REVENUE CREDIT

## 2018-10-15 PROCEDURE — 3331090002 HH PPS REVENUE DEBIT

## 2018-10-16 PROCEDURE — 3331090002 HH PPS REVENUE DEBIT

## 2018-10-16 PROCEDURE — 3331090001 HH PPS REVENUE CREDIT

## 2018-10-17 PROCEDURE — 3331090002 HH PPS REVENUE DEBIT

## 2018-10-17 PROCEDURE — 3331090001 HH PPS REVENUE CREDIT

## 2018-10-18 PROCEDURE — 3331090001 HH PPS REVENUE CREDIT

## 2018-10-18 PROCEDURE — 3331090002 HH PPS REVENUE DEBIT

## 2018-10-19 PROCEDURE — 3331090001 HH PPS REVENUE CREDIT

## 2018-10-19 PROCEDURE — 3331090003 HH PPS REVENUE ADJ

## 2018-10-19 PROCEDURE — 3331090002 HH PPS REVENUE DEBIT

## 2018-10-22 PROBLEM — R09.02 HYPOXIA: Status: ACTIVE | Noted: 2018-10-22

## 2018-11-14 ENCOUNTER — HOME CARE VISIT (OUTPATIENT)
Dept: HOME HEALTH SERVICES | Facility: HOME HEALTH | Age: 83
End: 2018-11-14

## 2018-11-23 ENCOUNTER — APPOINTMENT (OUTPATIENT)
Dept: GENERAL RADIOLOGY | Age: 83
DRG: 690 | End: 2018-11-23
Attending: EMERGENCY MEDICINE
Payer: OTHER MISCELLANEOUS

## 2018-11-23 ENCOUNTER — HOSPITAL ENCOUNTER (INPATIENT)
Age: 83
LOS: 5 days | Discharge: HOSPICE | DRG: 690 | End: 2018-11-28
Attending: EMERGENCY MEDICINE | Admitting: INTERNAL MEDICINE
Payer: OTHER MISCELLANEOUS

## 2018-11-23 DIAGNOSIS — E86.0 DEHYDRATION: ICD-10-CM

## 2018-11-23 DIAGNOSIS — R31.0 GROSS HEMATURIA: Primary | ICD-10-CM

## 2018-11-23 DIAGNOSIS — E87.1 HYPONATREMIA: ICD-10-CM

## 2018-11-23 DIAGNOSIS — N17.9 ACUTE KIDNEY INJURY (HCC): ICD-10-CM

## 2018-11-23 PROBLEM — N30.01 ACUTE CYSTITIS WITH HEMATURIA: Status: ACTIVE | Noted: 2018-11-23

## 2018-11-23 PROBLEM — I89.0 LYMPHEDEMA OF BOTH LOWER EXTREMITIES: Chronic | Status: ACTIVE | Noted: 2018-04-11

## 2018-11-23 PROBLEM — D62 ACUTE BLOOD LOSS ANEMIA: Status: ACTIVE | Noted: 2018-11-23

## 2018-11-23 PROBLEM — R53.81 DEBILITY: Chronic | Status: ACTIVE | Noted: 2018-08-21

## 2018-11-23 PROBLEM — Z74.01 BEDBOUND: Chronic | Status: ACTIVE | Noted: 2018-08-21

## 2018-11-23 PROBLEM — R31.9 HEMATURIA: Status: ACTIVE | Noted: 2018-11-23

## 2018-11-23 LAB
ABO + RH BLD: NORMAL
ALBUMIN SERPL-MCNC: 3.4 G/DL (ref 3.2–4.6)
ALBUMIN/GLOB SERPL: 0.6 {RATIO} (ref 1.2–3.5)
ALP SERPL-CCNC: 84 U/L (ref 50–136)
ALT SERPL-CCNC: 9 U/L (ref 12–65)
ANION GAP SERPL CALC-SCNC: 9 MMOL/L (ref 7–16)
APTT PPP: 58.4 SEC (ref 23.2–35.3)
AST SERPL-CCNC: 15 U/L (ref 15–37)
BASOPHILS # BLD: 0 K/UL (ref 0–0.2)
BASOPHILS NFR BLD: 0 % (ref 0–2)
BILIRUB SERPL-MCNC: 0.4 MG/DL (ref 0.2–1.1)
BLOOD GROUP ANTIBODIES SERPL: NORMAL
BUN SERPL-MCNC: 222 MG/DL (ref 8–23)
CALCIUM SERPL-MCNC: 10.3 MG/DL (ref 8.3–10.4)
CHLORIDE SERPL-SCNC: 80 MMOL/L (ref 98–107)
CO2 SERPL-SCNC: 31 MMOL/L (ref 21–32)
CREAT SERPL-MCNC: 2.28 MG/DL (ref 0.6–1)
DIFFERENTIAL METHOD BLD: ABNORMAL
EOSINOPHIL # BLD: 0 K/UL (ref 0–0.8)
EOSINOPHIL NFR BLD: 1 % (ref 0.5–7.8)
ERYTHROCYTE [DISTWIDTH] IN BLOOD BY AUTOMATED COUNT: 14.2 %
GLOBULIN SER CALC-MCNC: 5.3 G/DL (ref 2.3–3.5)
GLUCOSE SERPL-MCNC: 282 MG/DL (ref 65–100)
HCT VFR BLD AUTO: 28.7 % (ref 35.8–46.3)
HGB BLD-MCNC: 9.2 G/DL (ref 11.7–15.4)
IMM GRANULOCYTES # BLD: 0 K/UL (ref 0–0.5)
IMM GRANULOCYTES NFR BLD AUTO: 1 % (ref 0–5)
INR PPP: 2.2
LYMPHOCYTES # BLD: 0.5 K/UL (ref 0.5–4.6)
LYMPHOCYTES NFR BLD: 9 % (ref 13–44)
MCH RBC QN AUTO: 26.4 PG (ref 26.1–32.9)
MCHC RBC AUTO-ENTMCNC: 32.1 G/DL (ref 31.4–35)
MCV RBC AUTO: 82.5 FL (ref 79.6–97.8)
MONOCYTES # BLD: 0.6 K/UL (ref 0.1–1.3)
MONOCYTES NFR BLD: 10 % (ref 4–12)
NEUTS SEG # BLD: 5.1 K/UL (ref 1.7–8.2)
NEUTS SEG NFR BLD: 81 % (ref 43–78)
NRBC # BLD: 0 K/UL (ref 0–0.2)
PLATELET # BLD AUTO: 129 K/UL (ref 150–450)
PMV BLD AUTO: 13.8 FL (ref 9.4–12.3)
POTASSIUM SERPL-SCNC: 3.8 MMOL/L (ref 3.5–5.1)
PROT SERPL-MCNC: 8.7 G/DL (ref 6.3–8.2)
PROTHROMBIN TIME: 23.7 SEC (ref 11.5–14.5)
RBC # BLD AUTO: 3.48 M/UL (ref 4.05–5.2)
SODIUM SERPL-SCNC: 120 MMOL/L (ref 136–145)
SPECIMEN EXP DATE BLD: NORMAL
WBC # BLD AUTO: 6.4 K/UL (ref 4.3–11.1)

## 2018-11-23 PROCEDURE — 99285 EMERGENCY DEPT VISIT HI MDM: CPT | Performed by: EMERGENCY MEDICINE

## 2018-11-23 PROCEDURE — 85025 COMPLETE CBC W/AUTO DIFF WBC: CPT

## 2018-11-23 PROCEDURE — 87086 URINE CULTURE/COLONY COUNT: CPT

## 2018-11-23 PROCEDURE — 81003 URINALYSIS AUTO W/O SCOPE: CPT | Performed by: EMERGENCY MEDICINE

## 2018-11-23 PROCEDURE — 87088 URINE BACTERIA CULTURE: CPT

## 2018-11-23 PROCEDURE — 74011250636 HC RX REV CODE- 250/636: Performed by: EMERGENCY MEDICINE

## 2018-11-23 PROCEDURE — 93005 ELECTROCARDIOGRAM TRACING: CPT | Performed by: EMERGENCY MEDICINE

## 2018-11-23 PROCEDURE — 86901 BLOOD TYPING SEROLOGIC RH(D): CPT

## 2018-11-23 PROCEDURE — 80053 COMPREHEN METABOLIC PANEL: CPT

## 2018-11-23 PROCEDURE — 65660000000 HC RM CCU STEPDOWN

## 2018-11-23 PROCEDURE — 87186 SC STD MICRODIL/AGAR DIL: CPT

## 2018-11-23 PROCEDURE — 71045 X-RAY EXAM CHEST 1 VIEW: CPT

## 2018-11-23 PROCEDURE — 85610 PROTHROMBIN TIME: CPT

## 2018-11-23 PROCEDURE — 77030011943

## 2018-11-23 PROCEDURE — 51701 INSERT BLADDER CATHETER: CPT | Performed by: EMERGENCY MEDICINE

## 2018-11-23 PROCEDURE — 77030005546 HC CATH URETH FOL 3W BARD -A

## 2018-11-23 PROCEDURE — 81001 URINALYSIS AUTO W/SCOPE: CPT

## 2018-11-23 PROCEDURE — 85730 THROMBOPLASTIN TIME PARTIAL: CPT

## 2018-11-23 RX ORDER — SODIUM CHLORIDE 9 MG/ML
100 INJECTION, SOLUTION INTRAVENOUS CONTINUOUS
Status: DISCONTINUED | OUTPATIENT
Start: 2018-11-23 | End: 2018-11-23 | Stop reason: SDUPTHER

## 2018-11-23 RX ORDER — SODIUM CHLORIDE 0.9 % (FLUSH) 0.9 %
5-10 SYRINGE (ML) INJECTION AS NEEDED
Status: DISCONTINUED | OUTPATIENT
Start: 2018-11-23 | End: 2018-11-28 | Stop reason: HOSPADM

## 2018-11-23 RX ORDER — LORAZEPAM 0.5 MG/1
0.5 TABLET ORAL
Status: DISCONTINUED | OUTPATIENT
Start: 2018-11-23 | End: 2018-11-25

## 2018-11-23 RX ORDER — ZOLPIDEM TARTRATE 5 MG/1
5 TABLET ORAL
Status: DISCONTINUED | OUTPATIENT
Start: 2018-11-23 | End: 2018-11-28 | Stop reason: HOSPADM

## 2018-11-23 RX ORDER — SODIUM CHLORIDE 0.9 % (FLUSH) 0.9 %
5-10 SYRINGE (ML) INJECTION EVERY 8 HOURS
Status: DISCONTINUED | OUTPATIENT
Start: 2018-11-23 | End: 2018-11-23 | Stop reason: SDUPTHER

## 2018-11-23 RX ORDER — GABAPENTIN 100 MG/1
300 CAPSULE ORAL 3 TIMES DAILY
Status: DISCONTINUED | OUTPATIENT
Start: 2018-11-23 | End: 2018-11-28 | Stop reason: HOSPADM

## 2018-11-23 RX ORDER — CARVEDILOL 12.5 MG/1
12.5 TABLET ORAL 2 TIMES DAILY WITH MEALS
Status: DISCONTINUED | OUTPATIENT
Start: 2018-11-24 | End: 2018-11-28 | Stop reason: HOSPADM

## 2018-11-23 RX ORDER — DIPHENHYDRAMINE HCL 25 MG
25 CAPSULE ORAL
Status: DISCONTINUED | OUTPATIENT
Start: 2018-11-23 | End: 2018-11-25

## 2018-11-23 RX ORDER — SODIUM CHLORIDE 0.9 % (FLUSH) 0.9 %
5-10 SYRINGE (ML) INJECTION EVERY 8 HOURS
Status: DISCONTINUED | OUTPATIENT
Start: 2018-11-23 | End: 2018-11-28 | Stop reason: HOSPADM

## 2018-11-23 RX ORDER — PANTOPRAZOLE SODIUM 40 MG/1
40 TABLET, DELAYED RELEASE ORAL
Status: DISCONTINUED | OUTPATIENT
Start: 2018-11-24 | End: 2018-11-25

## 2018-11-23 RX ORDER — ONDANSETRON 2 MG/ML
4 INJECTION INTRAMUSCULAR; INTRAVENOUS
Status: DISCONTINUED | OUTPATIENT
Start: 2018-11-23 | End: 2018-11-28 | Stop reason: HOSPADM

## 2018-11-23 RX ORDER — HYDROCODONE BITARTRATE AND ACETAMINOPHEN 5; 325 MG/1; MG/1
1 TABLET ORAL
Status: DISCONTINUED | OUTPATIENT
Start: 2018-11-23 | End: 2018-11-25

## 2018-11-23 RX ORDER — SODIUM CHLORIDE 0.9 % (FLUSH) 0.9 %
5-10 SYRINGE (ML) INJECTION AS NEEDED
Status: DISCONTINUED | OUTPATIENT
Start: 2018-11-23 | End: 2018-11-23 | Stop reason: SDUPTHER

## 2018-11-23 RX ORDER — NALOXONE HYDROCHLORIDE 0.4 MG/ML
0.4 INJECTION, SOLUTION INTRAMUSCULAR; INTRAVENOUS; SUBCUTANEOUS AS NEEDED
Status: DISCONTINUED | OUTPATIENT
Start: 2018-11-23 | End: 2018-11-28 | Stop reason: HOSPADM

## 2018-11-23 RX ORDER — HYDRALAZINE HYDROCHLORIDE 20 MG/ML
20 INJECTION INTRAMUSCULAR; INTRAVENOUS
Status: DISCONTINUED | OUTPATIENT
Start: 2018-11-23 | End: 2018-11-28 | Stop reason: HOSPADM

## 2018-11-23 RX ORDER — AMOXICILLIN 250 MG
2 CAPSULE ORAL
Status: DISCONTINUED | OUTPATIENT
Start: 2018-11-23 | End: 2018-11-28 | Stop reason: HOSPADM

## 2018-11-23 RX ORDER — MAG HYDROX/ALUMINUM HYD/SIMETH 200-200-20
30 SUSPENSION, ORAL (FINAL DOSE FORM) ORAL
Status: DISCONTINUED | OUTPATIENT
Start: 2018-11-23 | End: 2018-11-28 | Stop reason: HOSPADM

## 2018-11-23 RX ORDER — ISOSORBIDE MONONITRATE 30 MG/1
30 TABLET, EXTENDED RELEASE ORAL DAILY
Status: DISCONTINUED | OUTPATIENT
Start: 2018-11-24 | End: 2018-11-28 | Stop reason: HOSPADM

## 2018-11-23 RX ORDER — HALOPERIDOL 5 MG/ML
2 INJECTION INTRAMUSCULAR
Status: DISCONTINUED | OUTPATIENT
Start: 2018-11-23 | End: 2018-11-25

## 2018-11-23 RX ORDER — ACETAMINOPHEN 325 MG/1
650 TABLET ORAL
Status: DISCONTINUED | OUTPATIENT
Start: 2018-11-23 | End: 2018-11-28 | Stop reason: HOSPADM

## 2018-11-23 RX ORDER — SODIUM CHLORIDE 9 MG/ML
50 INJECTION, SOLUTION INTRAVENOUS CONTINUOUS
Status: DISCONTINUED | OUTPATIENT
Start: 2018-11-23 | End: 2018-11-25

## 2018-11-23 RX ADMIN — SODIUM CHLORIDE 200 ML/HR: 900 INJECTION, SOLUTION INTRAVENOUS at 22:35

## 2018-11-24 LAB
ANION GAP SERPL CALC-SCNC: 12 MMOL/L (ref 7–16)
APPEARANCE UR: ABNORMAL
ATRIAL RATE: 100 BPM
BACTERIA URNS QL MICRO: ABNORMAL /HPF
BASOPHILS # BLD: 0 K/UL (ref 0–0.2)
BASOPHILS NFR BLD: 0 % (ref 0–2)
BILIRUB UR QL: ABNORMAL
BUN SERPL-MCNC: 226 MG/DL (ref 8–23)
CALCIUM SERPL-MCNC: 10 MG/DL (ref 8.3–10.4)
CALCULATED R AXIS, ECG10: 90 DEGREES
CALCULATED T AXIS, ECG11: -6 DEGREES
CHLORIDE SERPL-SCNC: 82 MMOL/L (ref 98–107)
CO2 SERPL-SCNC: 31 MMOL/L (ref 21–32)
COLOR UR: ABNORMAL
CREAT SERPL-MCNC: 2.14 MG/DL (ref 0.6–1)
DIAGNOSIS, 93000: NORMAL
DIFFERENTIAL METHOD BLD: ABNORMAL
EOSINOPHIL # BLD: 0 K/UL (ref 0–0.8)
EOSINOPHIL NFR BLD: 1 % (ref 0.5–7.8)
EPI CELLS #/AREA URNS HPF: ABNORMAL /HPF
ERYTHROCYTE [DISTWIDTH] IN BLOOD BY AUTOMATED COUNT: 14.2 %
GLUCOSE SERPL-MCNC: 219 MG/DL (ref 65–100)
GLUCOSE UR STRIP.AUTO-MCNC: NEGATIVE MG/DL
HCT VFR BLD AUTO: 27.4 % (ref 35.8–46.3)
HGB BLD-MCNC: 8.6 G/DL (ref 11.7–15.4)
HGB UR QL STRIP: ABNORMAL
IMM GRANULOCYTES # BLD: 0 K/UL (ref 0–0.5)
IMM GRANULOCYTES NFR BLD AUTO: 1 % (ref 0–5)
KETONES UR QL STRIP.AUTO: 15 MG/DL
LEUKOCYTE ESTERASE UR QL STRIP.AUTO: ABNORMAL
LYMPHOCYTES # BLD: 0.6 K/UL (ref 0.5–4.6)
LYMPHOCYTES NFR BLD: 10 % (ref 13–44)
MCH RBC QN AUTO: 26.4 PG (ref 26.1–32.9)
MCHC RBC AUTO-ENTMCNC: 31.4 G/DL (ref 31.4–35)
MCV RBC AUTO: 84 FL (ref 79.6–97.8)
MONOCYTES # BLD: 0.7 K/UL (ref 0.1–1.3)
MONOCYTES NFR BLD: 13 % (ref 4–12)
NEUTS SEG # BLD: 4 K/UL (ref 1.7–8.2)
NEUTS SEG NFR BLD: 75 % (ref 43–78)
NITRITE UR QL STRIP.AUTO: POSITIVE
NRBC # BLD: 0 K/UL (ref 0–0.2)
OTHER OBSERVATIONS,UCOM: ABNORMAL
PH UR STRIP: 7 [PH] (ref 5–9)
PLATELET # BLD AUTO: 116 K/UL (ref 150–450)
PMV BLD AUTO: 13.4 FL (ref 9.4–12.3)
POTASSIUM SERPL-SCNC: 3.2 MMOL/L (ref 3.5–5.1)
PROT UR STRIP-MCNC: 300 MG/DL
Q-T INTERVAL, ECG07: 458 MS
QRS DURATION, ECG06: 184 MS
QTC CALCULATION (BEZET), ECG08: 483 MS
RBC # BLD AUTO: 3.26 M/UL (ref 4.05–5.2)
RBC #/AREA URNS HPF: >100 /HPF
SODIUM SERPL-SCNC: 125 MMOL/L (ref 136–145)
SP GR UR REFRACTOMETRY: 1.01 (ref 1–1.02)
TROPONIN I SERPL-MCNC: 0.03 NG/ML (ref 0.02–0.05)
TROPONIN I SERPL-MCNC: 0.03 NG/ML (ref 0.02–0.05)
UROBILINOGEN UR QL STRIP.AUTO: 1 EU/DL (ref 0.2–1)
VENTRICULAR RATE, ECG03: 67 BPM
WBC # BLD AUTO: 5.4 K/UL (ref 4.3–11.1)
WBC URNS QL MICRO: ABNORMAL /HPF
YEAST URNS QL MICRO: ABNORMAL

## 2018-11-24 PROCEDURE — 77030032490 HC SLV COMPR SCD KNE COVD -B

## 2018-11-24 PROCEDURE — 65660000000 HC RM CCU STEPDOWN

## 2018-11-24 PROCEDURE — 74011250636 HC RX REV CODE- 250/636: Performed by: INTERNAL MEDICINE

## 2018-11-24 PROCEDURE — 74011000258 HC RX REV CODE- 258: Performed by: INTERNAL MEDICINE

## 2018-11-24 PROCEDURE — 36415 COLL VENOUS BLD VENIPUNCTURE: CPT

## 2018-11-24 PROCEDURE — 77030010545

## 2018-11-24 PROCEDURE — 74011250637 HC RX REV CODE- 250/637: Performed by: INTERNAL MEDICINE

## 2018-11-24 PROCEDURE — 77030005546 HC CATH URETH FOL 3W BARD -A

## 2018-11-24 PROCEDURE — 84484 ASSAY OF TROPONIN QUANT: CPT

## 2018-11-24 PROCEDURE — 77030020253 HC SOL INJ D545NS .05 DEX .45 SAL

## 2018-11-24 PROCEDURE — 77030020263 HC SOL INJ SOD CL0.9% LFCR 1000ML

## 2018-11-24 PROCEDURE — 85025 COMPLETE CBC W/AUTO DIFF WBC: CPT

## 2018-11-24 PROCEDURE — 77030018836 HC SOL IRR NACL ICUM -A

## 2018-11-24 PROCEDURE — 80048 BASIC METABOLIC PNL TOTAL CA: CPT

## 2018-11-24 RX ORDER — POTASSIUM CHLORIDE 20 MEQ/1
40 TABLET, EXTENDED RELEASE ORAL
Status: COMPLETED | OUTPATIENT
Start: 2018-11-24 | End: 2018-11-24

## 2018-11-24 RX ADMIN — Medication 10 ML: at 02:11

## 2018-11-24 RX ADMIN — CEFTRIAXONE SODIUM 1 G: 1 INJECTION, POWDER, FOR SOLUTION INTRAMUSCULAR; INTRAVENOUS at 00:07

## 2018-11-24 RX ADMIN — PANTOPRAZOLE SODIUM 40 MG: 40 TABLET, DELAYED RELEASE ORAL at 05:46

## 2018-11-24 RX ADMIN — Medication 10 ML: at 21:56

## 2018-11-24 RX ADMIN — GABAPENTIN 300 MG: 300 CAPSULE ORAL at 09:02

## 2018-11-24 RX ADMIN — SODIUM CHLORIDE 100 ML/HR: 900 INJECTION, SOLUTION INTRAVENOUS at 17:19

## 2018-11-24 RX ADMIN — Medication 10 ML: at 13:57

## 2018-11-24 RX ADMIN — Medication 10 ML: at 05:48

## 2018-11-24 RX ADMIN — SODIUM CHLORIDE 100 ML/HR: 900 INJECTION, SOLUTION INTRAVENOUS at 09:00

## 2018-11-24 RX ADMIN — CEFTRIAXONE SODIUM 1 G: 1 INJECTION, POWDER, FOR SOLUTION INTRAMUSCULAR; INTRAVENOUS at 22:02

## 2018-11-24 RX ADMIN — GABAPENTIN 300 MG: 300 CAPSULE ORAL at 21:55

## 2018-11-24 RX ADMIN — ISOSORBIDE MONONITRATE 30 MG: 30 TABLET, EXTENDED RELEASE ORAL at 09:01

## 2018-11-24 RX ADMIN — GABAPENTIN 300 MG: 300 CAPSULE ORAL at 00:07

## 2018-11-24 RX ADMIN — POTASSIUM CHLORIDE 40 MEQ: 20 TABLET, EXTENDED RELEASE ORAL at 20:30

## 2018-11-24 RX ADMIN — GABAPENTIN 300 MG: 300 CAPSULE ORAL at 17:10

## 2018-11-24 RX ADMIN — SODIUM CHLORIDE 100 ML/HR: 900 INJECTION, SOLUTION INTRAVENOUS at 02:11

## 2018-11-24 NOTE — PROGRESS NOTES
Progress Note   
2018 Admit Date: 2018  8:20 PM  
NAME: Alvino Short :  1935 MRN:  [id number] Attending: Sarah Mazariegos MD 
PCP:  Myra Anderson MD 
Treatment Team: Attending Provider: Sarah Mazariegos MD; Consulting Provider: Roni Chaudhari MD; Charge Nurse: Daniela Rasmussen 
 
DNR  
SUBJECTIVE:  
Ms. Ricardo Corado is a 81 yo female with PMH of afib on xarelto, CKD, CVA, systolic CHF, DVT, cardiomyopathy, DM, DEWAYNE, HTN, JOÃO, pulmonary HTN, who was on home hospice who presented with c/o suprapubic lower abd cramping X3-4 days. She was found to have blood clots in her urine in the ER. Pt UA consistent with UTI. She was started on Rocephin. Harper placed and CBI started. Rocephin started. Urology consulted. Xarelto held. Pt also found to be hyponatremic. Pt a little drowsy during exam but oriented X3. Denies CP, SOB. Still with some lower abd pain. Past Medical History:  
Diagnosis Date  Acute gout involving toe of left foot 2017  CAD (coronary artery disease)  \"irregular heart beat\"  Cardiomyopathy ([address] 2009  Chronic indwelling Harper catheter  Chronic kidney disease 2002  
 on HD since 2012  Chronic systolic heart failure ([address] 2007  Colon polyps   CVA (cerebral vascular accident) ([address]  CVA (cerebral vascular accident) ([address] 2016  Deep vein thrombosis (DVT) of lower extremity ([address] 2016  Diabetes ([address]   Diabetes mellitus ([address] 2012  Duodenal ulcer  Duodenal ulcer 2012  Duodenal ulcer 2012  Dyslipidemia 2016  Essential hypertension 10/26/2009  
 Heart failure ([address]  Hypertension   Hypoglycemia, unspecified 12/3/2012  JOÃO (iron deficiency anemia)  Obesity, morbid 2009  Other ill-defined conditions(799.89)  Lymphedema  Persistent atrial fibrillation ([address] 10/26/2009  Pulmonary HTN ([address]  SBO (small bowel obstruction) ([facility] 8/8/2013  Sleep apnea 2010  
 non-compliant with CPAP  Sleep apnea - noncompliant with CPAP 11/13/2012  Type II diabetes mellitus, uncontrolled ([facility] 6/20/2012  Urinary tract infection 12/3/2012  UTI (lower urinary tract infection) 8/8/2013  UTI (urinary tract infection) 11/23/2012 Recent Results (from the past 24 hour(s)) CBC WITH AUTOMATED DIFF Collection Time: 11/23/18  8:40 PM  
Result Value Ref Range WBC 6.4 4.3 - 11.1 K/uL  
 RBC 3.48 (L) 4.05 - 5.2 M/uL HGB 9.2 (L) 11.7 - 15.4 g/dL HCT 28.7 (L) 35.8 - 46.3 % MCV 82.5 79.6 - 97.8 FL  
 MCH 26.4 26.1 - 32.9 PG  
 MCHC 32.1 31.4 - 35.0 g/dL  
 RDW 14.2 % PLATELET 681 (L) 439 - 450 K/uL MPV 13.8 (H) 9.4 - 12.3 FL ABSOLUTE NRBC 0.00 0.0 - 0.2 K/uL  
 DF AUTOMATED NEUTROPHILS 81 (H) 43 - 78 % LYMPHOCYTES 9 (L) 13 - 44 % MONOCYTES 10 4.0 - 12.0 % EOSINOPHILS 1 0.5 - 7.8 % BASOPHILS 0 0.0 - 2.0 % IMMATURE GRANULOCYTES 1 0.0 - 5.0 %  
 ABS. NEUTROPHILS 5.1 1.7 - 8.2 K/UL  
 ABS. LYMPHOCYTES 0.5 0.5 - 4.6 K/UL  
 ABS. MONOCYTES 0.6 0.1 - 1.3 K/UL  
 ABS. EOSINOPHILS 0.0 0.0 - 0.8 K/UL  
 ABS. BASOPHILS 0.0 0.0 - 0.2 K/UL  
 ABS. IMM. GRANS. 0.0 0.0 - 0.5 K/UL METABOLIC PANEL, COMPREHENSIVE Collection Time: 11/23/18  8:40 PM  
Result Value Ref Range Sodium 120 (LL) 136 - 145 mmol/L Potassium 3.8 3.5 - 5.1 mmol/L Chloride 80 (L) 98 - 107 mmol/L  
 CO2 31 21 - 32 mmol/L Anion gap 9 7 - 16 mmol/L Glucose 282 (H) 65 - 100 mg/dL  (H) 8 - 23 MG/DL Creatinine 2.28 (H) 0.6 - 1.0 MG/DL  
 GFR est AA 26 (L) >60 ml/min/1.73m2 GFR est non-AA 22 (L) >60 ml/min/1.73m2 Calcium 10.3 8.3 - 10.4 MG/DL Bilirubin, total 0.4 0.2 - 1.1 MG/DL  
 ALT (SGPT) 9 (L) 12 - 65 U/L  
 AST (SGOT) 15 15 - 37 U/L Alk. phosphatase 84 50 - 136 U/L Protein, total 8.7 (H) 6.3 - 8.2 g/dL Albumin 3.4 3.2 - 4.6 g/dL Globulin 5.3 (H) 2.3 - 3.5 g/dL A-G Ratio 0.6 (L) 1.2 - 3.5 PROTHROMBIN TIME + INR Collection Time: 11/23/18  8:40 PM  
Result Value Ref Range Prothrombin time 23.7 (H) 11.5 - 14.5 sec INR 2.2 PTT Collection Time: 11/23/18  8:40 PM  
Result Value Ref Range aPTT 58.4 (H) 23.2 - 35.3 SEC  
TYPE & SCREEN Collection Time: 11/23/18  8:42 PM  
Result Value Ref Range Crossmatch Expiration 11/26/2018 ABO/Rh(D) O POSITIVE Antibody screen NEG   
URINALYSIS W/ RFLX MICROSCOPIC Collection Time: 11/23/18 10:28 PM  
Result Value Ref Range Color RED Appearance TURBID Specific gravity 1.015 1.001 - 1.023    
 pH (UA) 7.0 5.0 - 9.0 Protein 300 (A) NEG mg/dL Glucose NEGATIVE  mg/dL Ketone 15 (A) NEG mg/dL Bilirubin LARGE (A) NEG Blood LARGE (A) NEG Urobilinogen 1.0 0.2 - 1.0 EU/dL Nitrites POSITIVE (A) NEG Leukocyte Esterase MODERATE (A) NEG    
 WBC 5-10 0 /hpf  
 RBC >100 0 /hpf Epithelial cells 0-3 0 /hpf Bacteria 4+ (H) 0 /hpf Yeast OCCASIONAL Other observations RESULTS VERIFIED MANUALLY CULTURE, URINE Collection Time: 11/23/18 10:28 PM  
Result Value Ref Range Special Requests: NO SPECIAL REQUESTS Culture result:     
  NO GROWTH AFTER SHORT PERIOD OF INCUBATION. FURTHER RESULTS TO FOLLOW AFTER OVERNIGHT INCUBATION. EKG, 12 LEAD, INITIAL Collection Time: 11/23/18 11:45 PM  
Result Value Ref Range Ventricular Rate 67 BPM  
 Atrial Rate 100 BPM  
 QRS Duration 184 ms Q-T Interval 458 ms QTC Calculation (Bezet) 483 ms Calculated R Axis 90 degrees Calculated T Axis -6 degrees Diagnosis    
  !! AGE AND GENDER SPECIFIC ECG ANALYSIS !! Atrial fibrillation Right bundle branch block Abnormal ECG When compared with ECG of 14-SEP-2018 10:58, 
T wave inversion now evident in Anterior leads Confirmed by Heriberto Hernandez ([id number]) on 11/24/2018 8:30:56 AM 
  
TROPONIN I Collection Time: 11/24/18 12:53 AM  
Result Value Ref Range Troponin-I, Qt. 0.03 0.02 - 8.49 NG/ML  
METABOLIC PANEL, BASIC Collection Time: 11/24/18  5:14 AM  
Result Value Ref Range Sodium 125 (L) 136 - 145 mmol/L Potassium 3.2 (L) 3.5 - 5.1 mmol/L Chloride 82 (L) 98 - 107 mmol/L  
 CO2 31 21 - 32 mmol/L Anion gap 12 7 - 16 mmol/L Glucose 219 (H) 65 - 100 mg/dL  (H) 8 - 23 MG/DL Creatinine 2.14 (H) 0.6 - 1.0 MG/DL  
 GFR est AA 28 (L) >60 ml/min/1.73m2 GFR est non-AA 23 (L) >60 ml/min/1.73m2 Calcium 10.0 8.3 - 10.4 MG/DL  
CBC WITH AUTOMATED DIFF Collection Time: 11/24/18  5:14 AM  
Result Value Ref Range WBC 5.4 4.3 - 11.1 K/uL  
 RBC 3.26 (L) 4.05 - 5.2 M/uL HGB 8.6 (L) 11.7 - 15.4 g/dL HCT 27.4 (L) 35.8 - 46.3 % MCV 84.0 79.6 - 97.8 FL  
 MCH 26.4 26.1 - 32.9 PG  
 MCHC 31.4 31.4 - 35.0 g/dL  
 RDW 14.2 % PLATELET 540 (L) 012 - 450 K/uL MPV 13.4 (H) 9.4 - 12.3 FL ABSOLUTE NRBC 0.00 0.0 - 0.2 K/uL  
 DF AUTOMATED NEUTROPHILS 75 43 - 78 % LYMPHOCYTES 10 (L) 13 - 44 % MONOCYTES 13 (H) 4.0 - 12.0 % EOSINOPHILS 1 0.5 - 7.8 % BASOPHILS 0 0.0 - 2.0 % IMMATURE GRANULOCYTES 1 0.0 - 5.0 %  
 ABS. NEUTROPHILS 4.0 1.7 - 8.2 K/UL  
 ABS. LYMPHOCYTES 0.6 0.5 - 4.6 K/UL  
 ABS. MONOCYTES 0.7 0.1 - 1.3 K/UL  
 ABS. EOSINOPHILS 0.0 0.0 - 0.8 K/UL  
 ABS. BASOPHILS 0.0 0.0 - 0.2 K/UL  
 ABS. IMM. GRANS. 0.0 0.0 - 0.5 K/UL  
TROPONIN I Collection Time: 11/24/18  5:14 AM  
Result Value Ref Range Troponin-I, Qt. 0.03 0.02 - 0.05 NG/ML Allergies Allergen Reactions  Benazepril Angioedema Tongue swelling  Dynacirc [Isradipine] Angioedema Tongue swelling  Peanut Angioedema Tongue swelling  Morphine Other (comments) Unknown rxn, \"morphine just does not agree with me\" Current Facility-Administered Medications Medication Dose Route Frequency Provider Last Rate Last Dose  carvedilol (COREG) tablet 12.5 mg  12.5 mg Oral BID WITH MEALS Jong Lange, Val Stevens MD   Stopped at 11/24/18 0901  
 gabapentin (NEURONTIN) capsule 300 mg  300 mg Oral TID Peri Pena MD   300 mg at 11/24/18 5843  isosorbide mononitrate ER (IMDUR) tablet 30 mg  30 mg Oral DAILY Peri Pena MD   30 mg at 11/24/18 6618  pantoprazole (PROTONIX) tablet 40 mg  40 mg Oral ACB Peri Pena MD   40 mg at 11/24/18 5502  sodium chloride (NS) flush 5-10 mL  5-10 mL IntraVENous Q8H Peri Pena MD   10 mL at 11/24/18 7499  sodium chloride (NS) flush 5-10 mL  5-10 mL IntraVENous PRN Peri Pena MD      
 acetaminophen (TYLENOL) tablet 650 mg  650 mg Oral Q4H PRN Peri Pena MD      
 HYDROcodone-acetaminophen [facility]) 5-325 mg per tablet 1 Tab  1 Tab Oral Q4H PRN Peri Pena MD      
 [facility]) injection 0.4 mg  0.4 mg IntraVENous PRN Peri Pena MD      
 diphenhydrAMINE (BENADRYL) capsule 25 mg  25 mg Oral Q4H PRN Peri Pena MD      
 ondansetron [facility]) injection 4 mg  4 mg IntraVENous Q4H PRN Peri Pena MD      
 senna-docusate (PERICOLACE) 8.6-50 mg per tablet 2 Tab  2 Tab Oral DAILY PRN Peri Pena MD      
 LORazepam (ATIVAN) tablet 0.5 mg  0.5 mg Oral Q4H PRN Peri Pena MD      
 [facility]) tablet 5 mg  5 mg Oral QHS PRN Peri Pena MD      
 hydrALAZINE (APRESOLINE) 20 mg/mL injection 20 mg  20 mg IntraVENous Q4H PRN Peri Pena MD      
 haloperidol lactate (HALDOL) injection 2 mg  2 mg IntraVENous Q4H PRN Peri Pena MD      
 alum-mag hydroxide-[facility]) oral suspension 30 mL  30 mL Oral Q4H PRN Peri Pena MD      
 0.9% sodium chloride infusion  100 mL/hr IntraVENous CONTINUOUS Peri Pena  mL/hr at 11/24/18 0900 100 mL/hr at 11/24/18 0900  cefTRIAXone (ROCEPHIN) 1 g in 0.9% sodium chloride (MBP/ADV) 50 mL  1 g IntraVENous Q24H Any Bloom MD   Stopped at 18 Review of Systems negative with exception of pertinent positives noted above PHYSICAL EXAM  
 
Visit Vitals BP 98/56 (BP 1 Location: Right arm, BP Patient Position: At rest) Pulse 61 Temp 97.5 °F (36.4 °C) Resp 18 Ht 5' 6\" (1.676 m) Wt 99.8 kg (220 lb) SpO2 100% BMI 35.51 kg/m² Temp (24hrs), Av °F (36.7 °C), Min:97.5 °F (36.4 °C), Max:99.3 °F (37.4 °C) Oxygen Therapy O2 Sat (%): 100 % (18 1024) Pulse via Oximetry: 79 beats per minute (18) O2 Device: Nasal cannula (18) O2 Flow Rate (L/min): 2 l/min (18) Intake/Output Summary (Last 24 hours) at 2018 1330 Last data filed at 2018 1218 Gross per 24 hour Intake 48241 ml Output 87010 ml Net 4400 ml General: No acute distress   
Lungs: CTA bilaterally. Resp even and nonlabored Heart:  S1S2 present without murmurs rubs gallops. RRR. Chronic lymphedema Abdomen: Soft, non distended. Suprapubic tenderness on palpation. Extremities: No cyanosis. Moves ext spontaneously. Neurologic:  Drowsy, oriented X3. No focal deficits. Results summary of Diagnostic Studies/Procedures copied from within [facility] EMR: 
 
 
ASSESSMENT Active Hospital Problems Diagnosis Date Noted  Hematuria 2018  Acute cystitis with hematuria 2018  Acute blood loss anemia 2018  Hyponatremia 2018  Bedbound 2018  Lymphedema of both lower extremities 2018 Pt on maximum diuretic therapy; still with symptomatic bilateral LE edema; refer to PT to evaluated for lymphedema therapy.  Stage 4 chronic kidney disease ([address] 2012 Follow closely [facility]. Last GFR now at 22 (from 32).  DNR (do not resuscitate) 2012  Chronic combined systolic and diastolic heart failure ([address] 2012 - 45% 2011:  EF 55% 10/2012:  EF 50% 08/2013:  EF 50% 05/2014:  EF 50% 10/2015:  EF 50% 11/2017:  EF 55%  Anemia 11/14/2012 Last Hgb improved to normal. Check today.  Sleep apnea - noncompliant with CPAP 11/13/2012 Pt needs supplies.  Pulmonary HTN- RVSP 51 11/13/2012 On sildenafil 
04/2010:  PASP 60mmHg 04/2011:  PASP 80mmHg 10/2012:  PASP 60mmHg - severely dilated RV and severely reduced RV function 08/2013:  PASP 60mmHg 
05/2014:  PASP 60mmHg - Dilated RV and mild HK 
10/2015:  PASP 62mmHg - Dilated RV and mild HK  
11/2017:  PASP 36mmHg - Dilated RV and mild HK  Morbid obesity ([address] 06/20/2012  Cardiomyopathy ([facility] 11/25/2009 Following with Cardiology.  Essential hypertension 10/26/2009 At goal. Continue current tx  Persistent atrial fibrillation ([address] 10/26/2009 Plan: UTI:  Follow urine cx. Continue rocephin Hematuria:  Urology consulted. Continue CBI. Holding xarelto. PAF:  Holding xarelto Hyponatremia:  Improving. Continue IVF. BMP in AM 
 
HTN:  Stable, continue current regimen Chronic combined systolic diastolic HF: Echo Sep 0347 with EF 55%. Continue current regimen. Notes, labs, VS, diagnostic testing reviewed Time spent with pt 20 min DVT Prophylaxis: SCDs Plan of Care Discussed with: Supervising MD Dr. Chacon Im, care team, pt Malini Marinelli NP

## 2018-11-24 NOTE — PROGRESS NOTES
Hourly rounds performed, all needs met. Pt had an overall good day. Will monitor pt until giving report to nightshift, RN.

## 2018-11-24 NOTE — ED NOTES
TRANSFER - OUT REPORT: 
 
Verbal report given to Charmaine(name) on [address] [place] [address],[address]  being transferred to [facility](unit) for routine progression of care Report consisted of patients Situation, Background, Assessment and  
Recommendations(SBAR). Information from the following report(s) SBAR was reviewed with the receiving nurse. Lines:  
Peripheral IV 11/23/18 Left Forearm (Active) Site Assessment Clean, dry, & intact 11/23/2018  8:41 PM  
Phlebitis Assessment 0 11/23/2018  8:41 PM  
Infiltration Assessment 0 11/23/2018  8:41 PM  
Dressing Status Clean, dry, & intact 11/23/2018  8:41 PM  
  
 
Opportunity for questions and clarification was provided. Patient transported with: 
 [employer]

## 2018-11-24 NOTE — PROGRESS NOTES
11/24/18 0129 Dual Skin Pressure Injury Assessment Dual Skin Pressure Injury Assessment WDL Second Care Provider (Based on [address] [place] [address]) Yolo Favorite, RN Patient's skin is intact. Patient has bilateral lower extremity lymphedema. Skin on lower legs is dry, leathery. Patient has old scars on abdomen.

## 2018-11-24 NOTE — PROGRESS NOTES
Hourly rounding completed. All patient's needs met at this time. Patient's daughter to bring medications in the morning to complete home med rec.

## 2018-11-24 NOTE — ED NOTES
Per Dr. Ulysses Foyer. Ordered to check a trop and change the du size from an 18 to 22, pt is leaking around the du insertion

## 2018-11-24 NOTE — H&P
Hospitalist H&P Note Admit Date:  2018  8:20 PM  
Name:  Mercedes Linton Age:  80 y.o. 
:  1935 MRN:  [id number] PCP:  Jac Knutson MD 
Treatment Team: Attending Provider: Royal Abhishek MD; Primary Nurse: Anitha Desir RN 
 
HPI:  
Pt is an 81 y/o F with extensive medical history who presented with complaint of bleeding in her briefs. Family says this was first noticed today. She is bedbound at baseline and was on home hospice for end stage CHF reportedly. She has been increasingly fatigued the past few weeks. She had some cramping suprapubic/lower abd pain off and on the last 3-4 days. She was noted to have several blood clots in her urine in ER. Denies history of hematuria in past.  Denies dysuria or other urinary symptoms. No CP, SOB, cough, diarrhea, fevers, chills. 10 systems reviewed and negative except as noted in HPI. Past Medical History:  
Diagnosis Date  Acute gout involving toe of left foot 2017  CAD (coronary artery disease)  \"irregular heart beat\"  Cardiomyopathy ([address] 2009  Chronic indwelling Harper catheter  Chronic kidney disease 2002  
 on HD since 2012  Chronic systolic heart failure ([address] 2007  Colon polyps   CVA (cerebral vascular accident) ([address]  CVA (cerebral vascular accident) ([address] 2016  Deep vein thrombosis (DVT) of lower extremity ([address] 2016  Diabetes ([address] 2002  Diabetes mellitus ([address] 2012  Duodenal ulcer  Duodenal ulcer 2012  Duodenal ulcer 2012  Dyslipidemia 2016  Essential hypertension 10/26/2009  
 Heart failure ([address]  Hypertension 2009  Hypoglycemia, unspecified 12/3/2012  JOÃO (iron deficiency anemia)  Obesity, morbid 2009  Other ill-defined conditions(799.89)  Lymphedema  Persistent atrial fibrillation ([address] 10/26/2009  Pulmonary HTN ([address]  SBO (small bowel obstruction) ([facility] 8/8/2013  Sleep apnea 2010  
 non-compliant with CPAP  Sleep apnea - noncompliant with CPAP 11/13/2012  Type II diabetes mellitus, uncontrolled ([facility] 6/20/2012  Urinary tract infection 12/3/2012  UTI (lower urinary tract infection) 8/8/2013  UTI (urinary tract infection) 11/23/2012 Past Surgical History:  
Procedure Laterality Date  HX COLONOSCOPY  2016  
 polyp  HX HYSTERECTOMY  >20 years ago  HX TUBAL LIGATION  >40 years ago Allergies Allergen Reactions  Benazepril Angioedema Tongue swelling  Dynacirc [Isradipine] Angioedema Tongue swelling  Peanut Angioedema Tongue swelling  Morphine Other (comments) Unknown rxn, \"morphine just does not agree with me\" Social History Tobacco Use  Smoking status: Never Smoker  Smokeless tobacco: Never Used Substance Use Topics  Alcohol use: No  
  
Family History Problem Relation Age of Onset  Heart Disease Sister  Diabetes Other Immunization History Administered Date(s) Administered  Influenza Vaccine 10/05/2015  Influenza Vaccine (Quad) PF 09/14/2018  Pneumococcal Conjugate (PCV-13) 06/14/2017  TB Skin Test (PPD) Intradermal 08/19/2013, 09/07/2018 PTA Medications: 
Prior to Admission Medications Prescriptions Last Dose Informant Patient Reported? Taking?  
ascorbic acid, vitamin C, (VITAMIN C) 500 mg tablet   Yes No  
Sig: Take 500 mg by mouth daily. aspirin 81 mg tablet   Yes No  
Sig: Take 81 mg by mouth daily. carvedilol (COREG) 12.5 mg tablet   No No  
Sig: TAKE 1 TABLET BY MOUTH TWO (2) TIMES DAILY (WITH MEALS). cholecalciferol (VITAMIN D3) 1,000 unit cap   No No  
Sig: Take 1 Cap by mouth daily. furosemide (LASIX) 20 mg tablet   No No  
Sig: Take 1 Tab by mouth daily. gabapentin (NEURONTIN) 300 mg capsule   No No  
Sig: Take 1 Cap by mouth three (3) times daily. isosorbide mononitrate ER (IMDUR) 30 mg tablet   No No  
Sig: TAKE 1 TABLET BY MOUTH DAILY. metOLazone (ZAROXOLYN) 5 mg tablet   No No  
Sig: TAKE 1 TABLET BY MOUTH 30 MINUTES BEFORE LASIX DOSE  
pantoprazole (PROTONIX) 40 mg tablet   No No  
Sig: TAKE 1 TABLET BY MOUTH DAILY (BEFORE BREAKFAST). potassium chloride SA (MICRO-K) 10 mEq capsule   No No  
Sig: TAKE 1 CAPSULE BY MOUTH DAILY. INDICATIONS: HYPOKALEMIA Facility-Administered Medications: None Objective:  
 
Patient Vitals for the past 24 hrs: 
 Temp Pulse Resp BP SpO2  
11/23/18 2253  71  153/73 100 % 11/23/18 2232  75 22 130/60 100 % 11/23/18 2212  72  130/66 100 % 11/23/18 2046  76 18 127/60 100 % 11/23/18 2014 99.3 °F (37.4 °C) 79 16 138/63  Oxygen Therapy O2 Sat (%): 100 % (11/23/18 2253) Pulse via Oximetry: 74 beats per minute (11/23/18 2253) O2 Device: Nasal cannula (11/23/18 2046) O2 Flow Rate (L/min): 2 l/min (11/23/18 2046) Intake/Output Summary (Last 24 hours) at 11/23/2018 2318 Last data filed at 11/23/2018 2303 Gross per 24 hour Intake 3000 ml Output 400 ml Net 2600 ml *Note that automatically entered I/Os may not be accurate; dependent on patient compliance with collection and accurate  by assistants. Physical Exam: 
General:     somnolent. obese Eyes:   Normal sclera. Extraocular movements intact. ENT:  Normocephalic, atraumatic. Moist mucous membranes CV:   RRR. No m/r/g. Lungs:  CTAB. No wheezing, rhonchi, or rales. Abdomen: Soft, nontender, nondistended. Extremities: Warm and dry. No cyanosis. Chronic lymphedema. Neurologic: CN II-XII grossly intact. Sensation intact. Skin:     No rashes or jaundice. Normal coloration Psych:  Normal mood and affect. I reviewed the labs, imaging, EKGs, telemetry, and other studies done this admission. Data Review:  
Recent Results (from the past 24 hour(s)) CBC WITH AUTOMATED DIFF  
 Collection Time: 11/23/18  8:40 PM  
Result Value Ref Range WBC 6.4 4.3 - 11.1 K/uL  
 RBC 3.48 (L) 4.05 - 5.2 M/uL HGB 9.2 (L) 11.7 - 15.4 g/dL HCT 28.7 (L) 35.8 - 46.3 % MCV 82.5 79.6 - 97.8 FL  
 MCH 26.4 26.1 - 32.9 PG  
 MCHC 32.1 31.4 - 35.0 g/dL  
 RDW 14.2 % PLATELET 525 (L) 954 - 450 K/uL MPV 13.8 (H) 9.4 - 12.3 FL ABSOLUTE NRBC 0.00 0.0 - 0.2 K/uL  
 DF AUTOMATED NEUTROPHILS 81 (H) 43 - 78 % LYMPHOCYTES 9 (L) 13 - 44 % MONOCYTES 10 4.0 - 12.0 % EOSINOPHILS 1 0.5 - 7.8 % BASOPHILS 0 0.0 - 2.0 % IMMATURE GRANULOCYTES 1 0.0 - 5.0 %  
 ABS. NEUTROPHILS 5.1 1.7 - 8.2 K/UL  
 ABS. LYMPHOCYTES 0.5 0.5 - 4.6 K/UL  
 ABS. MONOCYTES 0.6 0.1 - 1.3 K/UL  
 ABS. EOSINOPHILS 0.0 0.0 - 0.8 K/UL  
 ABS. BASOPHILS 0.0 0.0 - 0.2 K/UL  
 ABS. IMM. GRANS. 0.0 0.0 - 0.5 K/UL METABOLIC PANEL, COMPREHENSIVE Collection Time: 11/23/18  8:40 PM  
Result Value Ref Range Sodium 120 (LL) 136 - 145 mmol/L Potassium 3.8 3.5 - 5.1 mmol/L Chloride 80 (L) 98 - 107 mmol/L  
 CO2 31 21 - 32 mmol/L Anion gap 9 7 - 16 mmol/L Glucose 282 (H) 65 - 100 mg/dL  (H) 8 - 23 MG/DL Creatinine 2.28 (H) 0.6 - 1.0 MG/DL  
 GFR est AA 26 (L) >60 ml/min/1.73m2 GFR est non-AA 22 (L) >60 ml/min/1.73m2 Calcium 10.3 8.3 - 10.4 MG/DL Bilirubin, total 0.4 0.2 - 1.1 MG/DL  
 ALT (SGPT) 9 (L) 12 - 65 U/L  
 AST (SGOT) 15 15 - 37 U/L Alk. phosphatase 84 50 - 136 U/L Protein, total 8.7 (H) 6.3 - 8.2 g/dL Albumin 3.4 3.2 - 4.6 g/dL Globulin 5.3 (H) 2.3 - 3.5 g/dL A-G Ratio 0.6 (L) 1.2 - 3.5 PROTHROMBIN TIME + INR Collection Time: 11/23/18  8:40 PM  
Result Value Ref Range Prothrombin time 23.7 (H) 11.5 - 14.5 sec INR 2.2 PTT Collection Time: 11/23/18  8:40 PM  
Result Value Ref Range aPTT 58.4 (H) 23.2 - 35.3 SEC  
TYPE & SCREEN Collection Time: 11/23/18  8:42 PM  
Result Value Ref Range Crossmatch Expiration 11/26/2018 ABO/Rh(D) O POSITIVE Antibody screen NEG   
URINALYSIS W/ RFLX MICROSCOPIC Collection Time: 11/23/18 10:28 PM  
Result Value Ref Range Color RED Appearance TURBID Specific gravity 1.015 1.001 - 1.023    
 pH (UA) 7.0 5.0 - 9.0 Protein 300 (A) NEG mg/dL Glucose NEGATIVE  mg/dL Ketone 15 (A) NEG mg/dL Bilirubin LARGE (A) NEG Blood LARGE (A) NEG Urobilinogen 1.0 0.2 - 1.0 EU/dL Nitrites POSITIVE (A) NEG Leukocyte Esterase MODERATE (A) NEG All Micro Results Procedure Component Value Units Date/Time CULTURE, URINE [[id number]] Collected:  11/23/18 2228 Order Status:  Completed Specimen:  Urine from Clean catch Updated:  11/23/18 2307 Current Facility-Administered Medications Medication Dose Route Frequency  sodium chloride (NS) flush 5-10 mL  5-10 mL IntraVENous Q8H  
 sodium chloride (NS) flush 5-10 mL  5-10 mL IntraVENous PRN  
 0.9% sodium chloride infusion  100 mL/hr IntraVENous CONTINUOUS Current Outpatient Medications Medication Sig  furosemide (LASIX) 20 mg tablet Take 1 Tab by mouth daily.  isosorbide mononitrate ER (IMDUR) 30 mg tablet TAKE 1 TABLET BY MOUTH DAILY.  metOLazone (ZAROXOLYN) 5 mg tablet TAKE 1 TABLET BY MOUTH 30 MINUTES BEFORE LASIX DOSE  pantoprazole (PROTONIX) 40 mg tablet TAKE 1 TABLET BY MOUTH DAILY (BEFORE BREAKFAST).  gabapentin (NEURONTIN) 300 mg capsule Take 1 Cap by mouth three (3) times daily.  potassium chloride SA (MICRO-K) 10 mEq capsule TAKE 1 CAPSULE BY MOUTH DAILY. INDICATIONS: HYPOKALEMIA  carvedilol (COREG) 12.5 mg tablet TAKE 1 TABLET BY MOUTH TWO (2) TIMES DAILY (WITH MEALS).  ascorbic acid, vitamin C, (VITAMIN C) 500 mg tablet Take 500 mg by mouth daily.  cholecalciferol (VITAMIN D3) 1,000 unit cap Take 1 Cap by mouth daily.  aspirin 81 mg tablet Take 81 mg by mouth daily. Other Studies: Xr Chest [facility] Result Date: 11/23/2018 CHEST X-RAY, single portable view  11/23/2018 History: Chest pain. Technique: Single frontal view of the chest. Comparison: Chest x-ray 9/14/2018 Findings: The cardiac silhouette is moderate to severely enlarged although stable. The lungs are expanded without evidence for pneumothorax. No consolidative airspace process or pleural effusion is seen. Stable moderate to advanced degenerative changes are seen of the bilateral shoulders. IMPRESSION: 1.  Stable moderate to severe cardiomegaly. No evolving acute changes are seen. Assessment and Plan:  
 
Hospital Problems as of 11/23/2018 Date Reviewed: 10/22/2018 Codes Class Noted - Resolved POA Hematuria ICD-10-CM: R31.9 ICD-9-CM: 599.70  11/23/2018 - Present Yes * (Principal) Hyponatremia ICD-10-CM: E87.1 ICD-9-CM: 276.1  9/7/2018 - Present Yes Bedbound (Chronic) ICD-10-CM: Z74.01 
ICD-9-CM: V49.84  8/21/2018 - Present Yes Lymphedema of both lower extremities (Chronic) ICD-10-CM: I89.0 ICD-9-CM: 457.1  4/11/2018 - Present Yes Overview Signed 4/11/2018  4:30 PM by Keri Triana MD  
  Pt on maximum diuretic therapy; still with symptomatic bilateral LE edema; refer to PT to evaluated for lymphedema therapy. Stage 4 chronic kidney disease (HCC) (Chronic) ICD-10-CM: N18.4 ICD-9-CM: 585.4  12/3/2012 - Present Yes Overview Addendum 4/11/2018  4:32 PM by Keri Triana MD  
  Follow closely [facility]. Last GFR now at 22 (from 32). DNR (do not resuscitate) (Chronic) ICD-10-CM: Y59 ICD-9-CM: V49.86  11/23/2012 - Present Yes Chronic combined systolic and diastolic heart failure (HCC) (Chronic) ICD-10-CM: I50.42 
ICD-9-CM: 428.42  11/14/2012 - Present Yes Overview Addendum 5/15/2018  9:52 AM by Ainsley Thomas MD  
  07/2010 - 45% 04/2011:  EF 55% 10/2012:  EF 50% 08/2013:  EF 50% 05/2014:  EF 50% 10/2015:  EF 50% 11/2017:  EF 55% Anemia (Chronic) ICD-10-CM: D64.9 ICD-9-CM: 285.9  11/14/2012 - Present Yes Overview Addendum 1/30/2018 12:46 PM by Milly Steinberg MD  
  Last Hgb improved to normal. Check today. Pulmonary HTN- RVSP 51 (Chronic) ICD-10-CM: I27.20 ICD-9-CM: 416.8  11/13/2012 - Present Yes Overview Addendum 5/15/2018  9:51 AM by Alfredo Jaquez MD  
  On sildenafil 
04/2010:  PASP 60mmHg 04/2011:  PASP 80mmHg 10/2012:  PASP 60mmHg - severely dilated RV and severely reduced RV function 08/2013:  PASP 60mmHg 
05/2014:  PASP 60mmHg - Dilated RV and mild HK 
10/2015:  PASP 62mmHg - Dilated RV and mild HK  
11/2017:  PASP 36mmHg - Dilated RV and mild HK Sleep apnea - noncompliant with CPAP (Chronic) ICD-10-CM: G47.30 ICD-9-CM: 780.57  11/13/2012 - Present Yes Overview Addendum 4/11/2018  4:32 PM by Milly Steinberg MD  
  Pt needs supplies. Morbid obesity ([facility] (Chronic) ICD-10-CM: E66.01 
ICD-9-CM: 278.01  6/20/2012 - Present Yes Cardiomyopathy ([facility] (Chronic) ICD-10-CM: I42.9 ICD-9-CM: 425.4  11/25/2009 - Present Yes Overview Addendum 1/31/2017  5:17 PM by Milly Steinberg MD  
  Following with Cardiology. Persistent atrial fibrillation (HCC) (Chronic) ICD-10-CM: I48.1 ICD-9-CM: 427.31  10/26/2009 - Present Yes Essential hypertension (Chronic) ICD-10-CM: I10 
ICD-9-CM: 401.9  10/26/2009 - Present Yes Overview Signed 4/18/2017  5:10 PM by Milly Steinberg MD  
  At goal. Continue current tx Plan: 
· Inpatient · CBI started · Will start rocephin fro possible UTI; pos nitrites/LE. Send urine cx · Consult urology; I think this is an unusually large amount of hematuria even though pt has UTI and was on [facility] · Pt is a poor candidate for [facility] going forward with hx GI and urologic bleeding. Recommend stopping for good. · NS IVF for hypoNa. Hold home diuretics. Monitor Na, next check tomorrow morning · Pt revoked hospice to come to ER. Consult palliative care. · Other chronic conditions stable, cont home meds Discharge planning:  bedbound at baseline. Home when ready DVT ppx: SCDs Code status:  DNR Estimated LOS:  Greater than 2 midnights Risk:  High Surrogate:  daughter Signed: 
Maris Ruiz MD

## 2018-11-24 NOTE — ED NOTES
Dr. Lauren Christian notified that pt is having chest pain in the epigastric region. MD ordered another  EKG. Dr. Manjinder Mason notified.

## 2018-11-24 NOTE — CONSULTS
[address]    John Monteiro  MR#: [id number]  : 1935  ACCOUNT #: [de-identified]   DATE OF SERVICE: 2018    CONSULTING PHYSICIAN:  Michael Villa MD    REASON FOR CONSULTATION:  Gross hematuria. HISTORY OF PRESENT ILLNESS:  An 57-year-old female with extensive medical history. She had been on hospice care for end-stage congestive heart failure. She has been bedbound. Has been complaining of suprapubic and lower abdominal pain for 2 or 3 days and noted to have blood clots in her urine in the ER. She had a urinalysis showing positive nitrite, moderate leukocytes, 5-10 WBC, greater than 100 RBC, 4+ bacteria. She had been taking Xarelto for a history of atrial fibrillation and DVT. Patient was admitted to hospitalist service and Harper catheter was placed. She has some clots in her bladder, currently with a 3-way Harper running. She has had a urine culture from 2018, that is at this point showing no growth. She has been started on IV Rocephin. PAST MEDICAL HISTORY:  1. Coronary artery disease with congestive heart failure and cardiomyopathy. 2.  History of DVT. 3.  History of CVA. 4.  Diabetes. 5.  Obesity. 6.  Atrial fibrillation. The labs show a positive urine culture from 2018, which grew greater than 100 Klebsiella, which was resistant to nitrofurantoin, but otherwise sensitive to other antibiotics. PHYSICAL EXAMINATION:  The patient is not a good historian, nonverbal.  She is sitting in bed, appears to be in no distress. The abdomen is soft and nontender. There is a 3-way Harper present with CBI running at a slow rate. The urine in the tubing is pink in color and there are no clots. It should also be noted that she had a CT of the abdomen and pelvis done in 2016, which was done for abdominal pain and was done without IV contrast.  This shows no renal problems.   Lab work also significant for a creatinine of 2.14.    ASSESSMENT:    1.  Gross hematuria with possible urinary tract infection. 2.  End-stage congestive heart failure. 3.  The patient had recently been on Xarelto. RECOMMENDATION:  I would go ahead and continue to treat the UTI. This may help her hematuria and we will follow the culture result. I agree that she is a poor candidate for anticoagulation. It may be best to stop her Xarelto, especially if she is going to go back to hospice status. Continue the 3-way Harper. She may need a cystoscopy in the future, but if she has a simple UTI that improves with treatment, we will not have to do that.       Tacos Medina MD       [facility] [place] [facility] / [facility]  D: 11/24/2018 12:11     T: 11/24/2018 12:49  JOB #: [id number]

## 2018-11-24 NOTE — ED NOTES
Assisted Dr. America Reeder with rectal exam. Pt was soiled. Sheets changed, new brief applied. MD ordered a straight cath for urine. Urine collected but not able to completely empty bladder due to blood clots. MD called to bedside.

## 2018-11-24 NOTE — ED PROVIDER NOTES
80-year-old female brought in by family. 2 episodes of bright red bleeding with clots this evening. No history of rectal bleeding before. Patient is on hospice because of end-stage heart failure. Some mild crampy abdominal pain yesterday. She's had no fever no vomiting no diarrhea there was some stool that was performed that was mixed with the bright red blood. no syncope. no chest pain or shortness of breath. Patient is on Xarelto because of history of atrial fibrillation and DVT. No abnormal bleeding otherwise. The history is provided by the patient. Rectal Bleeding This is a new problem. The current episode started 6 to 12 hours ago. Associated symptoms include abdominal pain. Pertinent negatives include no dysuria, no abdominal distention, no chills, no fever, no nausea, no back pain, no vomiting, no diarrhea and no constipation. Past Medical History:  
Diagnosis Date  Acute gout involving toe of left foot 5/19/2017  CAD (coronary artery disease) 2007 \"irregular heart beat\"  Cardiomyopathy ([address] 11/25/2009  Chronic indwelling Harper catheter  Chronic kidney disease 2002  
 on HD since Nov 2012  Chronic systolic heart failure ([address] 2007  Colon polyps 2016  CVA (cerebral vascular accident) ([address]  CVA (cerebral vascular accident) ([address] 8/8/2016  Deep vein thrombosis (DVT) of lower extremity ([address] 5/27/2016  Diabetes ([address] 2002  Diabetes mellitus ([address] 6/20/2012  Duodenal ulcer  Duodenal ulcer 11/18/2012  Duodenal ulcer 11/18/2012  Dyslipidemia 8/8/2016  Essential hypertension 10/26/2009  
 Heart failure ([address]  Hypertension 2009  Hypoglycemia, unspecified 12/3/2012  JOÃO (iron deficiency anemia)  Obesity, morbid 11/25/2009  Other ill-defined conditions(799.89) 2002 Lymphedema  Persistent atrial fibrillation ([address] 10/26/2009  Pulmonary HTN ([address]  SBO (small bowel obstruction) ([address] 8/8/2013  Sleep apnea 2010  
 non-compliant with CPAP  Sleep apnea - noncompliant with CPAP 11/13/2012  Type II diabetes mellitus, uncontrolled ([facility] 6/20/2012  Urinary tract infection 12/3/2012  UTI (lower urinary tract infection) 8/8/2013  UTI (urinary tract infection) 11/23/2012 Past Surgical History:  
Procedure Laterality Date  HX COLONOSCOPY  2016  
 polyp  HX HYSTERECTOMY  >20 years ago  HX TUBAL LIGATION  >40 years ago Family History:  
Problem Relation Age of Onset  Heart Disease Sister  Diabetes Other Social History Socioeconomic History  Marital status:  Spouse name: Not on file  Number of children: Not on file  Years of education: Not on file  Highest education level: Not on file Social Needs  Financial resource strain: Not on file  Food insecurity - worry: Not on file  Food insecurity - inability: Not on file  Transportation needs - medical: Not on file  Transportation needs - non-medical: Not on file Occupational History  Not on file Tobacco Use  Smoking status: Never Smoker  Smokeless tobacco: Never Used Substance and Sexual Activity  Alcohol use: No  
 Drug use: No  
 Sexual activity: Not on file Other Topics Concern  Not on file Social History Narrative , lives at home with spouse. Retired day care worker ALLERGIES: Benazepril; Dynacirc [isradipine]; Peanut; and Morphine Review of Systems Constitutional: Negative for chills and fever. Respiratory: Negative for cough and shortness of breath. Cardiovascular: Negative for chest pain and palpitations. Gastrointestinal: Positive for abdominal pain and anal bleeding. Negative for abdominal distention, constipation, diarrhea, nausea and vomiting. Genitourinary: Negative for dysuria and flank pain. Musculoskeletal: Negative for back pain and neck pain. Skin: Negative for color change and rash. Neurological: Negative for syncope. All other systems reviewed and are negative. Vitals:  
 11/23/18 2014 BP: 138/63 Pulse: 79 Resp: 16 Temp: 99.3 °F (37.4 °C) Weight: 99.8 kg (220 lb) Height: 5' 6\" (1.676 m) Physical Exam  
Constitutional: She is oriented to person, place, and time. She appears well-developed and well-nourished. No distress. HENT:  
Head: Normocephalic and atraumatic. Right Ear: External ear normal.  
Left Ear: External ear normal.  
Mouth/Throat: Oropharynx is clear and moist. No oropharyngeal exudate. Eyes: Conjunctivae and EOM are normal. Pupils are equal, round, and reactive to light. Neck: Normal range of motion. Neck supple. Cardiovascular: Normal rate, regular rhythm and intact distal pulses. No murmur heard. Pulmonary/Chest: Breath sounds normal. No respiratory distress. Abdominal: Soft. Bowel sounds are normal. She exhibits no mass. There is no tenderness. There is no rebound and no guarding. No hernia. Genitourinary:  
Genitourinary Comments: On rectal examination there is much blood with clots in the adult diaper. Vaginal inspection does not reveal any obvious bleeding. No blood at the meatus. No obvious hemorrhoids visualized. Digital rectal exam reveals some heme positive stool but it is tan  And not associated with any active bleeding at this point. Neurological: She is alert and oriented to person, place, and time. Gait normal.  
Nl speech Skin: Skin is warm and dry. Psychiatric: She has a normal mood and affect. Her speech is normal.  
Nursing note and vitals reviewed. MDM Number of Diagnoses or Management Options Diagnosis management comments: Assessment CBC and other laboratory. Rectal examination. Amount and/or Complexity of Data Reviewed Clinical lab tests: ordered and reviewed Review and summarize past medical records: yes (Congestive heart failure, atrial fibrillation, hypertension. Patient does have a history of peptic ulcer disease in the past and is been treated for anemia.) Risk of Complications, Morbidity, and/or Mortality Presenting problems: moderate Diagnostic procedures: minimal 
Management options: low Patient Progress Patient progress: stable Procedures Catheterization of bladder reveals much blood with clots. In that unable to completely empty his bladder due to clots. We will insert 3-way catheter. Results Include: 
 
Recent Results (from the past 24 hour(s)) CBC WITH AUTOMATED DIFF Collection Time: 11/23/18  8:40 PM  
Result Value Ref Range WBC 6.4 4.3 - 11.1 K/uL  
 RBC 3.48 (L) 4.05 - 5.2 M/uL HGB 9.2 (L) 11.7 - 15.4 g/dL HCT 28.7 (L) 35.8 - 46.3 % MCV 82.5 79.6 - 97.8 FL  
 MCH 26.4 26.1 - 32.9 PG  
 MCHC 32.1 31.4 - 35.0 g/dL  
 RDW 14.2 % PLATELET 242 (L) 350 - 450 K/uL MPV 13.8 (H) 9.4 - 12.3 FL ABSOLUTE NRBC 0.00 0.0 - 0.2 K/uL  
 DF AUTOMATED NEUTROPHILS 81 (H) 43 - 78 % LYMPHOCYTES 9 (L) 13 - 44 % MONOCYTES 10 4.0 - 12.0 % EOSINOPHILS 1 0.5 - 7.8 % BASOPHILS 0 0.0 - 2.0 % IMMATURE GRANULOCYTES 1 0.0 - 5.0 %  
 ABS. NEUTROPHILS 5.1 1.7 - 8.2 K/UL  
 ABS. LYMPHOCYTES 0.5 0.5 - 4.6 K/UL  
 ABS. MONOCYTES 0.6 0.1 - 1.3 K/UL  
 ABS. EOSINOPHILS 0.0 0.0 - 0.8 K/UL  
 ABS. BASOPHILS 0.0 0.0 - 0.2 K/UL  
 ABS. IMM. GRANS. 0.0 0.0 - 0.5 K/UL METABOLIC PANEL, COMPREHENSIVE Collection Time: 11/23/18  8:40 PM  
Result Value Ref Range Sodium 120 (LL) 136 - 145 mmol/L Potassium 3.8 3.5 - 5.1 mmol/L Chloride 80 (L) 98 - 107 mmol/L  
 CO2 31 21 - 32 mmol/L Anion gap 9 7 - 16 mmol/L Glucose 282 (H) 65 - 100 mg/dL  (H) 8 - 23 MG/DL Creatinine 2.28 (H) 0.6 - 1.0 MG/DL  
 GFR est AA 26 (L) >60 ml/min/1.73m2 GFR est non-AA 22 (L) >60 ml/min/1.73m2 Calcium 10.3 8.3 - 10.4 MG/DL  Bilirubin, total 0.4 0.2 - 1.1 MG/DL  
 ALT (SGPT) 9 (L) 12 - 65 U/L  
 AST (SGOT) 15 15 - 37 U/L Alk. phosphatase 84 50 - 136 U/L Protein, total 8.7 (H) 6.3 - 8.2 g/dL Albumin 3.4 3.2 - 4.6 g/dL Globulin 5.3 (H) 2.3 - 3.5 g/dL A-G Ratio 0.6 (L) 1.2 - 3.5 PROTHROMBIN TIME + INR Collection Time: 11/23/18  8:40 PM  
Result Value Ref Range Prothrombin time 23.7 (H) 11.5 - 14.5 sec INR 2.2 PTT Collection Time: 11/23/18  8:40 PM  
Result Value Ref Range aPTT 58.4 (H) 23.2 - 35.3 SEC  
TYPE & SCREEN Collection Time: 11/23/18  8:42 PM  
Result Value Ref Range Crossmatch Expiration 11/26/2018 ABO/Rh(D) O POSITIVE Antibody screen NEG Xr Chest [facility] Result Date: 11/23/2018 CHEST X-RAY, single portable view  11/23/2018 History: Chest pain. Technique: Single frontal view of the chest. Comparison: Chest x-ray 9/14/2018 Findings: The cardiac silhouette is moderate to severely enlarged although stable. The lungs are expanded without evidence for pneumothorax. No consolidative airspace process or pleural effusion is seen. Stable moderate to advanced degenerative changes are seen of the bilateral shoulders. IMPRESSION: 1.  Stable moderate to severe cardiomegaly. No evolving acute changes are seen. Family states patient still takes Lasix although less diuretics unless dosage when  Then when she was at the hospital.  Catheter inserted for irrigation. The patient appears to be extremely dehydrated with acute kidney injury with the UN of over 200.

## 2018-11-24 NOTE — ED TRIAGE NOTES
Bed bound pt home with family as care givers. Rectal bleeding x 2 bowel movement. No other complaints at this time. Stays on 0 2 at 2L via NC at all times.

## 2018-11-25 LAB
ANION GAP SERPL CALC-SCNC: 9 MMOL/L (ref 7–16)
BASOPHILS # BLD: 0 K/UL (ref 0–0.2)
BASOPHILS NFR BLD: 0 % (ref 0–2)
BUN SERPL-MCNC: 202 MG/DL (ref 8–23)
CALCIUM SERPL-MCNC: 9.7 MG/DL (ref 8.3–10.4)
CHLORIDE SERPL-SCNC: 90 MMOL/L (ref 98–107)
CO2 SERPL-SCNC: 30 MMOL/L (ref 21–32)
CREAT SERPL-MCNC: 1.99 MG/DL (ref 0.6–1)
DIFFERENTIAL METHOD BLD: ABNORMAL
EOSINOPHIL # BLD: 0.1 K/UL (ref 0–0.8)
EOSINOPHIL NFR BLD: 3 % (ref 0.5–7.8)
ERYTHROCYTE [DISTWIDTH] IN BLOOD BY AUTOMATED COUNT: 14.2 %
FERRITIN SERPL-MCNC: 822 NG/ML (ref 8–388)
FOLATE SERPL-MCNC: 8.6 NG/ML (ref 3.1–17.5)
GLUCOSE BLD STRIP.AUTO-MCNC: 354 MG/DL (ref 65–100)
GLUCOSE SERPL-MCNC: 148 MG/DL (ref 65–100)
HCT VFR BLD AUTO: 22.8 % (ref 35.8–46.3)
HGB BLD-MCNC: 7 G/DL (ref 11.7–15.4)
HGB BLD-MCNC: 7.2 G/DL (ref 11.7–15.4)
HGB RETIC QN AUTO: 28 PG (ref 29–35)
IMM GRANULOCYTES # BLD: 0 K/UL (ref 0–0.5)
IMM GRANULOCYTES NFR BLD AUTO: 1 % (ref 0–5)
IMM RETICS NFR: 8.5 % (ref 3–15.9)
IRON SATN MFR SERPL: 17 %
IRON SERPL-MCNC: 39 UG/DL (ref 35–150)
IRON SERPL-MCNC: 40 UG/DL (ref 35–150)
LYMPHOCYTES # BLD: 0.5 K/UL (ref 0.5–4.6)
LYMPHOCYTES NFR BLD: 14 % (ref 13–44)
MCH RBC QN AUTO: 26.6 PG (ref 26.1–32.9)
MCHC RBC AUTO-ENTMCNC: 31.6 G/DL (ref 31.4–35)
MCV RBC AUTO: 84.1 FL (ref 79.6–97.8)
MONOCYTES # BLD: 0.4 K/UL (ref 0.1–1.3)
MONOCYTES NFR BLD: 11 % (ref 4–12)
NEUTS SEG # BLD: 2.6 K/UL (ref 1.7–8.2)
NEUTS SEG NFR BLD: 72 % (ref 43–78)
NRBC # BLD: 0 K/UL (ref 0–0.2)
PLATELET # BLD AUTO: 117 K/UL (ref 150–450)
PMV BLD AUTO: 13.2 FL (ref 9.4–12.3)
POTASSIUM SERPL-SCNC: 3.7 MMOL/L (ref 3.5–5.1)
RBC # BLD AUTO: 2.71 M/UL (ref 4.05–5.2)
RETICS # AUTO: 0.04 M/UL (ref 0.03–0.1)
RETICS/RBC NFR AUTO: 1.4 % (ref 0.3–2)
SODIUM SERPL-SCNC: 129 MMOL/L (ref 136–145)
TIBC SERPL-MCNC: 232 UG/DL (ref 250–450)
TRANSFERRIN SERPL-MCNC: 180 MG/DL (ref 202–364)
VIT B12 SERPL-MCNC: 1011 PG/ML (ref 193–986)
WBC # BLD AUTO: 3.6 K/UL (ref 4.3–11.1)

## 2018-11-25 PROCEDURE — 84466 ASSAY OF TRANSFERRIN: CPT

## 2018-11-25 PROCEDURE — 74011250637 HC RX REV CODE- 250/637: Performed by: INTERNAL MEDICINE

## 2018-11-25 PROCEDURE — 82962 GLUCOSE BLOOD TEST: CPT

## 2018-11-25 PROCEDURE — 77010033678 HC OXYGEN DAILY

## 2018-11-25 PROCEDURE — 74011250636 HC RX REV CODE- 250/636: Performed by: INTERNAL MEDICINE

## 2018-11-25 PROCEDURE — 65660000000 HC RM CCU STEPDOWN

## 2018-11-25 PROCEDURE — 83540 ASSAY OF IRON: CPT

## 2018-11-25 PROCEDURE — 82746 ASSAY OF FOLIC ACID SERUM: CPT

## 2018-11-25 PROCEDURE — 85025 COMPLETE CBC W/AUTO DIFF WBC: CPT

## 2018-11-25 PROCEDURE — 74011250637 HC RX REV CODE- 250/637: Performed by: HOSPITALIST

## 2018-11-25 PROCEDURE — 36415 COLL VENOUS BLD VENIPUNCTURE: CPT

## 2018-11-25 PROCEDURE — 74011636637 HC RX REV CODE- 636/637: Performed by: HOSPITALIST

## 2018-11-25 PROCEDURE — 82607 VITAMIN B-12: CPT

## 2018-11-25 PROCEDURE — 85046 RETICYTE/HGB CONCENTRATE: CPT

## 2018-11-25 PROCEDURE — 74011000258 HC RX REV CODE- 258: Performed by: INTERNAL MEDICINE

## 2018-11-25 PROCEDURE — 82728 ASSAY OF FERRITIN: CPT

## 2018-11-25 PROCEDURE — 94760 N-INVAS EAR/PLS OXIMETRY 1: CPT

## 2018-11-25 PROCEDURE — 80048 BASIC METABOLIC PNL TOTAL CA: CPT

## 2018-11-25 PROCEDURE — 85018 HEMOGLOBIN: CPT

## 2018-11-25 RX ORDER — INSULIN LISPRO 100 [IU]/ML
0-10 INJECTION, SOLUTION INTRAVENOUS; SUBCUTANEOUS
Status: DISCONTINUED | OUTPATIENT
Start: 2018-11-25 | End: 2018-11-28

## 2018-11-25 RX ORDER — DEXTROSE 40 %
15 GEL (GRAM) ORAL AS NEEDED
Status: DISCONTINUED | OUTPATIENT
Start: 2018-11-25 | End: 2018-11-28 | Stop reason: HOSPADM

## 2018-11-25 RX ORDER — PANTOPRAZOLE SODIUM 40 MG/1
40 TABLET, DELAYED RELEASE ORAL
Status: DISCONTINUED | OUTPATIENT
Start: 2018-11-25 | End: 2018-11-28 | Stop reason: HOSPADM

## 2018-11-25 RX ORDER — LORAZEPAM 0.5 MG/1
0.5 TABLET ORAL
Status: DISCONTINUED | OUTPATIENT
Start: 2018-11-25 | End: 2018-11-28 | Stop reason: HOSPADM

## 2018-11-25 RX ORDER — DEXTROSE 50 % IN WATER (D50W) INTRAVENOUS SYRINGE
25-50 AS NEEDED
Status: DISCONTINUED | OUTPATIENT
Start: 2018-11-25 | End: 2018-11-28 | Stop reason: HOSPADM

## 2018-11-25 RX ORDER — HYDROCODONE BITARTRATE AND ACETAMINOPHEN 5; 325 MG/1; MG/1
1 TABLET ORAL
Status: DISCONTINUED | OUTPATIENT
Start: 2018-11-25 | End: 2018-11-28 | Stop reason: HOSPADM

## 2018-11-25 RX ADMIN — Medication 10 ML: at 14:00

## 2018-11-25 RX ADMIN — ISOSORBIDE MONONITRATE 30 MG: 30 TABLET, EXTENDED RELEASE ORAL at 09:37

## 2018-11-25 RX ADMIN — GABAPENTIN 300 MG: 300 CAPSULE ORAL at 22:56

## 2018-11-25 RX ADMIN — CARVEDILOL 12.5 MG: 12.5 TABLET, FILM COATED ORAL at 09:38

## 2018-11-25 RX ADMIN — PANTOPRAZOLE SODIUM 40 MG: 40 TABLET, DELAYED RELEASE ORAL at 17:13

## 2018-11-25 RX ADMIN — CEFTRIAXONE SODIUM 1 G: 1 INJECTION, POWDER, FOR SOLUTION INTRAMUSCULAR; INTRAVENOUS at 22:56

## 2018-11-25 RX ADMIN — Medication 10 ML: at 22:00

## 2018-11-25 RX ADMIN — INSULIN LISPRO 10 UNITS: 100 INJECTION, SOLUTION INTRAVENOUS; SUBCUTANEOUS at 22:00

## 2018-11-25 RX ADMIN — GABAPENTIN 300 MG: 300 CAPSULE ORAL at 17:13

## 2018-11-25 RX ADMIN — GABAPENTIN 300 MG: 300 CAPSULE ORAL at 09:38

## 2018-11-25 RX ADMIN — PANTOPRAZOLE SODIUM 40 MG: 40 TABLET, DELAYED RELEASE ORAL at 09:38

## 2018-11-25 RX ADMIN — Medication 10 ML: at 06:11

## 2018-11-25 RX ADMIN — PANTOPRAZOLE SODIUM 40 MG: 40 TABLET, DELAYED RELEASE ORAL at 06:09

## 2018-11-25 RX ADMIN — SODIUM CHLORIDE 100 ML/HR: 900 INJECTION, SOLUTION INTRAVENOUS at 06:11

## 2018-11-25 RX ADMIN — CARVEDILOL 12.5 MG: 12.5 TABLET, FILM COATED ORAL at 17:13

## 2018-11-25 NOTE — PROGRESS NOTES
Subjective:  
Daily Progress Note: 2018 9:42 AM 
No Complaints Objective:  
 
Visit Vitals /64 (BP 1 Location: Right arm, BP Patient Position: At rest) Pulse 70 Temp 98.8 °F (37.1 °C) Resp 16 Ht 5' 6\" (1.676 m) Wt 220 lb (99.8 kg) SpO2 100% BMI 35.51 kg/m² O2 Flow Rate (L/min): 2 l/min O2 Device: Nasal cannula Temp (24hrs), Av.1 °F (36.7 °C), Min:97.3 °F (36.3 °C), Max:98.8 °F (37.1 °C) 
 
 
1901 -  07 In: [id number] Out: Keith Bates [[id number]] 701 - 1900 In: 120 [P.O.:120] Out: -  
 
[unfilled] [unfilled] [unfilled] Exam: urine is dark ambert Data Review Urine culture shows >100k GNR Recent Results (from the past 24 hour(s)) CBC WITH AUTOMATED DIFF Collection Time: 18  6:27 AM  
Result Value Ref Range WBC 3.6 (L) 4.3 - 11.1 K/uL  
 RBC 2.71 (L) 4.05 - 5.2 M/uL HGB 7.2 (L) 11.7 - 15.4 g/dL HCT 22.8 (L) 35.8 - 46.3 % MCV 84.1 79.6 - 97.8 FL  
 MCH 26.6 26.1 - 32.9 PG  
 MCHC 31.6 31.4 - 35.0 g/dL  
 RDW 14.2 % PLATELET 288 (L) 693 - 450 K/uL MPV 13.2 (H) 9.4 - 12.3 FL ABSOLUTE NRBC 0.00 0.0 - 0.2 K/uL  
 DF AUTOMATED NEUTROPHILS 72 43 - 78 % LYMPHOCYTES 14 13 - 44 % MONOCYTES 11 4.0 - 12.0 % EOSINOPHILS 3 0.5 - 7.8 % BASOPHILS 0 0.0 - 2.0 % IMMATURE GRANULOCYTES 1 0.0 - 5.0 %  
 ABS. NEUTROPHILS 2.6 1.7 - 8.2 K/UL  
 ABS. LYMPHOCYTES 0.5 0.5 - 4.6 K/UL  
 ABS. MONOCYTES 0.4 0.1 - 1.3 K/UL  
 ABS. EOSINOPHILS 0.1 0.0 - 0.8 K/UL  
 ABS. BASOPHILS 0.0 0.0 - 0.2 K/UL  
 ABS. IMM. GRANS. 0.0 0.0 - 0.5 K/UL METABOLIC PANEL, BASIC Collection Time: 18  6:27 AM  
Result Value Ref Range Sodium 129 (L) 136 - 145 mmol/L Potassium 3.7 3.5 - 5.1 mmol/L Chloride 90 (L) 98 - 107 mmol/L  
 CO2 30 21 - 32 mmol/L Anion gap 9 7 - 16 mmol/L Glucose 148 (H) 65 - 100 mg/dL  (H) 8 - 23 MG/DL Creatinine 1.99 (H) 0.6 - 1.0 MG/DL  
 GFR est AA 31 (L) >60 ml/min/1.73m2 GFR est non-AA 25 (L) >60 ml/min/1.73m2 Calcium 9.7 8.3 - 10.4 MG/DL Assessment Principal Problem: Hyponatremia (9/7/2018) Active Problems: 
  Persistent atrial fibrillation ([address] (10/26/2009) Essential hypertension (10/26/2009) Overview: At goal. Continue current tx Morbid obesity ([address] (6/20/2012) Pulmonary HTN- RVSP 51 (11/13/2012) Overview: On sildenafil 
    04/2010:  PASP 60mmHg 04/2011:  PASP 80mmHg 10/2012:  PASP 60mmHg - severely dilated RV and severely reduced RV  
    function 08/2013:  PASP 60mmHg 
    05/2014:  PASP 60mmHg - Dilated RV and mild HK 
    10/2015:  PASP 62mmHg - Dilated RV and mild HK  
    11/2017:  PASP 36mmHg - Dilated RV and mild HK Sleep apnea - noncompliant with CPAP (11/13/2012) Overview: Pt needs supplies. Chronic combined systolic and diastolic heart failure ([facility] (11/14/2012) Overview: 07/2010 - 45% 04/2011:  EF 55% 10/2012:  EF 50% 08/2013:  EF 50% 05/2014:  EF 50% 10/2015:  EF 50% 11/2017:  EF 55% Anemia (11/14/2012) Overview: Last Hgb improved to normal. Check today. DNR (do not resuscitate) (11/23/2012) Stage 4 chronic kidney disease ([address] (12/3/2012) Overview: Follow closely [facility]. Last GFR now at 22 (from 32). Acute blood loss anemia (11/23/2018) Cardiomyopathy ([facility] (11/25/2009) Overview: Following with Cardiology. Lymphedema of both lower extremities (4/11/2018) Overview: Pt on maximum diuretic therapy; still with symptomatic bilateral LE edema;  
    refer to PT to evaluated for lymphedema therapy. Bedbound (8/21/2018) Hematuria (11/23/2018) Acute cystitis with hematuria (11/23/2018) Plan:  Gross hematuria now resolved. Urine culture shows >100k GNR. Continue abx.

## 2018-11-25 NOTE — PROGRESS NOTES
Progress Note   
2018 Admit Date: 2018  8:20 PM  
NAME: Mary Kay Landeros :  1935 MRN:  [id number] Attending: Flaco Koo MD 
PCP:  Daniel Suarez MD 
Treatment Team: Attending Provider: Flaco Koo MD; Consulting Provider: Reza Wade MD; Charge Nurse: Manjula Reinoso 
 
DNR  
SUBJECTIVE:  
Ms. Kemper Goldmann is a 81 yo female with PMH of afib on xarelto, CKD, CVA, systolic CHF, DVT, cardiomyopathy, DM, DEWAYNE, HTN, JOÃO, pulmonary HTN, who was on home hospice who presented with c/o suprapubic lower abd cramping X3-4 days. She was found to have blood clots in her urine in the ER. Pt UA consistent with UTI. She was started on Rocephin. Harper placed and CBI started. Rocephin started. Urology consulted. Xarelto held. Pt also found to be hyponatremic. Pt a little drowsy during exam but oriented X3. Denies CP, SOB. Still with some lower abd pain. Today, no fever, no weakness or SOB or dizziness, no bleeding PHYSICAL EXAM  
 
Visit Vitals /73 (BP 1 Location: Right arm, BP Patient Position: At rest) Pulse 74 Temp 97.5 °F (36.4 °C) Resp 16 Ht 5' 6\" (1.676 m) Wt 99.8 kg (220 lb) SpO2 100% BMI 35.51 kg/m² Temp (24hrs), Av.2 °F (36.8 °C), Min:97.5 °F (36.4 °C), Max:98.8 °F (37.1 °C) Oxygen Therapy O2 Sat (%): 100 % (18 1213) Pulse via Oximetry: 77 beats per minute (18 1006) O2 Device: Nasal cannula (18 1006) O2 Flow Rate (L/min): 1 l/min (18 1006) Intake/Output Summary (Last 24 hours) at 2018 1516 Last data filed at 2018 1407 Gross per 24 hour Intake 9360 ml Output 97407 ml Net -1640 ml General: Pale, mild acute distress   
Lungs: CTA bilaterally. Resp even and nonlabored Heart:  S1S2 present without murmurs rubs gallops. RRR. Chronic lymphedema Abdomen: Soft, non distended. Suprapubic tenderness on palpation. Extremities: No cyanosis. Moves ext spontaneously. Neurologic:  Awake alert, oriented X3. No focal deficits. Results summary of Diagnostic Studies/Procedures copied from within [facility] EMR: 
 
 
ASSESSMENT Active Hospital Problems Diagnosis Date Noted  Hematuria 11/23/2018  Acute cystitis with hematuria 11/23/2018  Acute blood loss anemia 11/23/2018  Hyponatremia 09/07/2018  Bedbound 08/21/2018  Lymphedema of both lower extremities 04/11/2018 Pt on maximum diuretic therapy; still with symptomatic bilateral LE edema; refer to PT to evaluated for lymphedema therapy.  Stage 4 chronic kidney disease ([address] 12/03/2012 Follow closely [facility]ph[facility]. Last GFR now at 22 (from 32).  DNR (do not resuscitate) 11/23/2012  Chronic combined systolic and diastolic heart failure ([address] 11/14/2012 07/2010 - 45% 04/2011:  EF 55% 10/2012:  EF 50% 08/2013:  EF 50% 05/2014:  EF 50% 10/2015:  EF 50% 11/2017:  EF 55%  Anemia 11/14/2012 Last Hgb improved to normal. Check today.  Sleep apnea - noncompliant with CPAP 11/13/2012 Pt needs supplies.  Pulmonary HTN- RVSP 51 11/13/2012 On sildenafil 
04/2010:  PASP 60mmHg 04/2011:  PASP 80mmHg 10/2012:  PASP 60mmHg - severely dilated RV and severely reduced RV function 08/2013:  PASP 60mmHg 
05/2014:  PASP 60mmHg - Dilated RV and mild HK 
10/2015:  PASP 62mmHg - Dilated RV and mild HK  
11/2017:  PASP 36mmHg - Dilated RV and mild HK  Morbid obesity ([address] 06/20/2012  Cardiomyopathy ([address] 11/25/2009 Following with Cardiology.  Essential hypertension 10/26/2009 At goal. Continue current tx  Persistent atrial fibrillation ([address] 10/26/2009 Plan: UTI:  Follow urine cx/ grow G-ve rods. Continue rocephin Hematuria:  Urology consulted. Continue CBI. Holding xarelto. Hb down to 7 today. Pt agrres to wait and keep monitoring and hold on transfusion since she is asymptomatic. Avoid anticoag. PPI bid po.  Follow work up for anemia PAF:  Holding xarelto Hyponatremia:  Improving. Continue IVF. BMP in AM 
 
HTN:  Stable, continue current regimen Chronic combined systolic diastolic HF: Echo Sep 9627 with EF 55%. Continue current regimen.   
 
 
Dianne Villanueva MD

## 2018-11-26 PROBLEM — E11.65 UNCONTROLLED TYPE 2 DIABETES MELLITUS WITH HYPERGLYCEMIA (HCC): Status: ACTIVE | Noted: 2018-11-26

## 2018-11-26 LAB
EST. AVERAGE GLUCOSE BLD GHB EST-MCNC: 186 MG/DL
GLUCOSE BLD STRIP.AUTO-MCNC: 150 MG/DL (ref 65–100)
GLUCOSE BLD STRIP.AUTO-MCNC: 200 MG/DL (ref 65–100)
GLUCOSE BLD STRIP.AUTO-MCNC: 258 MG/DL (ref 65–100)
GLUCOSE BLD STRIP.AUTO-MCNC: 280 MG/DL (ref 65–100)
HBA1C MFR BLD: 8.1 % (ref 4.8–6)
HGB BLD-MCNC: 7 G/DL (ref 11.7–15.4)

## 2018-11-26 PROCEDURE — 36415 COLL VENOUS BLD VENIPUNCTURE: CPT

## 2018-11-26 PROCEDURE — 85018 HEMOGLOBIN: CPT

## 2018-11-26 PROCEDURE — 74011250637 HC RX REV CODE- 250/637: Performed by: INTERNAL MEDICINE

## 2018-11-26 PROCEDURE — 82962 GLUCOSE BLOOD TEST: CPT

## 2018-11-26 PROCEDURE — 83036 HEMOGLOBIN GLYCOSYLATED A1C: CPT

## 2018-11-26 PROCEDURE — 74011636637 HC RX REV CODE- 636/637: Performed by: INTERNAL MEDICINE

## 2018-11-26 PROCEDURE — 74011250636 HC RX REV CODE- 250/636: Performed by: INTERNAL MEDICINE

## 2018-11-26 PROCEDURE — 77010033678 HC OXYGEN DAILY

## 2018-11-26 PROCEDURE — 74011000258 HC RX REV CODE- 258: Performed by: INTERNAL MEDICINE

## 2018-11-26 PROCEDURE — 65660000000 HC RM CCU STEPDOWN

## 2018-11-26 PROCEDURE — 74011636637 HC RX REV CODE- 636/637: Performed by: HOSPITALIST

## 2018-11-26 PROCEDURE — 74011250637 HC RX REV CODE- 250/637: Performed by: HOSPITALIST

## 2018-11-26 PROCEDURE — 94760 N-INVAS EAR/PLS OXIMETRY 1: CPT

## 2018-11-26 RX ORDER — INSULIN GLARGINE 100 [IU]/ML
10 INJECTION, SOLUTION SUBCUTANEOUS
Status: DISCONTINUED | OUTPATIENT
Start: 2018-11-26 | End: 2018-11-28

## 2018-11-26 RX ADMIN — PANTOPRAZOLE SODIUM 40 MG: 40 TABLET, DELAYED RELEASE ORAL at 09:32

## 2018-11-26 RX ADMIN — Medication 10 ML: at 23:47

## 2018-11-26 RX ADMIN — INSULIN LISPRO 4 UNITS: 100 INJECTION, SOLUTION INTRAVENOUS; SUBCUTANEOUS at 23:45

## 2018-11-26 RX ADMIN — INSULIN GLARGINE 10 UNITS: 100 INJECTION, SOLUTION SUBCUTANEOUS at 23:45

## 2018-11-26 RX ADMIN — ZOLPIDEM TARTRATE 5 MG: 5 TABLET ORAL at 01:28

## 2018-11-26 RX ADMIN — ISOSORBIDE MONONITRATE 30 MG: 30 TABLET, EXTENDED RELEASE ORAL at 09:37

## 2018-11-26 RX ADMIN — INSULIN LISPRO 6 UNITS: 100 INJECTION, SOLUTION INTRAVENOUS; SUBCUTANEOUS at 16:20

## 2018-11-26 RX ADMIN — INSULIN LISPRO 6 UNITS: 100 INJECTION, SOLUTION INTRAVENOUS; SUBCUTANEOUS at 12:46

## 2018-11-26 RX ADMIN — GABAPENTIN 300 MG: 300 CAPSULE ORAL at 15:34

## 2018-11-26 RX ADMIN — PANTOPRAZOLE SODIUM 40 MG: 40 TABLET, DELAYED RELEASE ORAL at 15:34

## 2018-11-26 RX ADMIN — INSULIN LISPRO 2 UNITS: 100 INJECTION, SOLUTION INTRAVENOUS; SUBCUTANEOUS at 09:33

## 2018-11-26 RX ADMIN — GABAPENTIN 300 MG: 300 CAPSULE ORAL at 23:45

## 2018-11-26 RX ADMIN — Medication 10 ML: at 05:59

## 2018-11-26 RX ADMIN — CEFTRIAXONE SODIUM 1 G: 1 INJECTION, POWDER, FOR SOLUTION INTRAMUSCULAR; INTRAVENOUS at 23:45

## 2018-11-26 RX ADMIN — GABAPENTIN 300 MG: 300 CAPSULE ORAL at 09:32

## 2018-11-26 RX ADMIN — Medication 10 ML: at 13:08

## 2018-11-26 RX ADMIN — CARVEDILOL 12.5 MG: 12.5 TABLET, FILM COATED ORAL at 16:42

## 2018-11-26 RX ADMIN — CARVEDILOL 12.5 MG: 12.5 TABLET, FILM COATED ORAL at 09:32

## 2018-11-26 NOTE — PROGRESS NOTES
Hospitalist Progress Note Subjective:  
Daily Progress Note: 11/26/2018 12:19 PM 
 
Ms. Shweta Short is an 79 yo AAF, with a pmh of chronic chf and atrial fibrillation, who presented 11/23 from home for increased fatigue/weakness and hematuria. Clots in urine in ED and du placed for CBI. Urine culture growing >100K GNRs and she is on iv rocephin. Hematuria has mostly resolved. Her sodium was low on admission and diuretics held and NS IVFs administered. Her post prandial blood sugars are elevated and her A1C is 8.1%. She takes amaryl at home. She is feeling improved. Denies chest pain, sob.  at bedside. Current Facility-Administered Medications Medication Dose Route Frequency  insulin glargine (LANTUS) injection 10 Units  10 Units SubCUTAneous QHS  pantoprazole (PROTONIX) tablet 40 mg  40 mg Oral ACB&D  
 HYDROcodone-acetaminophen (NORCO) 5-325 mg per tablet 1 Tab  1 Tab Oral TID PRN  
 LORazepam (ATIVAN) tablet 0.5 mg  0.5 mg Oral BID PRN  
 insulin lispro (HUMALOG) injection 0-10 Units  0-10 Units SubCUTAneous AC&HS  
 dextrose 40% (GLUTOSE) oral gel 1 Tube  15 g Oral PRN  
 glucagon (GLUCAGEN) injection 1 mg  1 mg IntraMUSCular PRN  
 dextrose (D50W) injection syrg 12.5-25 g  25-50 mL IntraVENous PRN  
 carvedilol (COREG) tablet 12.5 mg  12.5 mg Oral BID WITH MEALS  gabapentin (NEURONTIN) capsule 300 mg  300 mg Oral TID  isosorbide mononitrate ER (IMDUR) tablet 30 mg  30 mg Oral DAILY  sodium chloride (NS) flush 5-10 mL  5-10 mL IntraVENous Q8H  
 sodium chloride (NS) flush 5-10 mL  5-10 mL IntraVENous PRN  
 acetaminophen (TYLENOL) tablet 650 mg  650 mg Oral Q4H PRN  
 naloxone (NARCAN) injection 0.4 mg  0.4 mg IntraVENous PRN  
 ondansetron (ZOFRAN) injection 4 mg  4 mg IntraVENous Q4H PRN  
 senna-docusate (PERICOLACE) 8.6-50 mg per tablet 2 Tab  2 Tab Oral DAILY PRN  
 zolpidem (AMBIEN) tablet 5 mg  5 mg Oral QHS PRN  
  hydrALAZINE (APRESOLINE) 20 mg/mL injection 20 mg  20 mg IntraVENous Q4H PRN  
 alum-mag hydroxide-simeth (MYLANTA) oral suspension 30 mL  30 mL Oral Q4H PRN  
 cefTRIAXone (ROCEPHIN) 1 g in 0.9% sodium chloride (MBP/ADV) 50 mL  1 g IntraVENous Q24H Review of Systems A comprehensive review of systems was negative except for that written in the HPI. Objective:  
 
Visit Vitals /64 Pulse 73 Temp 98.3 °F (36.8 °C) Resp 18 Ht 5' 6\" (1.676 m) Wt 99.8 kg (220 lb) SpO2 98% BMI 35.51 kg/m² O2 Flow Rate (L/min): 1 l/min O2 Device: Nasal cannula Temp (24hrs), Av.8 °F (36.6 °C), Min:95.5 °F (35.3 °C), Max:98.6 °F (37 °C) No intake/output data recorded.  1901 -  0700 In: Kasey Alu [P.O.:360] Out: 7300 [[id number]] General: awake, alert, oriented to person and place Eyes; non icteric, EOMI Neck; supple CV: IRIR, normal rate Pulm; diminished in bases, course, non labored Abd; soft, non tender, non distended, active bowel sounds Additional comments:I reviewed the patient's new clinical lab test results. j 
 
Data Review Recent Results (from the past 24 hour(s)) TRANSFERRIN SATURATION Collection Time: 18  2:09 PM  
Result Value Ref Range Iron 39 35 - 150 ug/dL TIBC 232 (L) 250 - 450 ug/dL Transferrin Saturation 17 (L) >20 % TRANSFERRIN Collection Time: 18  2:09 PM  
Result Value Ref Range Transferrin 180 (L) 202 - 364 mg/dL FOLATE Collection Time: 18  2:09 PM  
Result Value Ref Range Folate 8.6 3.1 - 17.5 ng/mL VITAMIN B12 Collection Time: 18  2:09 PM  
Result Value Ref Range Vitamin B12 1,011 (H) 193 - 986 pg/mL FERRITIN Collection Time: 18  2:09 PM  
Result Value Ref Range Ferritin 822 (H) 8 - 388 NG/ML  
RETICULOCYTE COUNT Collection Time: 18  2:09 PM  
Result Value Ref Range Reticulocyte count 1.4 0.3 - 2.0 % Absolute Retic Cnt. 0.0370 0.026 - 0.095 M/ul Immature Retic Fraction 8.5 3.0 - 15.9 % Retic Hgb Conc. 28 (L) 29 - 35 pg HEMOGLOBIN Collection Time: 11/25/18  2:11 PM  
Result Value Ref Range HGB 7.0 (L) 11.7 - 15.4 g/dL IRON Collection Time: 11/25/18  3:30 PM  
Result Value Ref Range Iron 40 35 - 150 ug/dL GLUCOSE, POC Collection Time: 11/25/18  9:10 PM  
Result Value Ref Range Glucose (POC) 354 (H) 65 - 100 mg/dL HEMOGLOBIN Collection Time: 11/26/18  5:26 AM  
Result Value Ref Range HGB 7.0 (L) 11.7 - 15.4 g/dL HEMOGLOBIN A1C WITH EAG Collection Time: 11/26/18  5:26 AM  
Result Value Ref Range Hemoglobin A1c 8.1 (H) 4.8 - 6.0 % Est. average glucose 186 mg/dL GLUCOSE, POC Collection Time: 11/26/18  8:06 AM  
Result Value Ref Range Glucose (POC) 150 (H) 65 - 100 mg/dL GLUCOSE, POC Collection Time: 11/26/18 11:45 AM  
Result Value Ref Range Glucose (POC) 258 (H) 65 - 100 mg/dL Assessment/Plan:  
 
Principal Problem: Hyponatremia (9/7/2018) Diuretics on hold. No BMP from this morning, will recheck. Active Problems: 
  Persistent atrial fibrillation ([facility] (10/26/2009) Essential hypertension (10/26/2009) Overview: At goal. Continue current tx Morbid obesity ([facility] (6/20/2012) Pulmonary HTN- RVSP 51 (11/13/2012) Overview: On sildenafil 
    04/2010:  PASP 60mmHg 04/2011:  PASP 80mmHg 10/2012:  PASP 60mmHg - severely dilated RV and severely reduced RV  
    function 08/2013:  PASP 60mmHg 
    05/2014:  PASP 60mmHg - Dilated RV and mild HK 
    10/2015:  PASP 62mmHg - Dilated RV and mild HK  
    11/2017:  PASP 36mmHg - Dilated RV and mild HK Sleep apnea - noncompliant with CPAP (11/13/2012) Overview: Pt needs supplies. Chronic combined systolic and diastolic heart failure ([facility] (11/14/2012) Overview: 07/2010 - 45% 04/2011:  EF 55% 10/2012:  EF 50% 08/2013:  EF 50% 05/2014:  EF 50% 10/2015:  EF 50% 11/2017:  EF 55% Anemia (11/14/2012) Overview: Last Hgb improved to normal. Check today. DNR (do not resuscitate) (11/23/2012) Stage 4 chronic kidney disease ([facility] (12/3/2012) Overview: Follow closely [facility]. Last GFR now at 22 (from 32). Acute blood loss anemia (11/23/2018) Due to hematuria, mostly resolved. xarelto held. Cardiomyopathy ([facility] (11/25/2009) Overview: Following with Cardiology. Lymphedema of both lower extremities (4/11/2018) Overview: Pt on maximum diuretic therapy; still with symptomatic bilateral LE edema;  
    refer to PT to evaluated for lymphedema therapy. Bedbound (8/21/2018) Hematuria (11/23/2018) Acute cystitis with hematuria (11/23/2018) On iv rocephin. Will change to po when urine culture finalizes. Uncontrolled type 2 diabetes mellitus with hyperglycemia ([facility] (11/26/2018) A1C is 8.1% on oral agents. Will start lantus 10 units daily with SSI. Consult diabetic educator. Patient states this is NOT a new dx. Possibly home tomorrow with home hospice on oral abx? Care Plan discussed with: Patient/Family and  Signed By: Bridgette Gong MD   
 November 26, 2018

## 2018-11-26 NOTE — PROGRESS NOTES
Pt current with [facility]. Per pt's daughter, Florence Uribe, [phone] pt will be returning home and resuming care with [facility]. PCP and insurance verified. No DME or O2 needs. CM will continue to follow. Care Management Interventions PCP Verified by CM: Yes Transition of Care Consult (CM Consult): Discharge Planning Current Support Network: Own Home Confirm Follow Up Transport: Family Plan discussed with Pt/Family/Caregiver: Yes Freedom of Choice Offered: Yes Discharge Location Discharge Placement: Home with hospice

## 2018-11-26 NOTE — PROGRESS NOTES
Admit Date: 11/23/2018 Subjective: Alvino Short is resting. Harper catheter draining clear yellow urine. Objective:  
 
Patient Vitals for the past 8 hrs: 
 BP Temp Pulse Resp SpO2  
11/26/18 1206 124/64 98.3 °F (36.8 °C) 73 18 98 % 11/26/18 0707 129/70 97.9 °F (36.6 °C) 68 16 100 % 11/26/18 0438 112/56 97.9 °F (36.6 °C) 71 18 100 % No intake/output data recorded. 11/24 1901 - 11/26 0700 In: Cece Garza [P.O.:360] Out: 7300 [[id number]] Physical Exam: 
GENERAL: sleeping LUNG: clear to auscultation bilaterally HEART: regular rate and rhythm, S1, S2  
ABDOMEN: soft, non-tender. Active BS 
NEUROLOGIC: sleeping Data Review Recent Results (from the past 24 hour(s)) TRANSFERRIN SATURATION Collection Time: 11/25/18  2:09 PM  
Result Value Ref Range Iron 39 35 - 150 ug/dL TIBC 232 (L) 250 - 450 ug/dL Transferrin Saturation 17 (L) >20 % TRANSFERRIN Collection Time: 11/25/18  2:09 PM  
Result Value Ref Range Transferrin 180 (L) 202 - 364 mg/dL FOLATE Collection Time: 11/25/18  2:09 PM  
Result Value Ref Range Folate 8.6 3.1 - 17.5 ng/mL VITAMIN B12 Collection Time: 11/25/18  2:09 PM  
Result Value Ref Range Vitamin B12 1,011 (H) 193 - 986 pg/mL FERRITIN Collection Time: 11/25/18  2:09 PM  
Result Value Ref Range Ferritin 822 (H) 8 - 388 NG/ML  
RETICULOCYTE COUNT Collection Time: 11/25/18  2:09 PM  
Result Value Ref Range Reticulocyte count 1.4 0.3 - 2.0 % Absolute Retic Cnt. 0.0370 0.026 - 0.095 M/ul Immature Retic Fraction 8.5 3.0 - 15.9 % Retic Hgb Conc. 28 (L) 29 - 35 pg HEMOGLOBIN Collection Time: 11/25/18  2:11 PM  
Result Value Ref Range HGB 7.0 (L) 11.7 - 15.4 g/dL IRON Collection Time: 11/25/18  3:30 PM  
Result Value Ref Range Iron 40 35 - 150 ug/dL GLUCOSE, POC Collection Time: 11/25/18  9:10 PM  
Result Value Ref Range Glucose (POC) 354 (H) 65 - 100 mg/dL HEMOGLOBIN  
 Collection Time: 11/26/18  5:26 AM  
Result Value Ref Range HGB 7.0 (L) 11.7 - 15.4 g/dL HEMOGLOBIN A1C WITH EAG Collection Time: 11/26/18  5:26 AM  
Result Value Ref Range Hemoglobin A1c 8.1 (H) 4.8 - 6.0 % Est. average glucose 186 mg/dL GLUCOSE, POC Collection Time: 11/26/18  8:06 AM  
Result Value Ref Range Glucose (POC) 150 (H) 65 - 100 mg/dL GLUCOSE, POC Collection Time: 11/26/18 11:45 AM  
Result Value Ref Range Glucose (POC) 258 (H) 65 - 100 mg/dL Assessment:  
 
Principal Problem: Hyponatremia (9/7/2018) Active Problems: 
  Persistent atrial fibrillation ([address] (10/26/2009) Essential hypertension (10/26/2009) Overview: At goal. Continue current tx Morbid obesity ([facility] (6/20/2012) Pulmonary HTN- RVSP 51 (11/13/2012) Overview: On sildenafil 
    04/2010:  PASP 60mmHg 04/2011:  PASP 80mmHg 10/2012:  PASP 60mmHg - severely dilated RV and severely reduced RV  
    function 08/2013:  PASP 60mmHg 
    05/2014:  PASP 60mmHg - Dilated RV and mild HK 
    10/2015:  PASP 62mmHg - Dilated RV and mild HK  
    11/2017:  PASP 36mmHg - Dilated RV and mild HK Sleep apnea - noncompliant with CPAP (11/13/2012) Overview: Pt needs supplies. Chronic combined systolic and diastolic heart failure ([facility] [address] (11/14/2012) Overview: 07/2010 - 45% 04/2011:  EF 55% 10/2012:  EF 50% 08/2013:  EF 50% 05/2014:  EF 50% 10/2015:  EF 50% 11/2017:  EF 55% Anemia (11/14/2012) Overview: Last Hgb improved to normal. Check today. DNR (do not resuscitate) (11/23/2012) Stage 4 chronic kidney disease ([address] (12/3/2012) Overview: Follow closely [facility]. Last GFR now at 22 (from 32). Acute blood loss anemia (11/23/2018) Cardiomyopathy ([address] (11/25/2009) Overview: Following with Cardiology. Lymphedema of both lower extremities (4/11/2018) Overview: Pt on maximum diuretic therapy; still with symptomatic bilateral LE edema;  
    refer to PT to evaluated for lymphedema therapy. Bedbound (8/21/2018) Hematuria (11/23/2018) Acute cystitis with hematuria (11/23/2018) Uncontrolled type 2 diabetes mellitus with hyperglycemia ([facility] [address] (11/26/2018) 80-year-old female with hx of A-fib and DVT on xarelto. Recently on hospice care for end stage CHF. Here with gross hematuria with 3-way du and CBI started 11/24. UA +for nitrites, +4 bacteria >100 RBC, 5-10 WBC. Urine culture 11/23- >100,000 colonies GNR. On IV rocephin. Hgb 7.0 Afebrile, VSS Plan:  
 
Hematuria resolved, urine is clear today. Continue IV rocephin and follow final urine cultures as primary team is doing. Would recommend at least 7 days of PO antibiotics at time of d/c. Remove du today and monitor urine color/amount. Will continue to follow along. Patient is seen and examined in collaboration with Dr. Mark Avalos. Assessment and plan as per Dr. Marybeth Agosto. Yanni Wahl, VONNIE [facility] I have reviewed the above note and examined the patient. I agree with the exam, assessment and plan.  
 
Ramonita Donis MD

## 2018-11-26 NOTE — PROGRESS NOTES
Interdisciplinary Rounds completed 11/26/18. Nursing, Case Management, Physician and PT present. Plan of care reviewed and updated. Order to d/c du today. Should be ready for d/c in am.  Will continue hospice at home for COPD>

## 2018-11-26 NOTE — PROGRESS NOTES
Initial visit by  to convey care and concern and encourage patient that  services are available if desired. No needs were voiced during the visit. Provided business card for future reference. Carlos Londono MDiv Board Certified [employer]

## 2018-11-26 NOTE — PROGRESS NOTES
Problem: Falls - Risk of 
Goal: *Absence of Falls Document Ines Brownlee Fall Risk and appropriate interventions in the flowsheet. Outcome: Progressing Towards Goal 
Fall Risk Interventions: 
  
 
Mentation Interventions: Adequate sleep, hydration, pain control, Door open when patient unattended, Evaluate medications/consider consulting pharmacy, Family/sitter at bedside Medication Interventions: Evaluate medications/consider consulting pharmacy Elimination Interventions: Call light in reach History of Falls Interventions: Evaluate medications/consider consulting pharmacy

## 2018-11-26 NOTE — DIABETES MGMT
Patient admitted with hyponatremia, seen by diabetes educator. Patient is on hospice with her end stage CHF. Patient was unable to state what medications she was taking for her diabetes at home. Patient states she has taken insulin in the past for about three years-a once a day injection-but patient states it has been a while since she took it. Per her PCP Dr. Korey Lutz note they stopped oral hypoglycemics due to her poor renal function and planned on starting patient on insulin. A1c in September was 8.2. Current A1c 8.1-was likely skewed by Hgb of 7. Patient reports she was checking her blood glucose daily and it was running in the upper 100's to 200's. Blood glucose 282 on admission. Blood glucose ranged 148-354 yesterday with patient receiving Humalog 10 units. Blood glucose 150 this morning. Plan per provider is to send patient home on once a day Lantus injection which starts tonight. Patient states she is okay with this, but then asked how long she would have to take it. Educated patient that this is to be determined by her PCP. Offered patient insulin instruction with both pen and vial and syringe. Patient refused insulin pen instruction. \" I want the bottle that is was I did before. \" Patient was able to demonstrate insulin injection using vial and syringe with much difficulty. She stuck herself in the hand with the needle and did not inject the air into the vial without prompting. Asked patient if there was someone else in the family she would like us to teach in case she wasn't up to injecting herself. \" No, I believe I will do better on my own at home with no one else around. \" Notified case management. They recommended calling patient's daughter, Dino Bean. Attempted call but there was no answer. Will have primary RN have patient continue to practice self-injection when due to see if technique improves.

## 2018-11-27 LAB
ANION GAP SERPL CALC-SCNC: 8 MMOL/L (ref 7–16)
BACTERIA SPEC CULT: ABNORMAL
BASOPHILS # BLD: 0 K/UL (ref 0–0.2)
BASOPHILS NFR BLD: 0 % (ref 0–2)
BUN SERPL-MCNC: 155 MG/DL (ref 8–23)
CALCIUM SERPL-MCNC: 10.1 MG/DL (ref 8.3–10.4)
CHLORIDE SERPL-SCNC: 90 MMOL/L (ref 98–107)
CO2 SERPL-SCNC: 34 MMOL/L (ref 21–32)
CREAT SERPL-MCNC: 1.47 MG/DL (ref 0.6–1)
DIFFERENTIAL METHOD BLD: ABNORMAL
EOSINOPHIL # BLD: 0.1 K/UL (ref 0–0.8)
EOSINOPHIL NFR BLD: 3 % (ref 0.5–7.8)
ERYTHROCYTE [DISTWIDTH] IN BLOOD BY AUTOMATED COUNT: 14.2 %
GLUCOSE BLD STRIP.AUTO-MCNC: 112 MG/DL (ref 65–100)
GLUCOSE BLD STRIP.AUTO-MCNC: 148 MG/DL (ref 65–100)
GLUCOSE BLD STRIP.AUTO-MCNC: 195 MG/DL (ref 65–100)
GLUCOSE BLD STRIP.AUTO-MCNC: 199 MG/DL (ref 65–100)
GLUCOSE SERPL-MCNC: 96 MG/DL (ref 65–100)
HCT VFR BLD AUTO: 21.6 % (ref 35.8–46.3)
HCT VFR BLD AUTO: 29.3 % (ref 35.8–46.3)
HGB BLD-MCNC: 6.7 G/DL (ref 11.7–15.4)
HGB BLD-MCNC: 9.4 G/DL (ref 11.7–15.4)
IMM GRANULOCYTES # BLD: 0 K/UL (ref 0–0.5)
IMM GRANULOCYTES NFR BLD AUTO: 1 % (ref 0–5)
LYMPHOCYTES # BLD: 0.6 K/UL (ref 0.5–4.6)
LYMPHOCYTES NFR BLD: 17 % (ref 13–44)
MAGNESIUM SERPL-MCNC: 2 MG/DL (ref 1.8–2.4)
MCH RBC QN AUTO: 26.7 PG (ref 26.1–32.9)
MCHC RBC AUTO-ENTMCNC: 31 G/DL (ref 31.4–35)
MCV RBC AUTO: 86.1 FL (ref 79.6–97.8)
MONOCYTES # BLD: 0.4 K/UL (ref 0.1–1.3)
MONOCYTES NFR BLD: 12 % (ref 4–12)
NEUTS SEG # BLD: 2.4 K/UL (ref 1.7–8.2)
NEUTS SEG NFR BLD: 68 % (ref 43–78)
NRBC # BLD: 0 K/UL (ref 0–0.2)
PHOSPHATE SERPL-MCNC: 2.7 MG/DL (ref 2.3–3.7)
PLATELET # BLD AUTO: 143 K/UL (ref 150–450)
PMV BLD AUTO: 12.5 FL (ref 9.4–12.3)
POTASSIUM SERPL-SCNC: 3 MMOL/L (ref 3.5–5.1)
RBC # BLD AUTO: 2.51 M/UL (ref 4.05–5.2)
SERVICE CMNT-IMP: ABNORMAL
SODIUM SERPL-SCNC: 132 MMOL/L (ref 136–145)
WBC # BLD AUTO: 3.5 K/UL (ref 4.3–11.1)

## 2018-11-27 PROCEDURE — 74011250637 HC RX REV CODE- 250/637: Performed by: INTERNAL MEDICINE

## 2018-11-27 PROCEDURE — 80048 BASIC METABOLIC PNL TOTAL CA: CPT

## 2018-11-27 PROCEDURE — 86901 BLOOD TYPING SEROLOGIC RH(D): CPT

## 2018-11-27 PROCEDURE — 65660000000 HC RM CCU STEPDOWN

## 2018-11-27 PROCEDURE — 83735 ASSAY OF MAGNESIUM: CPT

## 2018-11-27 PROCEDURE — 85025 COMPLETE CBC W/AUTO DIFF WBC: CPT

## 2018-11-27 PROCEDURE — 85014 HEMATOCRIT: CPT

## 2018-11-27 PROCEDURE — 30233N1 TRANSFUSION OF NONAUTOLOGOUS RED BLOOD CELLS INTO PERIPHERAL VEIN, PERCUTANEOUS APPROACH: ICD-10-PCS | Performed by: INTERNAL MEDICINE

## 2018-11-27 PROCEDURE — 74011250636 HC RX REV CODE- 250/636: Performed by: INTERNAL MEDICINE

## 2018-11-27 PROCEDURE — 94760 N-INVAS EAR/PLS OXIMETRY 1: CPT

## 2018-11-27 PROCEDURE — 77030020263 HC SOL INJ SOD CL0.9% LFCR 1000ML

## 2018-11-27 PROCEDURE — 36430 TRANSFUSION BLD/BLD COMPNT: CPT

## 2018-11-27 PROCEDURE — 74011250637 HC RX REV CODE- 250/637: Performed by: HOSPITALIST

## 2018-11-27 PROCEDURE — 84100 ASSAY OF PHOSPHORUS: CPT

## 2018-11-27 PROCEDURE — 86923 COMPATIBILITY TEST ELECTRIC: CPT

## 2018-11-27 PROCEDURE — P9016 RBC LEUKOCYTES REDUCED: HCPCS

## 2018-11-27 PROCEDURE — 82962 GLUCOSE BLOOD TEST: CPT

## 2018-11-27 PROCEDURE — 74011636637 HC RX REV CODE- 636/637: Performed by: INTERNAL MEDICINE

## 2018-11-27 PROCEDURE — 74011636637 HC RX REV CODE- 636/637: Performed by: HOSPITALIST

## 2018-11-27 PROCEDURE — 77010033678 HC OXYGEN DAILY

## 2018-11-27 PROCEDURE — 77030039270 HC TU BLD FLTR CARD -A

## 2018-11-27 PROCEDURE — 36415 COLL VENOUS BLD VENIPUNCTURE: CPT

## 2018-11-27 PROCEDURE — 74011000258 HC RX REV CODE- 258: Performed by: INTERNAL MEDICINE

## 2018-11-27 RX ORDER — POTASSIUM CHLORIDE 20 MEQ/1
20 TABLET, EXTENDED RELEASE ORAL DAILY
Status: DISCONTINUED | OUTPATIENT
Start: 2018-11-27 | End: 2018-11-28 | Stop reason: HOSPADM

## 2018-11-27 RX ORDER — FUROSEMIDE 20 MG/1
20 TABLET ORAL DAILY
Status: DISCONTINUED | OUTPATIENT
Start: 2018-11-28 | End: 2018-11-28

## 2018-11-27 RX ORDER — SODIUM CHLORIDE 9 MG/ML
250 INJECTION, SOLUTION INTRAVENOUS AS NEEDED
Status: DISCONTINUED | OUTPATIENT
Start: 2018-11-27 | End: 2018-11-28 | Stop reason: HOSPADM

## 2018-11-27 RX ADMIN — CEFTRIAXONE SODIUM 1 G: 1 INJECTION, POWDER, FOR SOLUTION INTRAMUSCULAR; INTRAVENOUS at 23:46

## 2018-11-27 RX ADMIN — GABAPENTIN 300 MG: 300 CAPSULE ORAL at 16:20

## 2018-11-27 RX ADMIN — Medication 10 ML: at 14:21

## 2018-11-27 RX ADMIN — PANTOPRAZOLE SODIUM 40 MG: 40 TABLET, DELAYED RELEASE ORAL at 16:20

## 2018-11-27 RX ADMIN — CARVEDILOL 12.5 MG: 12.5 TABLET, FILM COATED ORAL at 08:09

## 2018-11-27 RX ADMIN — CARVEDILOL 12.5 MG: 12.5 TABLET, FILM COATED ORAL at 16:20

## 2018-11-27 RX ADMIN — POTASSIUM CHLORIDE 20 MEQ: 20 TABLET, EXTENDED RELEASE ORAL at 14:20

## 2018-11-27 RX ADMIN — GABAPENTIN 300 MG: 300 CAPSULE ORAL at 21:41

## 2018-11-27 RX ADMIN — Medication 10 ML: at 07:07

## 2018-11-27 RX ADMIN — PANTOPRAZOLE SODIUM 40 MG: 40 TABLET, DELAYED RELEASE ORAL at 06:14

## 2018-11-27 RX ADMIN — ISOSORBIDE MONONITRATE 30 MG: 30 TABLET, EXTENDED RELEASE ORAL at 08:09

## 2018-11-27 RX ADMIN — INSULIN GLARGINE 10 UNITS: 100 INJECTION, SOLUTION SUBCUTANEOUS at 21:41

## 2018-11-27 RX ADMIN — STANDARDIZED SENNA CONCENTRATE AND DOCUSATE SODIUM 2 TABLET: 8.6; 5 TABLET, FILM COATED ORAL at 10:00

## 2018-11-27 RX ADMIN — Medication 10 ML: at 08:11

## 2018-11-27 RX ADMIN — INSULIN LISPRO 2 UNITS: 100 INJECTION, SOLUTION INTRAVENOUS; SUBCUTANEOUS at 21:42

## 2018-11-27 RX ADMIN — Medication 10 ML: at 21:41

## 2018-11-27 RX ADMIN — GABAPENTIN 300 MG: 300 CAPSULE ORAL at 08:09

## 2018-11-27 RX ADMIN — INSULIN LISPRO 2 UNITS: 100 INJECTION, SOLUTION INTRAVENOUS; SUBCUTANEOUS at 16:26

## 2018-11-27 NOTE — PROGRESS NOTES
Patient receiving 1/2 units of PRBCs at this time. Patient is resting in bed with no complaints. Will continue to monitor.

## 2018-11-27 NOTE — PROGRESS NOTES
Hourly rounds complete this shift, no new complaints at this time, Critical lab Hgb 6.7, hospitalist paged, waiting for call back bed in low, locked position, call light and bedside table within reach,  all needs met. Will continue to monitor Report to day shift nurse.

## 2018-11-27 NOTE — PROGRESS NOTES
In accordance with Medicare Guidelines, a copy of the Important Letter from Medicare was presented to the patient in anticipation of expected discharge within 48 hours. An oral explanation was provided and all questions were answered. An initialed copy of the Important Letter from Medicare was placed in the patient's medical chart, and an initialed copy of the Important Letter from Medicare was provided to the patient. Care Managers were notified.

## 2018-11-27 NOTE — PROGRESS NOTES
Hospitalist Progress Note Admit Date:  2018  8:20 PM  
Name:  Yanet Stein Age:  80 y.o. 
:  1935 MRN:  [id number] PCP:  Linda Walsh MD 
Treatment Team: Attending Provider: Skyla Child MD; Consulting Provider: Alexia Larry MD; Care Manager: Hailey Benitez [facility] Subjective:  
Notes from prior hpi: 
  
Ms. Lorenzo Roque is an 81 yo AAF, with a pmh of chronic chf and atrial fibrillation, who presented  from home for increased fatigue/weakness and hematuria. Clots in urine in ED and du placed for CBI. Urine culture growing >100K GNRs and she is on iv rocephin. Hematuria has mostly resolved. Her sodium was low on admission and diuretics held and NS IVFs administered. Her post prandial blood sugars are elevated and her A1C is 8.1%. She takes amaryl at home. She is feeling improved. Denies chest pain, sob.  at bedside. Today: 
Hb 6.7 and serum k yesterday 3.0. Hospice care but family and pt. Came off hospice to be admitted. She is very weak. Hx of significant recurrent admissions for chf. Plan is d.c. Back home to hospice care. I placed call for daughter Angelica Lozano [phone] re: discuss long term plan of care as future hospitalizations likely. Plan to correct k and transfuse to hb > 7 prior to discharge. Objective:  
 
Patient Vitals for the past 24 hrs: 
 Temp Pulse Resp BP SpO2  
18 1147 97.9 °F (36.6 °C) 64 18 128/73 100 % 18 1140     99 % 18 0809  72  130/75   
18 0806 98.2 °F (36.8 °C) 70 16 130/75 99 % 18 0510 98 °F (36.7 °C) 67 18 118/72 99 % 18 2323 98.3 °F (36.8 °C) 64 18 121/76 100 % 18 2136  64 18 116/66 99 % 18 1912 98 °F (36.7 °C) 64 18 122/72 100 % 18 1415     99 % Oxygen Therapy O2 Sat (%): 100 % (18 1147) Pulse via Oximetry: 77 beats per minute (18 1006) O2 Device: Nasal cannula (18 1140) O2 Flow Rate (L/min): 1 l/min(wanted to leave 02 on) (11/27/18 1140) Intake/Output Summary (Last 24 hours) at 11/27/2018 1217 Last data filed at 11/27/2018 2509 Gross per 24 hour Intake 120 ml Output 1300 ml Net -1180 ml Physical Examination: 
General:    lethargic  , alert to person only Head:  Normocephalic, atraumatic Eyes:  Extraocular movements intact, normal sclera CV:   Irregular rate ok at time of exam 
Lungs:   Unlabored, no cyanosis Abdomen:   Soft, nondistended, nontender. Extremities: Warm and dry. No cyanosis or edema. Skin:     No rashes or jaundice. Neuro:  No gross focal deficits Data Review: 
I have reviewed all labs, meds, telemetry events, and studies from the last 24 hours. Recent Results (from the past 24 hour(s)) GLUCOSE, POC Collection Time: 11/26/18  3:47 PM  
Result Value Ref Range Glucose (POC) 280 (H) 65 - 100 mg/dL GLUCOSE, POC Collection Time: 11/26/18  8:36 PM  
Result Value Ref Range Glucose (POC) 200 (H) 65 - 100 mg/dL CBC WITH AUTOMATED DIFF Collection Time: 11/27/18  5:46 AM  
Result Value Ref Range WBC 3.5 (L) 4.3 - 11.1 K/uL  
 RBC 2.51 (L) 4.05 - 5.2 M/uL HGB 6.7 (LL) 11.7 - 15.4 g/dL HCT 21.6 (L) 35.8 - 46.3 % MCV 86.1 79.6 - 97.8 FL  
 MCH 26.7 26.1 - 32.9 PG  
 MCHC 31.0 (L) 31.4 - 35.0 g/dL  
 RDW 14.2 % PLATELET 263 (L) 135 - 450 K/uL MPV 12.5 (H) 9.4 - 12.3 FL ABSOLUTE NRBC 0.00 0.0 - 0.2 K/uL  
 DF AUTOMATED NEUTROPHILS 68 43 - 78 % LYMPHOCYTES 17 13 - 44 % MONOCYTES 12 4.0 - 12.0 % EOSINOPHILS 3 0.5 - 7.8 % BASOPHILS 0 0.0 - 2.0 % IMMATURE GRANULOCYTES 1 0.0 - 5.0 %  
 ABS. NEUTROPHILS 2.4 1.7 - 8.2 K/UL  
 ABS. LYMPHOCYTES 0.6 0.5 - 4.6 K/UL  
 ABS. MONOCYTES 0.4 0.1 - 1.3 K/UL  
 ABS. EOSINOPHILS 0.1 0.0 - 0.8 K/UL  
 ABS. BASOPHILS 0.0 0.0 - 0.2 K/UL  
 ABS. IMM. GRANS. 0.0 0.0 - 0.5 K/UL METABOLIC PANEL, BASIC  Collection Time: 11/27/18  5:46 AM  
 Result Value Ref Range Sodium 132 (L) 136 - 145 mmol/L Potassium 3.0 (L) 3.5 - 5.1 mmol/L Chloride 90 (L) 98 - 107 mmol/L  
 CO2 34 (H) 21 - 32 mmol/L Anion gap 8 7 - 16 mmol/L Glucose 96 65 - 100 mg/dL  (H) 8 - 23 MG/DL Creatinine 1.47 (H) 0.6 - 1.0 MG/DL  
 GFR est AA 44 (L) >60 ml/min/1.73m2 GFR est non-AA 36 (L) >60 ml/min/1.73m2 Calcium 10.1 8.3 - 10.4 MG/DL MAGNESIUM Collection Time: 11/27/18  5:46 AM  
Result Value Ref Range Magnesium 2.0 1.8 - 2.4 mg/dL PHOSPHORUS Collection Time: 11/27/18  5:46 AM  
Result Value Ref Range Phosphorus 2.7 2.3 - 3.7 MG/DL  
GLUCOSE, POC Collection Time: 11/27/18  8:01 AM  
Result Value Ref Range Glucose (POC) 112 (H) 65 - 100 mg/dL GLUCOSE, POC Collection Time: 11/27/18 11:00 AM  
Result Value Ref Range Glucose (POC) 148 (H) 65 - 100 mg/dL All Micro Results Procedure Component Value Units Date/Time CULTURE, URINE [[id number]]  (Abnormal)  (Susceptibility) Collected:  11/23/18 2228 Order Status:  Completed Specimen:  Urine Updated:  11/27/18 3685 Special Requests: NO SPECIAL REQUESTS Culture result:    
  >100,000 COLONIES/mL ESCHERICHIA COLI Current Meds: 
Current Facility-Administered Medications Medication Dose Route Frequency  0.9% sodium chloride infusion 250 mL  250 mL IntraVENous PRN  
 insulin glargine (LANTUS) injection 10 Units  10 Units SubCUTAneous QHS  pantoprazole (PROTONIX) tablet 40 mg  40 mg Oral ACB&D  
 HYDROcodone-acetaminophen (NORCO) 5-325 mg per tablet 1 Tab  1 Tab Oral TID PRN  
 LORazepam (ATIVAN) tablet 0.5 mg  0.5 mg Oral BID PRN  
 insulin lispro (HUMALOG) injection 0-10 Units  0-10 Units SubCUTAneous AC&HS  
 dextrose 40% (GLUTOSE) oral gel 1 Tube  15 g Oral PRN  
 glucagon (GLUCAGEN) injection 1 mg  1 mg IntraMUSCular PRN  
 dextrose (D50W) injection syrg 12.5-25 g  25-50 mL IntraVENous PRN  
  carvedilol (COREG) tablet 12.5 mg  12.5 mg Oral BID WITH MEALS  gabapentin (NEURONTIN) capsule 300 mg  300 mg Oral TID  isosorbide mononitrate ER (IMDUR) tablet 30 mg  30 mg Oral DAILY  sodium chloride (NS) flush 5-10 mL  5-10 mL IntraVENous Q8H  
 sodium chloride (NS) flush 5-10 mL  5-10 mL IntraVENous PRN  
 acetaminophen (TYLENOL) tablet 650 mg  650 mg Oral Q4H PRN  
 naloxone (NARCAN) injection 0.4 mg  0.4 mg IntraVENous PRN  
 ondansetron (ZOFRAN) injection 4 mg  4 mg IntraVENous Q4H PRN  
 senna-docusate (PERICOLACE) 8.6-50 mg per tablet 2 Tab  2 Tab Oral DAILY PRN  
 zolpidem (AMBIEN) tablet 5 mg  5 mg Oral QHS PRN  
 hydrALAZINE (APRESOLINE) 20 mg/mL injection 20 mg  20 mg IntraVENous Q4H PRN  
 alum-mag hydroxide-simeth (MYLANTA) oral suspension 30 mL  30 mL Oral Q4H PRN  
 cefTRIAXone (ROCEPHIN) 1 g in 0.9% sodium chloride (MBP/ADV) 50 mL  1 g IntraVENous Q24H Diet: DIET DIABETIC WITH OPTIONS Other Studies (last 24 hours): No results found. Assessment and Plan:  
 
Hospital Problems as of 11/27/2018 Date Reviewed: 10/22/2018 Codes Class Noted - Resolved POA Uncontrolled type 2 diabetes mellitus with hyperglycemia (HCC) ICD-10-CM: E11.65 ICD-9-CM: 250.02  11/26/2018 - Present Yes Acute blood loss anemia ICD-10-CM: D62 
ICD-9-CM: 285.1  11/23/2018 - Present Yes Hematuria ICD-10-CM: R31.9 ICD-9-CM: 599.70  11/23/2018 - Present Yes Acute cystitis with hematuria ICD-10-CM: N30.01 
ICD-9-CM: 595.0  11/23/2018 - Present Yes * (Principal) Hyponatremia ICD-10-CM: E87.1 ICD-9-CM: 276.1  9/7/2018 - Present Yes Bedbound (Chronic) ICD-10-CM: Z74.01 
ICD-9-CM: V49.84  8/21/2018 - Present Yes Lymphedema of both lower extremities (Chronic) ICD-10-CM: I89.0 ICD-9-CM: 457.1  4/11/2018 - Present Yes  Overview Signed 4/11/2018  4:30 PM by Belgica Andrea MD  
  Pt on maximum diuretic therapy; still with symptomatic bilateral LE edema; refer to PT to evaluated for lymphedema therapy. Stage 4 chronic kidney disease (HCC) (Chronic) ICD-10-CM: N18.4 ICD-9-CM: 585.4  12/3/2012 - Present Yes Overview Addendum 4/11/2018  4:32 PM by Conchita Kimball MD  
  Follow closely [facility]. Last GFR now at 22 (from 32). DNR (do not resuscitate) (Chronic) ICD-10-CM: E48 ICD-9-CM: V49.86  11/23/2012 - Present Yes Chronic combined systolic and diastolic heart failure (HCC) (Chronic) ICD-10-CM: I50.42 
ICD-9-CM: 428.42  11/14/2012 - Present Yes Overview Addendum 5/15/2018  9:52 AM by Kalyan Sanderson MD  
  07/2010 - 45% 04/2011:  EF 55% 10/2012:  EF 50% 08/2013:  EF 50% 05/2014:  EF 50% 10/2015:  EF 50% 11/2017:  EF 55% Anemia (Chronic) ICD-10-CM: D64.9 ICD-9-CM: 285.9  11/14/2012 - Present Yes Overview Addendum 1/30/2018 12:46 PM by Conchita Kimball MD  
  Last Hgb improved to normal. Check today. Pulmonary HTN- RVSP 51 (Chronic) ICD-10-CM: I27.20 ICD-9-CM: 416.8  11/13/2012 - Present Yes Overview Addendum 5/15/2018  9:51 AM by Kalyan Sanderson MD  
  On sildenafil 
04/2010:  PASP 60mmHg 04/2011:  PASP 80mmHg 10/2012:  PASP 60mmHg - severely dilated RV and severely reduced RV function 08/2013:  PASP 60mmHg 
05/2014:  PASP 60mmHg - Dilated RV and mild HK 
10/2015:  PASP 62mmHg - Dilated RV and mild HK  
11/2017:  PASP 36mmHg - Dilated RV and mild HK Sleep apnea - noncompliant with CPAP (Chronic) ICD-10-CM: G47.30 ICD-9-CM: 780.57  11/13/2012 - Present Yes Overview Addendum 4/11/2018  4:32 PM by Conchita Kimball MD  
  Pt needs supplies. Morbid obesity ([facility] (Chronic) ICD-10-CM: E66.01 
ICD-9-CM: 278.01  6/20/2012 - Present Yes Cardiomyopathy ([facility] (Chronic) ICD-10-CM: I42.9 ICD-9-CM: 425.4  11/25/2009 - Present Yes Overview Addendum 1/31/2017  5:17 PM by Conchita Kimball MD  
  Following with Cardiology. Persistent atrial fibrillation (HCC) (Chronic) ICD-10-CM: I48.1 ICD-9-CM: 427.31  10/26/2009 - Present Yes Essential hypertension (Chronic) ICD-10-CM: I10 
ICD-9-CM: 401.9  10/26/2009 - Present Yes Overview Signed 4/18/2017  5:10 PM by Elkin Brito MD  
  At goal. Continue current tx A/P:   
Principal Problem: Hyponatremia (9/7/2018) Diuretics on hold restart after prbc  
  
Active Problems: 
  Persistent atrial fibrillation ([facility] (10/26/2009) 
  
  Essential hypertension (10/26/2009) Overview: At goal. Continue current tx 
  
  Morbid obesity ([facility] [address] (6/20/2012) 
  
  Pulmonary HTN- RVSP 51 (11/13/2012) Overview: On sildenafil 
    04/2010:  PASP 60mmHg 04/2011:  PASP 80mmHg 10/2012:  PASP 60mmHg - severely dilated RV  
    08/2013:  PASP 60mmHg 
    05/2014:  PASP 60mmHg - Dilated RV and mild HK 
    10/2015:  PASP 62mmHg - Dilated RV and mild HK  
    11/2017:  PASP 36mmHg - Dilated RV and mild HK 
  
  Sleep apnea - noncompliant with CPAP (11/13/2012) Chronic combined systolic and diastolic heart failure ([facility] (11/14/2012) Overview: 07/2010 - 45% 04/2011:  EF 55% 10/2012:  EF 50% 08/2013:  EF 50% 05/2014:  EF 50% 10/2015:  EF 50% 11/2017:  EF 55% 
  
  Anemia (11/14/2012) transfuse hb > 7 
  
  DNR (do not resuscitate) (11/23/2012) 
  
  Stage 4 chronic kidney disease ([facility] (12/3/2012) Overview: Follow closely [facility]. Last GFR now at 22 (from 32).   
  Acute blood loss anemia (11/23/2018) Due to hematuria, mostly resolved. xarelto restart? ? Discuss with family-call placed 
  
  Cardiomyopathy ([facility] (11/25/2009) Overview: Following with Cardiology. 
  
  Lymphedema of both lower extremities (4/11/2018)     Overview: Pt on maximum diuretic therapy; still with symptomatic bilateral LE edema;  
    refer to PT to evaluated for lymphedema therapy. 
  
  Bedbound (8/21/2018) 
  
 Hematuria (11/23/2018) 
  Acute cystitis with hematuria (11/23/2018) On iv rocephin. ecoli sens to bactrim res quinolones--po bactrim POSSIBLE DC HOME TMW WITH HOSPICE CARE TO RESUME-clarify goals of care with daughter ADDENDUM-- FINISHES 5 DAY COURSE ROCEPHIN TODAY --RECHECK UA AND AVOID BACTRIM IF POSSIBLE RE: CKD ADVANCED 
  
  Uncontrolled type 2 diabetes mellitus with hyperglycemia ([facility] (11/26/2018) A1C is 8.1% on oral agents. Will continue lantus 10 units daily with SSI. Consult diabetic educator. Patient states this is NOT a new dx.  
  
Possibly home tomorrow with home hospice on oral abx? this is unchanged from 11/26 --dc delay secondary to anemia

## 2018-11-27 NOTE — ADT AUTH CERT NOTES
Patient Demographics Patient Name Harini Lai 
[id number] Sex Female  
1935 Address [address] [address] Phone [phone] (Home) *Preferred* 
[phone] [facility]) CSN:  
[id number] Admit Date: Admit Time Room Bed 2018  8:20  [[id number]] 01 Alfredo Hidalgo Attending Providers Provider Pager From To Shantell Duncan MD  18 Chapis Beavers MD  18 Emergency Contact(s) Name Relation Home Work Mobile Anai Varner Daughter [phone] Utilization Reviews Dehydration - Care Day 5 (2018) by Omkar Leon RN Review Entered Review Status 2018 16:00 Completed Criteria Review Care Day: 5 Care Date: 2018 Level of Care: Telemetry Guideline Day 2 Clinical Status  
(X) * Hemodynamic stability  
(X) * Vomiting absent or controlled ( ) * Electrolyte levels normal or acceptable for outpatient follow-up  
(X) * Cause of dehydration requiring inpatient treatment absent  
(X) * Renal function at baseline or improved ( ) * Discharge plans and education understood Activity  
(X) * Ambulatory Routes  
(X) * Oral hydration  
(X) * Liquid or advanced diet  
(X) Oral or parenteral medications Interventions  
(X) Electrolytes * Milestone Additional Notes Today:  
Hb 6.7 and serum k yesterday 3.0. Hospice care but family and pt. Came off hospice to be admitted. Renata Saucedo is very weak. Hx of significant recurrent admissions for chf. Plan is d.c. Back home to hospice care. I placed call for daughter Ankita Zuniga [phone] re: discuss long term plan of care as future hospitalizations likely.  Plan to correct k and transfuse to hb > 7 prior to discharge.  
   
   
Physical Examination:  
General:          lethargic  , alert to person only Head:               Normocephalic, atraumatic Eyes:               Extraocular movements intact, normal sclera CV:                  Irregular rate ok at time of exam  
Lungs:             Unlabored, no cyanosis Abdomen:        Soft, nondistended, nontender. Extremities:     Warm and dry.  No cyanosis or edema. Skin:                No rashes or jaundice. Neuro:             No gross focal deficits A/P:    
Principal Problem:  
  Hyponatremia (9/7/2018) Diuretics on hold restart after prbc   
   
Active Problems:  
  Persistent atrial fibrillation ([facility] (10/26/2009)    
  Essential hypertension (10/26/2009)  
    Overview: At goal. Continue current tx  
   
  
Anemia (11/14/2012)  
  transfuse hb > 7    
  DNR (do not resuscitate) (11/23/2012)    
  Stage 4 chronic kidney disease ([facility] (12/3/2012)  
    Overview: Follow closely [facility].  Last GFR now at 22 (from 31).    
   
  Acute blood loss anemia (11/23/2018) Due to hematuria, mostly resolved. xarelto restart? ? Discuss with family-call placed  
   
  [facility] (11/25/2009)  
    Overview: Following with Cardiology.  
   
  Lymphedema of both lower extremities (4/11/2018)  
    Overview: Pt on maximum diuretic therapy; still with symptomatic bilateral LE edema;   
    refer to PT to evaluated for lymphedema therapy.  
   
  Bedbound (8/21/2018)    
  Hematuria (11/23/2018)    
  Acute cystitis with hematuria (11/23/2018) On iv rocephin. ecoli sens to bactrim res quinolones--po bactrim POSSIBLE DC HOME TMW WITH HOSPICE CARE TO RESUME-clarify goals of care with daughter ADDENDUM-- FINISHES 5 DAY COURSE ROCEPHIN TODAY --RECHECK UA AND AVOID BACTRIM IF POSSIBLE RE: CKD ADVANCED  
   
  Uncontrolled type 2 diabetes mellitus with hyperglycemia ([facility] (11/26/2018) A1C is 8.1% on oral agents.  Will continue lantus 10 units daily with SSI.  Consult diabetic educator. Doreen Lerma states this is NOT a new dx.   
   
 Possibly home tomorrow with home hospice on oral abx? this is unchanged from 11/26 --dc delay secondary to anemia  
   
  
/55, TEMP 98, HR 73, RR 18, O2 100 ON 1L NC Dehydration - Care Day 3 (11/25/2018) by Katrin Beach RN Review Entered Review Status 11/26/2018 15:37 Completed Criteria Review Care Day: 3 Care Date: 11/25/2018 Level of Care: Telemetry Guideline Day 2 Clinical Status  
(X) * Hemodynamic stability  
(X) * Vomiting absent or controlled  
(X) * Electrolyte levels normal or acceptable for outpatient follow-up ( ) * Cause of dehydration requiring inpatient treatment absent  
(X) * Renal function at baseline or improved ( ) * Discharge plans and education understood Activity  
(X) * Ambulatory Routes  
(X) * Oral hydration  
(X) * Liquid or advanced diet  
(X) Oral or parenteral medications Interventions  
(X) Electrolytes * Milestone Additional Notes URO NOTE Plan:  Gross hematuria now resolved. Urine culture shows >100k GNR. Continue abx.   
   
  
IM NOTE General:       Pale, mild acute distress    
Lungs:          CTA bilaterally. Resp even and nonlabored Heart:            S1S2 present without murmurs rubs gallops. RRR.  Chronic lymphedema Abdomen:    Soft, non distended. Suprapubic tenderness on palpation. Extremities: No cyanosis. Moves ext spontaneously.    
Neurologic:  Awake alert, oriented X3.  No focal deficits. Plan:  
   
UTI:  Follow urine cx/ grow G-ve rods.  Continue rocephin  
   
Hematuria:  Urology consulted. Continue CBI.  Holding xarelto. Hb down to 7 today. Pt agrres to wait and keep monitoring and hold on transfusion since she is asymptomatic. Avoid anticoag. PPI bid po. Follow work up for anemia  
   
PAF:  Holding xarelto  
   
Hyponatremia:  Improving. Continue IVF.  BMP in AM  
   
HTN:  Stable, continue current regimen  
   
 Chronic combined systolic diastolic HF: Echo Sep 2028 with EF 55%.  Continue current regimen.   
   
  
  
  
  
  
Dehydration - Care Day 2 (11/24/2018) by Sheldon Simon RN Review Entered Review Status 11/26/2018 15:31 Completed Criteria Review Care Day: 2 Care Date: 11/24/2018 Level of Care: Telemetry Guideline Day 2 Clinical Status  
(X) * Hemodynamic stability  
(X) * Vomiting absent or controlled  
(X) * Electrolyte levels normal or acceptable for outpatient follow-up ( ) * Cause of dehydration requiring inpatient treatment absent  
(X) * Renal function at baseline or improved ( ) * Discharge plans and education understood Activity  
(X) * Ambulatory Routes  
(X) * Oral hydration  
(X) * Liquid or advanced diet  
(X) Oral or parenteral medications Interventions  
(X) Electrolytes 11/26/2018 3:31 PM EST by James Giron  
  , K 3.2, CHL 82, , CREAT 2.14  
  
  
  
  
  
* Milestone Additional Notes URO CONSULT  
 ASSESSMENT:    
1.  Gross hematuria with possible urinary tract infection. 2.  End-stage congestive heart failure.    
3.  The patient had recently been on Xarelto.  
   
RECOMMENDATION:  I would go ahead and continue to treat the UTI.  This may help her hematuria and we will follow the culture result.  I agree that she is a poor candidate for anticoagulation.  It may be best to stop her Xarelto, especially if she is going to go back to hospice status.  Continue the 3-way Harper. Kirti Kaufman may need a cystoscopy in the future, but if she has a simple UTI that improves with treatment, we will not have to do that.  
   
  
IM NOTE Still with some lower abd pain.    
   
   
  
General:       No acute distress    
Lungs:          CTA bilaterally. Resp even and nonlabored Heart:            S1S2 present without murmurs rubs gallops. RRR.  Chronic lymphedema Abdomen:    Soft, non distended. Suprapubic tenderness on palpation. Extremities: No cyanosis. Moves ext spontaneously.    
Neurologic:  Drowsy, oriented X3.  No focal deficits. Plan:  
   
UTI:  Follow urine cx.  Continue rocephin   
   
Hematuria:  Urology consulted. Continue CBI.  Holding xarelto.  
   
PAF:  Holding xarelto  
   
Hyponatremia:  Improving. Continue IVF. BMP in AM  
   
HTN:  Stable, continue current regimen  
   
Chronic combined systolic diastolic HF: Echo Sep 0770 with EF 55%.  Continue current regimen.   
   
   
   
  
/74, TEMP 97.3, HR 61, RR 18, O2 100 ON 2L NC  
  
HGB 8.6, HCT 27.4, PLAT 116, , K 3.2, CHL 82, , CREAT 2.14, TROP 0.03  
  
**URINE CULT: GRAM NEG RODS** DIAB MGMT CONSULT, DIAB DIET, CARD MONITORING  
  
ROCEPHIN 1G QD IV, NS DRIP @ 50

## 2018-11-27 NOTE — PROGRESS NOTES
Interdisciplinary Rounds completed 11/27/18. Nursing, Case Management, Physician and PT present. Plan of care reviewed and updated. Pt is receiving 2 units of blood and  Potassium replacement. Hopefully will be ready to go home tomorrow with daughter.

## 2018-11-27 NOTE — DIABETES MGMT
Patient's blood glucose ranged 150-280 yesterday with patient receiving Lantus 10 units and Humalog 18 units. Blood glucose  today. Hgb 6.7. K+ 3. 0. Patient currently receiving PRBC's and reports feeling worse than yesterday. Insulin instruction completed with patient's daughter, [facility]. Patient's daughter instructed re: preparation and injection of insulin dose. Daughter returned demonstrated proper insulin injection technique using injection model. Patient's daughter also verbalizes understanding of site rotation, storage of insulin, and proper sharps disposal. Pt was also instructed in proper use of insulin pens. Pt's daughter was able to return demonstrate proper use of insulin pen using injection model. Daughter prefers insulin pens. Given patient did poorly with vial and syringe daughter would prefer insulin pens at discharge. Patient will need prescription for insulin pen and insulin pen needles at discharge. Educated pt's daughter re: current basal/bolus regimen of Lantus and Humalog including type of insulin, timing with meals, onset, duration of effect, and peak of insulin dose. Pt's daughter verbalizes understanding. Encouraged daughter to set goals with patient's PCP regarding blood glucose control. Educated patient that a higher A1c can be acceptable for elderly complex patients to avoid hypoglycemia risk, but that you want good enough control that her diabetes is not complicating her health status. Daughter verbalized understanding. Daughter voices no further questions at this time.

## 2018-11-28 VITALS
RESPIRATION RATE: 18 BRPM | HEART RATE: 66 BPM | BODY MASS INDEX: 35.36 KG/M2 | DIASTOLIC BLOOD PRESSURE: 70 MMHG | HEIGHT: 66 IN | TEMPERATURE: 98.5 F | SYSTOLIC BLOOD PRESSURE: 125 MMHG | WEIGHT: 220 LBS | OXYGEN SATURATION: 97 %

## 2018-11-28 LAB
ABO + RH BLD: NORMAL
ANION GAP SERPL CALC-SCNC: 8 MMOL/L (ref 7–16)
APPEARANCE UR: CLEAR
BACTERIA URNS QL MICRO: 0 /HPF
BASOPHILS # BLD: 0 K/UL (ref 0–0.2)
BASOPHILS NFR BLD: 0 % (ref 0–2)
BILIRUB UR QL: NEGATIVE
BLD PROD TYP BPU: NORMAL
BLD PROD TYP BPU: NORMAL
BLOOD GROUP ANTIBODIES SERPL: NORMAL
BPU ID: NORMAL
BPU ID: NORMAL
BUN SERPL-MCNC: 129 MG/DL (ref 8–23)
CALCIUM SERPL-MCNC: 10.2 MG/DL (ref 8.3–10.4)
CASTS URNS QL MICRO: 0 /LPF
CHLORIDE SERPL-SCNC: 94 MMOL/L (ref 98–107)
CO2 SERPL-SCNC: 34 MMOL/L (ref 21–32)
COLOR UR: YELLOW
CREAT SERPL-MCNC: 1.4 MG/DL (ref 0.6–1)
CROSSMATCH RESULT,%XM: NORMAL
CROSSMATCH RESULT,%XM: NORMAL
DIFFERENTIAL METHOD BLD: ABNORMAL
EOSINOPHIL # BLD: 0.1 K/UL (ref 0–0.8)
EOSINOPHIL NFR BLD: 2 % (ref 0.5–7.8)
EPI CELLS #/AREA URNS HPF: ABNORMAL /HPF
ERYTHROCYTE [DISTWIDTH] IN BLOOD BY AUTOMATED COUNT: 14.6 %
GLUCOSE BLD STRIP.AUTO-MCNC: 125 MG/DL (ref 65–100)
GLUCOSE BLD STRIP.AUTO-MCNC: 181 MG/DL (ref 65–100)
GLUCOSE SERPL-MCNC: 134 MG/DL (ref 65–100)
GLUCOSE UR STRIP.AUTO-MCNC: NEGATIVE MG/DL
HCT VFR BLD AUTO: 30 % (ref 35.8–46.3)
HGB BLD-MCNC: 9.6 G/DL (ref 11.7–15.4)
HGB UR QL STRIP: ABNORMAL
IMM GRANULOCYTES # BLD: 0 K/UL (ref 0–0.5)
IMM GRANULOCYTES NFR BLD AUTO: 0 % (ref 0–5)
KETONES UR QL STRIP.AUTO: NEGATIVE MG/DL
LEUKOCYTE ESTERASE UR QL STRIP.AUTO: ABNORMAL
LYMPHOCYTES # BLD: 0.5 K/UL (ref 0.5–4.6)
LYMPHOCYTES NFR BLD: 15 % (ref 13–44)
MCH RBC QN AUTO: 27.4 PG (ref 26.1–32.9)
MCHC RBC AUTO-ENTMCNC: 32 G/DL (ref 31.4–35)
MCV RBC AUTO: 85.7 FL (ref 79.6–97.8)
MONOCYTES # BLD: 0.4 K/UL (ref 0.1–1.3)
MONOCYTES NFR BLD: 10 % (ref 4–12)
NEUTS SEG # BLD: 2.5 K/UL (ref 1.7–8.2)
NEUTS SEG NFR BLD: 73 % (ref 43–78)
NITRITE UR QL STRIP.AUTO: NEGATIVE
NRBC # BLD: 0 K/UL (ref 0–0.2)
PH UR STRIP: 6.5 [PH] (ref 5–9)
PLATELET # BLD AUTO: 132 K/UL (ref 150–450)
PMV BLD AUTO: 11.9 FL (ref 9.4–12.3)
POTASSIUM SERPL-SCNC: 3.3 MMOL/L (ref 3.5–5.1)
PROT UR STRIP-MCNC: NEGATIVE MG/DL
RBC # BLD AUTO: 3.5 M/UL (ref 4.05–5.2)
RBC #/AREA URNS HPF: ABNORMAL /HPF
SODIUM SERPL-SCNC: 136 MMOL/L (ref 136–145)
SP GR UR REFRACTOMETRY: 1.01 (ref 1–1.02)
SPECIMEN EXP DATE BLD: NORMAL
STATUS OF UNIT,%ST: NORMAL
STATUS OF UNIT,%ST: NORMAL
UNIT DIVISION, %UDIV: 0
UNIT DIVISION, %UDIV: 0
UROBILINOGEN UR QL STRIP.AUTO: 0.2 EU/DL (ref 0.2–1)
WBC # BLD AUTO: 3.4 K/UL (ref 4.3–11.1)
WBC URNS QL MICRO: ABNORMAL /HPF

## 2018-11-28 PROCEDURE — 82962 GLUCOSE BLOOD TEST: CPT

## 2018-11-28 PROCEDURE — 74011250637 HC RX REV CODE- 250/637: Performed by: INTERNAL MEDICINE

## 2018-11-28 PROCEDURE — 85025 COMPLETE CBC W/AUTO DIFF WBC: CPT

## 2018-11-28 PROCEDURE — 81001 URINALYSIS AUTO W/SCOPE: CPT

## 2018-11-28 PROCEDURE — 36415 COLL VENOUS BLD VENIPUNCTURE: CPT

## 2018-11-28 PROCEDURE — 74011250637 HC RX REV CODE- 250/637: Performed by: HOSPITALIST

## 2018-11-28 PROCEDURE — 80048 BASIC METABOLIC PNL TOTAL CA: CPT

## 2018-11-28 RX ORDER — GLIMEPIRIDE 2 MG/1
2 TABLET ORAL
Status: DISCONTINUED | OUTPATIENT
Start: 2018-11-28 | End: 2018-11-28 | Stop reason: HOSPADM

## 2018-11-28 RX ORDER — POTASSIUM CHLORIDE 20 MEQ/1
40 TABLET, EXTENDED RELEASE ORAL
Status: COMPLETED | OUTPATIENT
Start: 2018-11-28 | End: 2018-11-28

## 2018-11-28 RX ORDER — POTASSIUM CHLORIDE 20 MEQ/1
20 TABLET, EXTENDED RELEASE ORAL DAILY
Qty: 30 TAB | Refills: 0 | Status: SHIPPED | OUTPATIENT
Start: 2018-11-28

## 2018-11-28 RX ORDER — GLIMEPIRIDE 2 MG/1
2 TABLET ORAL
Qty: 30 TAB | Refills: 0 | Status: ON HOLD | OUTPATIENT
Start: 2018-11-28 | End: 2019-01-30 | Stop reason: SINTOL

## 2018-11-28 RX ORDER — ASPIRIN 325 MG
325 TABLET, DELAYED RELEASE (ENTERIC COATED) ORAL DAILY
Qty: 30 TAB | Refills: 0 | Status: SHIPPED | OUTPATIENT
Start: 2018-11-28

## 2018-11-28 RX ORDER — ASPIRIN 325 MG
325 TABLET, DELAYED RELEASE (ENTERIC COATED) ORAL DAILY
Status: DISCONTINUED | OUTPATIENT
Start: 2018-11-28 | End: 2018-11-28 | Stop reason: HOSPADM

## 2018-11-28 RX ADMIN — POTASSIUM CHLORIDE 40 MEQ: 20 TABLET, EXTENDED RELEASE ORAL at 08:45

## 2018-11-28 RX ADMIN — GABAPENTIN 300 MG: 300 CAPSULE ORAL at 08:44

## 2018-11-28 RX ADMIN — GLIMEPIRIDE 2 MG: 2 TABLET ORAL at 08:44

## 2018-11-28 RX ADMIN — CARVEDILOL 12.5 MG: 12.5 TABLET, FILM COATED ORAL at 08:44

## 2018-11-28 RX ADMIN — ASPIRIN 325 MG: 325 TABLET, DELAYED RELEASE ORAL at 08:44

## 2018-11-28 RX ADMIN — ISOSORBIDE MONONITRATE 30 MG: 30 TABLET, EXTENDED RELEASE ORAL at 08:44

## 2018-11-28 RX ADMIN — Medication 10 ML: at 05:52

## 2018-11-28 RX ADMIN — PANTOPRAZOLE SODIUM 40 MG: 40 TABLET, DELAYED RELEASE ORAL at 05:53

## 2018-11-28 NOTE — PROGRESS NOTES
Problem: Pressure Injury - Risk of 
Goal: *Prevention of pressure injury Document Lorenzo Scale and appropriate interventions in the flowsheet. Outcome: Progressing Towards Goal 
Pressure Injury Interventions: 
Sensory Interventions: Assess changes in LOC, Check visual cues for pain, Discuss PT/OT consult with provider, Float heels, Keep linens dry and wrinkle-free, Minimize linen layers Moisture Interventions: Absorbent underpads, Limit adult briefs Activity Interventions: Increase time out of bed, Pressure redistribution bed/mattress(bed type), PT/OT evaluation Mobility Interventions: Float heels, HOB 30 degrees or less, Pressure redistribution bed/mattress (bed type), PT/OT evaluation Nutrition Interventions: Document food/fluid/supplement intake Friction and Shear Interventions: Apply protective barrier, creams and emollients, HOB 30 degrees or less, Lift sheet, Minimize layers

## 2018-11-28 NOTE — PROGRESS NOTES
Problem: Falls - Risk of 
Goal: *Absence of Falls Document Alexis Cervantesh Fall Risk and appropriate interventions in the flowsheet. Outcome: Progressing Towards Goal 
Fall Risk Interventions: 
  
 
Mentation Interventions: Adequate sleep, hydration, pain control, Bed/chair exit alarm, Evaluate medications/consider consulting pharmacy, Eyeglasses and hearing aids, Increase mobility, More frequent rounding, Reorient patient, Toileting rounds, Familiar objects from home Medication Interventions: Evaluate medications/consider consulting pharmacy, Patient to call before getting OOB, Teach patient to arise slowly Elimination Interventions: Call light in reach, Patient to call for help with toileting needs History of Falls Interventions: Consult care management for discharge planning, Door open when patient unattended, Evaluate medications/consider consulting pharmacy

## 2018-11-28 NOTE — DISCHARGE INSTRUCTIONS
DISCHARGE SUMMARY from Nurse    PATIENT INSTRUCTIONS:    After general anesthesia or intravenous sedation, for 24 hours or while taking prescription Narcotics:  · Limit your activities  · Do not drive and operate hazardous machinery  · Do not make important personal or business decisions  · Do  not drink alcoholic beverages  · If you have not urinated within 8 hours after discharge, please contact your surgeon on call. Report the following to your surgeon:  · Excessive pain, swelling, redness or odor of or around the surgical area  · Temperature over 100.5  · Nausea and vomiting lasting longer than 4 hours or if unable to take medications  · Any signs of decreased circulation or nerve impairment to extremity: change in color, persistent  numbness, tingling, coldness or increase pain  · Any questions    What to do at Home:  Recommended activity: Activity as tolerated,     If you experience any of the following symptoms fever, pain, nausea or vomiting, reoccurrence of bleeding or any other worrisome symptoms, please follow up with PCP/Hospice. *  Please give a list of your current medications to your Primary Care Provider. *  Please update this list whenever your medications are discontinued, doses are      changed, or new medications (including over-the-counter products) are added. *  Please carry medication information at all times in case of emergency situations. These are general instructions for a healthy lifestyle:    No smoking/ No tobacco products/ Avoid exposure to second hand smoke  Surgeon General's Warning:  Quitting smoking now greatly reduces serious risk to your health.     Obesity, smoking, and sedentary lifestyle greatly increases your risk for illness    A healthy diet, regular physical exercise & weight monitoring are important for maintaining a healthy lifestyle    You may be retaining fluid if you have a history of heart failure or if you experience any of the following symptoms: Weight gain of 3 pounds or more overnight or 5 pounds in a week, increased swelling in our hands or feet or shortness of breath while lying flat in bed. Please call your doctor as soon as you notice any of these symptoms; do not wait until your next office visit. Recognize signs and symptoms of STROKE:    F-face looks uneven    A-arms unable to move or move unevenly    S-speech slurred or non-existent    T-time-call 911 as soon as signs and symptoms begin-DO NOT go       Back to bed or wait to see if you get better-TIME IS BRAIN. Warning Signs of HEART ATTACK     Call 911 if you have these symptoms:   Chest discomfort. Most heart attacks involve discomfort in the center of the chest that lasts more than a few minutes, or that goes away and comes back. It can feel like uncomfortable pressure, squeezing, fullness, or pain.  Discomfort in other areas of the upper body. Symptoms can include pain or discomfort in one or both arms, the back, neck, jaw, or stomach.  Shortness of breath with or without chest discomfort.  Other signs may include breaking out in a cold sweat, nausea, or lightheadedness. Don't wait more than five minutes to call 911 - MINUTES MATTER! Fast action can save your life. Calling 911 is almost always the fastest way to get lifesaving treatment. Emergency Medical Services staff can begin treatment when they arrive -- up to an hour sooner than if someone gets to the hospital by car. The discharge information has been reviewed with the patient. The patient verbalized understanding. Discharge medications reviewed with the patient and appropriate educational materials and side effects teaching were provided. ___________________________________________________________________________________________________________________________________     Blood in the Urine: Care Instructions  Your Care Instructions    Blood in the urine, or hematuria, may make the urine look red, brown, or pink. There may be blood every time you urinate or just from time to time. You cannot always see blood in the urine, but it will show up in a urine test.  Blood in the urine may be serious. It should always be checked by a doctor. Your doctor may recommend more tests, including an X-ray, a CT scan, or a cystoscopy (which lets a doctor look inside the urethra and bladder). Blood in the urine can be a sign of another problem. Common causes are bladder infections and kidney stones. An injury to your groin or your genital area can also cause bleeding in the urinary tract. Very hard exercise--such as running a marathon--can cause blood in the urine. Blood in the urine can also be a sign of kidney disease or cancer in the bladder or kidney. Many cases of blood in the urine are caused by a harmless condition that runs in families. This is called benign familial hematuria. It does not need any treatment. Sometimes your urine may look red or brown even though it does not contain blood. For example, not getting enough fluids (dehydration), taking certain medicines, or having a liver problem can change the color of your urine. Eating foods such as beets, rhubarb, or blackberries or foods with red food coloring can make your urine look red or pink. Follow-up care is a key part of your treatment and safety. Be sure to make and go to all appointments, and call your doctor if you are having problems. It's also a good idea to know your test results and keep a list of the medicines you take. When should you call for help? Call your doctor now or seek immediate medical care if:    · You have symptoms of a urinary infection. For example:  ? You have pus in your urine. ? You have pain in your back just below your rib cage. This is called flank pain. ? You have a fever, chills, or body aches. ? It hurts to urinate. ?  You have groin or belly pain.     · You have more blood in your urine.    Watch closely for changes in your health, and be sure to contact your doctor if:    · You have new urination problems.     · You do not get better as expected. Where can you learn more? Go to http://manny-barbara.info/. Enter L275 in the search box to learn more about \"Blood in the Urine: Care Instructions. \"  Current as of: March 21, 2018  Content Version: 11.8  © 5924-5433 [employer]. Care instructions adapted under license by [employer] (which disclaims liability or warranty for this information). If you have questions about a medical condition or this instruction, always ask your healthcare professional. [place] [address] any warranty or liability for your use of this information.

## 2018-11-28 NOTE — PROGRESS NOTES
Pt's daughter Vic notified of discharge. Daughter requesting [facility] transport for 2:00 pm to give her time to notify hospice and be prepared for her coming home. Discharge instructions, appointments, and prescriptions reviewed with daughter over the phone as she is unsure if family will make it to the hospital prior to discharging. All questions answered. Discharge papers put in packet to be given to family at home. Primary RN notified and will remove IV closer to discharge time. CM will arrange transport.

## 2018-11-28 NOTE — PROGRESS NOTES
Hourly rounds performed this shift. Bed lowered and locked, side rails x2, and call light in reach. All patient needs are met at this time. Will continue to monitor and give bedside shift report to oncoming nurse. Patient currently has 2/2 units of PRBCs infusing at this time. Verified with Refugio Goode RN.

## 2018-11-28 NOTE — DISCHARGE SUMMARY
Hospitalist Discharge Summary     Admit Date:  2018  8:20 PM   Name:  Saurav Sena   Age:  80 y.o.  :  1935   MRN:  [id number]   PCP:  Cletus Mcardle, MD  Treatment Team: Attending Provider: Kate Rodriguez MD; Consulting Provider: Olivia Murguia MD; Care Manager: Alicia Segura [facility]    Problem List for this Hospitalization:  Hospital Problems as of 2018 Date Reviewed: 10/22/2018          Codes Class Noted - Resolved POA    Uncontrolled type 2 diabetes mellitus with hyperglycemia ([facility] [address] ICD-10-CM: E11.65  ICD-9-CM: 250.02  2018 - Present Yes        Acute blood loss anemia ICD-10-CM: D62  ICD-9-CM: 285.1  2018 - Present Yes        Hematuria ICD-10-CM: R31.9  ICD-9-CM: 599.70  2018 - Present Yes        Acute cystitis with hematuria ICD-10-CM: N30.01  ICD-9-CM: 595.0  2018 - Present Yes        * (Principal) Hyponatremia ICD-10-CM: E87.1  ICD-9-CM: 276.1  2018 - Present Yes        Bedbound (Chronic) ICD-10-CM: Z74.01  ICD-9-CM: V49.84  2018 - Present Yes        Lymphedema of both lower extremities (Chronic) ICD-10-CM: I89.0  ICD-9-CM: 457.1  2018 - Present Yes    Overview Signed 2018  4:30 PM by Cletus Mcardle, MD     Pt on maximum diuretic therapy; still with symptomatic bilateral LE edema; refer to PT to evaluated for lymphedema therapy. Stage 4 chronic kidney disease (HCC) (Chronic) ICD-10-CM: N18.4  ICD-9-CM: 585.4  12/3/2012 - Present Yes    Overview Addendum 2018  4:32 PM by Cletus Mcardle, MD     Follow closely [facility]. Last GFR now at 22 (from 32).                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 Check today. Pulmonary HTN- RVSP 51 (Chronic) ICD-10-CM: I27.20  ICD-9-CM: 416.8  11/13/2012 - Present Yes    Overview Addendum 5/15/2018  9:51 AM by Calvin Carnes MD     On sildenafil  04/2010:  PASP 60mmHg  04/2011:  PASP 80mmHg  10/2012:  PASP 60mmHg - severely dilated RV and severely reduced RV function  08/2013:  PASP 60mmHg  05/2014:  PASP 60mmHg - Dilated RV and mild HK  10/2015:  PASP 62mmHg - Dilated RV and mild HK   11/2017:  PASP 36mmHg - Dilated RV and mild HK             Sleep apnea - noncompliant with CPAP (Chronic) ICD-10-CM: G47.30  ICD-9-CM: 780.57  11/13/2012 - Present Yes    Overview Addendum 4/11/2018  4:32 PM by Frank Baer MD     Pt needs supplies. Morbid obesity ([address] (Chronic) ICD-10-CM: E66.01  ICD-9-CM: 278.01  6/20/2012 - Present Yes        Cardiomyopathy ([address] (Chronic) ICD-10-CM: I42.9  ICD-9-CM: 425.4  11/25/2009 - Present Yes    Overview Addendum 1/31/2017  5:17 PM by Frank Baer MD     Following with Cardiology. Persistent atrial fibrillation (HCC) (Chronic) ICD-10-CM: I48.1  ICD-9-CM: 427.31  10/26/2009 - Present Yes        Essential hypertension (Chronic) ICD-10-CM: I10  ICD-9-CM: 401.9  10/26/2009 - Present Yes    Overview Signed 4/18/2017  5:10 PM by Frank Baer MD     At goal. Continue current tx                     Admission HPI from 11/23/2018:    \" Pt is an 79 y/o F with extensive medical history who presented with complaint of bleeding in her briefs. Family says this was first noticed today. She is bedbound at baseline and was on home hospice for end stage CHF reportedly. She has been increasingly fatigued the past few weeks.   She had some cramping suprapubic/lower abd pain off and on the last 3-4 days. She was noted to have several blood clots in her urine in ER. Denies history of hematuria in past.  Denies dysuria or other urinary symptoms. No CP, SOB, cough, diarrhea, fevers, chills.        \"    Hospital Course:  Hematuria with clot on admit. Found to have ecoli uti sensitive to rocephin. Treated 5 day course and cllinical sx resolved. xarelto for afib cva prophylaxis held. Aspirin started as substitute as pt has fall risk and risk > benefit for full ac for cva prophylaxis. 2 unit prbc given last pm for hb 6.7 but stable. No gi bleeding clinically. Zaroxolyn held during stay and needed increased k supplementation. Appears stable for discharge back to home hospice care. I spoke with daughter 11/27 re: zaroxolyn change and recommend follow up asap with primary care to clarify restart vs other . Follow up instructions and discharge meds at bottom of this note. Plan was discussed with staff and daughter 11/27. All questions answered. Patient was stable at time of discharge. Diagnostic Imaging/Tests:   Xr Chest Port    Result Date: 11/23/2018  CHEST X-RAY, single portable view  11/23/2018 History: Chest pain. Technique: Single frontal view of the chest. Comparison: Chest x-ray 9/14/2018 Findings: The cardiac silhouette is moderate to severely enlarged although stable. The lungs are expanded without evidence for pneumothorax. No consolidative airspace process or pleural effusion is seen. Stable moderate to advanced degenerative changes are seen of the bilateral shoulders. IMPRESSION: 1.  Stable moderate to severe cardiomegaly. No evolving acute changes are seen. Echocardiogram results:  No results found for this visit on 11/23/18.       All Micro Results     Procedure Component Value Units Date/Time    CULTURE, URINE [[id number]]  (Abnormal)  (Susceptibility) Collected:  11/23/18 3255    Order Status:  Completed Specimen:  Urine Updated:  11/27/18 4230 Special Requests: NO SPECIAL REQUESTS        Culture result:       >100,000 COLONIES/mL ESCHERICHIA COLI                Labs: Results:       BMP, Mg, Phos Recent Labs     11/28/18  0510 11/27/18  0546    132*   K 3.3* 3.0*   CL 94* 90*   CO2 34* 34*   AGAP 8 8   * 155*   CREA 1.40* 1.47*   CA 10.2 10.1   * 96   MG  --  2.0   PHOS  --  2.7      CBC Recent Labs     11/28/18  0510 11/27/18  2228 11/27/18  0546   WBC 3.4*  --  3.5*   RBC 3.50*  --  2.51*   HGB 9.6* 9.4* 6.7*   HCT 30.0* 29.3* 21.6*   *  --  143*   GRANS 73  --  68   LYMPH 15  --  17   EOS 2  --  3   MONOS 10  --  12   BASOS 0  --  0   IG 0  --  1   ANEU 2.5  --  2.4   ABL 0.5  --  0.6   LING 0.1  --  0.1   ABM 0.4  --  0.4   ABB 0.0  --  0.0   AIG 0.0  --  0.0      LFT No results for input(s): SGOT, ALT, TBIL, AP, TP, ALB, GLOB, AGRAT, GPT in the last 72 hours.    Cardiac Testing Lab Results   Component Value Date/Time     08/16/2018 09:54 AM     08/24/2016 12:12 PM     08/10/2016 05:00 PM     07/26/2016 07:25 AM    B-type Natriuretic Peptide 69.2 10/05/2017 11:38 AM    Troponin-I, Qt. 0.03 11/24/2018 05:14 AM    Troponin-I, Qt. 0.03 11/24/2018 12:53 AM    Troponin-I, Qt. 0.02 09/14/2018 11:04 AM      Coagulation Tests Lab Results   Component Value Date/Time    Prothrombin time 23.7 (H) 11/23/2018 08:40 PM    Prothrombin time 11.6 (H) 08/17/2013 02:55 PM    Prothrombin time 11.6 (H) 08/07/2013 10:40 PM    INR 2.2 11/23/2018 08:40 PM    INR 1.1 08/17/2013 02:55 PM    INR 1.1 08/07/2013 10:40 PM    aPTT 58.4 (H) 11/23/2018 08:40 PM    aPTT 31.2 08/07/2013 10:40 PM    aPTT 48.8 (H) 10/29/2009 06:00 AM      A1c Lab Results   Component Value Date/Time    Hemoglobin A1c 8.1 (H) 11/26/2018 05:26 AM    Hemoglobin A1c CANCELED 10/22/2018 12:02 PM    Hemoglobin A1c 8.2 (H) 09/08/2018 05:50 AM      Lipid Panel Lab Results   Component Value Date/Time    Cholesterol, total 128 07/18/2017 02:54 PM    HDL Cholesterol 56 07/18/2017 02:54 PM    LDL, calculated 46 07/18/2017 02:54 PM    VLDL, calculated 26 07/18/2017 02:54 PM    Triglyceride 129 07/18/2017 02:54 PM    CHOL/HDL Ratio 1.8 08/09/2013 06:13 AM      Thyroid Panel Lab Results   Component Value Date/Time    TSH 2.690 10/22/2018 12:02 PM    TSH 2.730 04/11/2018 04:20 PM        Most Recent UA Lab Results   Component Value Date/Time    Color YELLOW 11/28/2018 12:15 AM    Appearance CLEAR 11/28/2018 12:15 AM    Specific gravity 1.011 11/28/2018 12:15 AM    pH (UA) 6.5 11/28/2018 12:15 AM    Protein NEGATIVE  11/28/2018 12:15 AM    Glucose NEGATIVE  11/28/2018 12:15 AM    Ketone NEGATIVE  11/28/2018 12:15 AM    Bilirubin NEGATIVE  11/28/2018 12:15 AM    Blood SMALL (A) 11/28/2018 12:15 AM    Urobilinogen 0.2 11/28/2018 12:15 AM    Nitrites NEGATIVE  11/28/2018 12:15 AM    Leukocyte Esterase MODERATE (A) 11/28/2018 12:15 AM        Allergies   Allergen Reactions    Benazepril Angioedema     Tongue swelling    Dynacirc [Isradipine] Angioedema     Tongue swelling    Peanut Angioedema     Tongue swelling    Morphine Other (comments)     Unknown rxn, \"morphine just does not agree with me\"     Immunization History   Administered Date(s) Administered    Influenza Vaccine 10/05/2015    Influenza Vaccine (Quad) PF 09/14/2018    Pneumococcal Conjugate (PCV-13) 06/14/2017    TB Skin Test (PPD) Intradermal 08/19/2013, 09/07/2018       All Labs from Last 24 Hrs:  Recent Results (from the past 24 hour(s))   GLUCOSE, POC    Collection Time: 11/27/18  8:01 AM   Result Value Ref Range    Glucose (POC) 112 (H) 65 - 100 mg/dL   GLUCOSE, POC    Collection Time: 11/27/18 11:00 AM   Result Value Ref Range    Glucose (POC) 148 (H) 65 - 100 mg/dL   TYPE + CROSSMATCH    Collection Time: 11/27/18 11:56 AM   Result Value Ref Range    Crossmatch Expiration 11/30/2018     ABO/Rh(D) O POSITIVE     Antibody screen NEG     Unit number [id number]     Blood component type RC LR     Unit division 00     Status of unit TRANSFUSED     Crossmatch result Compatible     Unit number [id number]     Blood component type RC LR     Unit division 00     Status of unit TRANSFUSED     Crossmatch result Compatible    GLUCOSE, POC    Collection Time: 11/27/18  4:18 PM   Result Value Ref Range    Glucose (POC) 195 (H) 65 - 100 mg/dL   GLUCOSE, POC    Collection Time: 11/27/18  9:10 PM   Result Value Ref Range    Glucose (POC) 199 (H) 65 - 100 mg/dL   HGB & HCT    Collection Time: 11/27/18 10:28 PM   Result Value Ref Range    HGB 9.4 (L) 11.7 - 15.4 g/dL    HCT 29.3 (L) 35.8 - 46.3 %   URINALYSIS W/ RFLX MICROSCOPIC    Collection Time: 11/28/18 12:15 AM   Result Value Ref Range    Color YELLOW      Appearance CLEAR      Specific gravity 1.011 1.001 - 1.023      pH (UA) 6.5 5.0 - 9.0      Protein NEGATIVE  NEG mg/dL    Glucose NEGATIVE  mg/dL    Ketone NEGATIVE  NEG mg/dL    Bilirubin NEGATIVE  NEG      Blood SMALL (A) NEG      Urobilinogen 0.2 0.2 - 1.0 EU/dL    Nitrites NEGATIVE  NEG      Leukocyte Esterase MODERATE (A) NEG      WBC 5-10 0 /hpf    RBC 3-5 0 /hpf    Epithelial cells 0-3 0 /hpf    Bacteria 0 0 /hpf    Casts 0 0 /lpf   METABOLIC PANEL, BASIC    Collection Time: 11/28/18  5:10 AM   Result Value Ref Range    Sodium 136 136 - 145 mmol/L    Potassium 3.3 (L) 3.5 - 5.1 mmol/L    Chloride 94 (L) 98 - 107 mmol/L    CO2 34 (H) 21 - 32 mmol/L    Anion gap 8 7 - 16 mmol/L    Glucose 134 (H) 65 - 100 mg/dL     (H) 8 - 23 MG/DL    Creatinine 1.40 (H) 0.6 - 1.0 MG/DL    GFR est AA 46 (L) >60 ml/min/1.73m2    GFR est non-AA 38 (L) >60 ml/min/1.73m2    Calcium 10.2 8.3 - 10.4 MG/DL   CBC WITH AUTOMATED DIFF    Collection Time: 11/28/18  5:10 AM   Result Value Ref Range    WBC 3.4 (L) 4.3 - 11.1 K/uL    RBC 3.50 (L) 4.05 - 5.2 M/uL    HGB 9.6 (L) 11.7 - 15.4 g/dL    HCT 30.0 (L) 35.8 - 46.3 %    MCV 85.7 79.6 - 97.8 FL    MCH 27.4 26.1 - 32.9 PG    MCHC 32.0 31.4 - 35.0 g/dL    RDW 14.6 %    PLATELET 533 (L) 150 - 450 K/uL    MPV 11.9 9.4 - 12.3 FL    ABSOLUTE NRBC 0.00 0.0 - 0.2 K/uL    DF AUTOMATED      NEUTROPHILS 73 43 - 78 %    LYMPHOCYTES 15 13 - 44 %    MONOCYTES 10 4.0 - 12.0 %    EOSINOPHILS 2 0.5 - 7.8 %    BASOPHILS 0 0.0 - 2.0 %    IMMATURE GRANULOCYTES 0 0.0 - 5.0 %    ABS. NEUTROPHILS 2.5 1.7 - 8.2 K/UL    ABS. LYMPHOCYTES 0.5 0.5 - 4.6 K/UL    ABS. MONOCYTES 0.4 0.1 - 1.3 K/UL    ABS. EOSINOPHILS 0.1 0.0 - 0.8 K/UL    ABS. BASOPHILS 0.0 0.0 - 0.2 K/UL    ABS. IMM. GRANS. 0.0 0.0 - 0.5 K/UL       Discharge Exam:  Patient Vitals for the past 24 hrs:   Temp Pulse Resp BP SpO2   11/28/18 0454 98.5 °F (36.9 °C) 60 17 120/62 100 %   11/27/18 2330 98.8 °F (37.1 °C) 67 18 114/72 100 %   11/27/18 2113 98.4 °F (36.9 °C) 68 18 116/71 100 %   11/27/18 2004 98.6 °F (37 °C) 64 18 124/69 100 %   11/27/18 1834 97.9 °F (36.6 °C) 63 18 112/59 100 %   11/27/18 1833 97.8 °F (36.6 °C) 68 18 130/69 100 %   11/27/18 1614 97.5 °F (36.4 °C) 93 18 120/69 100 %   11/27/18 1530 98 °F (36.7 °C) 73 18 103/55 100 %   11/27/18 1430 97.4 °F (36.3 °C) 64 18 114/68 100 %   11/27/18 1337 97.8 °F (36.6 °C) 63 18 110/63 100 %   11/27/18 1322 97.9 °F (36.6 °C) 65 18 116/65 96 %   11/27/18 1147 97.9 °F (36.6 °C) 64 18 128/73 100 %   11/27/18 1140     99 %   11/27/18 0809  72  130/75    11/27/18 0806 98.2 °F (36.8 °C) 70 16 130/75 99 %     Oxygen Therapy  O2 Sat (%): 100 % (11/28/18 0454)  Pulse via Oximetry: 77 beats per minute (11/25/18 1006)  O2 Device: Nasal cannula (11/27/18 1140)  O2 Flow Rate (L/min): 1 l/min(wanted to leave 02 on) (11/27/18 1140)    Intake/Output Summary (Last 24 hours) at 11/28/2018 0738  Last data filed at 11/28/2018 0454  Gross per 24 hour   Intake 600 ml   Output 850 ml   Net -250 ml         Physical exam:  General:    Well nourished. Alert. No distress. Eyes:   Normal sclera. Extraocular movements intact. ENT:  Normocephalic, atraumatic. Moist mucous membranes  CV:   Irregular, rate controlled.   No murmur, rub, or gallop. Lungs:  Clear to auscultation bilaterally. No wheezing, rhonchi, or rales. Abdomen: Soft, nontender, nondistended. Bowel sounds normal.   Extremities: Warm and dry. No cyanosis or edema. Neurologic: No focal deficits  Skin:     No rashes or jaundice. Psych:  Short term recall poor to absent. Discharge Info:   Current Discharge Medication List      START taking these medications    Details   potassium chloride (K-DUR, KLOR-CON) 20 mEq tablet Take 1 Tab by mouth daily. Qty: 30 Tab, Refills: 0      glimepiride (AMARYL) 2 mg tablet Take 1 Tab by mouth Daily (before breakfast). Qty: 30 Tab, Refills: 0      aspirin delayed-release 325 mg tablet Take 1 Tab by mouth daily. Qty: 30 Tab, Refills: 0         CONTINUE these medications which have NOT CHANGED    Details   furosemide (LASIX) 20 mg tablet Take 1 Tab by mouth daily. Qty: 30 Tab, Refills: 0    Associated Diagnoses: Edema, unspecified type      isosorbide mononitrate ER (IMDUR) 30 mg tablet TAKE 1 TABLET BY MOUTH DAILY. Qty: 90 Tab, Refills: 3      pantoprazole (PROTONIX) 40 mg tablet TAKE 1 TABLET BY MOUTH DAILY (BEFORE BREAKFAST). Qty: 90 Tab, Refills: 0      gabapentin (NEURONTIN) 300 mg capsule Take 1 Cap by mouth three (3) times daily. Qty: 90 Cap, Refills: 1    Associated Diagnoses: Pain in both lower extremities      carvedilol (COREG) 12.5 mg tablet TAKE 1 TABLET BY MOUTH TWO (2) TIMES DAILY (WITH MEALS). Qty: 180 Tab, Refills: 3      ascorbic acid, vitamin C, (VITAMIN C) 500 mg tablet Take 500 mg by mouth daily. cholecalciferol (VITAMIN D3) 1,000 unit cap Take 1 Cap by mouth daily.   Qty: 90 Cap, Refills: 4         STOP taking these medications       metOLazone (ZAROXOLYN) 5 mg tablet Comments:   Reason for Stopping:         rivaroxaban (XARELTO) 15 mg tab tablet Comments:   Reason for Stopping:         potassium chloride SA (MICRO-K) 10 mEq capsule Comments:   Reason for Stopping:         aspirin 81 mg tablet Comments:   Reason for Stopping:                 Disposition: home hospice care    Activity: Bedrest  Diet: DIET DIABETIC WITH OPTIONS Consistent Carb 1800kcal; Regular    Follow-up Appointments   Procedures    FOLLOW UP VISIT Appointment in: 3 - 5 Days Follow up primary care as soon as possible re: med changes this hospital stay and electrolyte, hb and creatinine monitoring     Follow up primary care as soon as possible re: med changes this hospital stay and electrolyte, hb and creatinine monitoring     Standing Status:   Standing     Number of Occurrences:   1     Order Specific Question:   Appointment in     Answer:   3 - 5 Days         Follow-up Information     Follow up With Specialties Details Why Contact Info    Bhupendra Jaramillo MD Internal Medicine   [address] [place] [address]  [facility]  [phone]              Time spent in patient discharge planning and coordination 42 minutes.

## 2018-11-28 NOTE — PROGRESS NOTES
Shift Summary: 
 
Patient rested off and on overnight. The patient is alert and intermittently confused. The patient has active bowel sounds and is voiding well with Purewick. Urine is clear and yellow. VSS. Patient is on bedrest. No needs stated at this time. Patient resting peacefully in bed. Bed in low position. All personal items within reach. Will continue to monitor and give bedside shift report to oncoming day shift nurse.

## 2018-11-29 NOTE — ADT AUTH CERT NOTES
Per message from nurse: 
 
Nothing to add. Facility Name: [facility]           
  
  
  
  
  
   
Patient Demographics Patient Name Eligio SANCHEZ Sex Female  
1935 Address [address] Phone [phone] (Home) *Preferred* 
[phone] [facility] Hospital Account Name Acct ID Class Status Primary Coverage Marychuy Bender [id number] [address] Discharged/Not Billed [employer] MEDICARE - [address] HMO  
  
   
Guarantor Account (for Hospital Account [de-identified]) Name Relation to Pt Service Area Active? Acct Type Eligio Fisher B Self [address] Yes Personal/Family Address Phone [address] [facility] [address], [address] [facility] [phone](Z)    
  
   
Coverage Information (for Hospital Account [de-identified]) F/O Payor/Plan Subscriber  Subscriber Sex Precert # [address] [facility] [birth year] F Subscriber Subscriber # Marychuy Bender [id number] Grp # Group Name [id number] [address] Address Phone [address] [address] [address] Policy Number Status Effective Date Benefits Phone [id number] -  - Auth/Cert OUI#[id number]  
  
   
Diagnosis Codes Comments Gross hematuria  ICD-10-CM: R31.0 ICD-9-CM: 599.71   
  
   
Admission Information Arrival Date/Time: 2018 Admit Date/Time: 2018  Adm. Date/Time: 2018 2240 Admission Type: Emergency Point of Origin: Non-health Care Facility/self Admit Category: Home Means of Arrival: Ambulance Primary Service: Medicine Secondary Service: N/A Transfer Source:  Service Area: [address] [place] [address] Unit: [facility] 6 MED SURG Admit Provider: Isela Hooker MD Attending Provider: Bruce Barrera MD Referring Provider:   
Admission Information Attending Provider Admission Dx Admitted On  
  18 Service Isolation Code Status MEDICINE  Prior Allergies Advance Care Planning Benazepril, Dynacirc [Isradipine], Peanut, Morphine Jump to the Activity    
Admission Information Unit/Bed: [facility] 6 MED SURG/01 Service: MEDICINE Admitting provider: Yoon Johnson MD Phone: [phone] Attending provider:  Phone: PCP: Dorothy Mejía MD Phone: [phone] Admission dx: Hematuria Patient class: I Admission type: ER

## 2018-11-29 NOTE — ADT AUTH CERT NOTES
Per message from nurse: 
 
 
Note added. Nothing more to add. Patient Demographics Patient Name Raj Abbasi 
[id number] Sex Female  
1935 Address [address] Phone [phone] (Home) *Preferred* 
[phone] [facility] CSN:  
[id number] Admit Date: Admit Time Room Bed 2018  8:20  [[id number]] 01 Sarah Lara Attending Providers Provider Pager From To [place] MD  18 Earl Antonio MD  18 Emergency Contact(s) Name Relation Home Work Mobile Angelica Arceo Daughter [phone] Utilization Reviews  IM NOTE - AS REQUESTED by Aniyah Muhammad RN Review Entered Review Status 2018 11:50 In Primary Criteria Review Today: 
Hb 6.7 and serum k yesterday 3.0. Hospice care but family and pt. Came off hospice to be admitted. Sonia Mo is very weak. Hx of significant recurrent admissions for chf. Plan is d.c. Back home to hospice care. I placed call for daughter Aida Mccray [phone] re: discuss long term plan of care as future hospitalizations likely. Mariza Gell to correct k and transfuse to hb > 7 prior to discharge. 
  
  
Anemia (2012) 
  transfuse hb > 7   
  DNR (do not resuscitate) (2012)   
  Stage 4 chronic kidney disease ([facility] [address] (12/3/2012) 
    Overview: Follow closely [facility].  Last GFR now at 22 (from 31).   
  
  Acute blood loss anemia (2018) Due to hematuria, mostly resolved. xarelto restart? ? Discuss with family-call placed 
  
  Cardiomyopathy ([facility] [address] (2009) 
    Overview: Following with Cardiology. 
  
  Lymphedema of both lower extremities (2018) 
    Overview: Pt on maximum diuretic therapy; still with symptomatic bilateral LE edema;  
    refer to PT to evaluated for lymphedema therapy. 
  
  Bedbound (2018)   
  Hematuria (2018)   
  Acute cystitis with hematuria (2018) On iv rocephin. ecoli sens to bactrim res quinolones--po bactrim POSSIBLE DC HOME TMW WITH HOSPICE CARE TO RESUME-clarify goals of care with daughter ADDENDUM-- FINISHES 5 DAY COURSE ROCEPHIN TODAY --RECHECK UA AND AVOID BACTRIM IF POSSIBLE RE: CKD ADVANCED 
  
  Uncontrolled type 2 diabetes mellitus with hyperglycemia ([facility] [address] (11/26/2018) A1C is 8.1% on oral agents.  Will continue lantus 10 units daily with SSI.  Consult diabetic educator. Michelle Mendoza states this is NOT a new dx.  
  
Possibly home tomorrow with home hospice on oral abx? this is unchanged from 11/26 --dc delay secondary to anemia

## 2019-01-26 ENCOUNTER — HOSPITAL ENCOUNTER (OUTPATIENT)
Age: 84
Setting detail: OBSERVATION
Discharge: HOME HEALTH | End: 2019-01-31
Attending: EMERGENCY MEDICINE | Admitting: INTERNAL MEDICINE
Payer: MEDICARE

## 2019-01-26 DIAGNOSIS — E16.2 HYPOGLYCEMIA: Primary | ICD-10-CM

## 2019-01-26 PROBLEM — N17.9 AKI (ACUTE KIDNEY INJURY) (HCC): Status: ACTIVE | Noted: 2019-01-26

## 2019-01-26 LAB
ALBUMIN SERPL-MCNC: 3.6 G/DL (ref 3.2–4.6)
ALBUMIN/GLOB SERPL: 0.8 {RATIO} (ref 1.2–3.5)
ALP SERPL-CCNC: 69 U/L (ref 50–136)
ALT SERPL-CCNC: 11 U/L (ref 12–65)
ANION GAP SERPL CALC-SCNC: 6 MMOL/L (ref 7–16)
AST SERPL-CCNC: 14 U/L (ref 15–37)
BASOPHILS # BLD: 0 K/UL (ref 0–0.2)
BASOPHILS NFR BLD: 1 % (ref 0–2)
BILIRUB SERPL-MCNC: 0.4 MG/DL (ref 0.2–1.1)
BUN SERPL-MCNC: 75 MG/DL (ref 8–23)
CALCIUM SERPL-MCNC: 9.8 MG/DL (ref 8.3–10.4)
CHLORIDE SERPL-SCNC: 99 MMOL/L (ref 98–107)
CO2 SERPL-SCNC: 34 MMOL/L (ref 21–32)
CREAT SERPL-MCNC: 1.61 MG/DL (ref 0.6–1)
DIFFERENTIAL METHOD BLD: ABNORMAL
EOSINOPHIL # BLD: 0 K/UL (ref 0–0.8)
EOSINOPHIL NFR BLD: 1 % (ref 0.5–7.8)
ERYTHROCYTE [DISTWIDTH] IN BLOOD BY AUTOMATED COUNT: 15.6 % (ref 11.9–14.6)
GLOBULIN SER CALC-MCNC: 4.5 G/DL (ref 2.3–3.5)
GLUCOSE BLD STRIP.AUTO-MCNC: 169 MG/DL (ref 65–100)
GLUCOSE BLD STRIP.AUTO-MCNC: 25 MG/DL (ref 65–100)
GLUCOSE BLD STRIP.AUTO-MCNC: 33 MG/DL (ref 65–100)
GLUCOSE BLD STRIP.AUTO-MCNC: 51 MG/DL (ref 65–100)
GLUCOSE BLD STRIP.AUTO-MCNC: 58 MG/DL (ref 65–100)
GLUCOSE BLD STRIP.AUTO-MCNC: 79 MG/DL (ref 65–100)
GLUCOSE BLD STRIP.AUTO-MCNC: 96 MG/DL (ref 65–100)
GLUCOSE SERPL-MCNC: 97 MG/DL (ref 65–100)
HCT VFR BLD AUTO: 30.2 % (ref 35.8–46.3)
HGB BLD-MCNC: 9.1 G/DL (ref 11.7–15.4)
IMM GRANULOCYTES # BLD AUTO: 0 K/UL (ref 0–0.5)
IMM GRANULOCYTES NFR BLD AUTO: 1 % (ref 0–5)
LACTATE BLD-SCNC: 0.89 MMOL/L (ref 0.5–1.9)
LYMPHOCYTES # BLD: 0.5 K/UL (ref 0.5–4.6)
LYMPHOCYTES NFR BLD: 13 % (ref 13–44)
MCH RBC QN AUTO: 27.2 PG (ref 26.1–32.9)
MCHC RBC AUTO-ENTMCNC: 30.1 G/DL (ref 31.4–35)
MCV RBC AUTO: 90.1 FL (ref 79.6–97.8)
MONOCYTES # BLD: 0.4 K/UL (ref 0.1–1.3)
MONOCYTES NFR BLD: 10 % (ref 4–12)
NEUTS SEG # BLD: 3.1 K/UL (ref 1.7–8.2)
NEUTS SEG NFR BLD: 76 % (ref 43–78)
NRBC # BLD: 0 K/UL (ref 0–0.2)
PLATELET # BLD AUTO: 152 K/UL (ref 150–450)
PMV BLD AUTO: 11.7 FL (ref 9.4–12.3)
POTASSIUM SERPL-SCNC: 3.7 MMOL/L (ref 3.5–5.1)
PROT SERPL-MCNC: 8.1 G/DL (ref 6.3–8.2)
RBC # BLD AUTO: 3.35 M/UL (ref 4.05–5.2)
SODIUM SERPL-SCNC: 139 MMOL/L (ref 136–145)
WBC # BLD AUTO: 4.1 K/UL (ref 4.3–11.1)

## 2019-01-26 PROCEDURE — 99285 EMERGENCY DEPT VISIT HI MDM: CPT | Performed by: EMERGENCY MEDICINE

## 2019-01-26 PROCEDURE — 96374 THER/PROPH/DIAG INJ IV PUSH: CPT | Performed by: EMERGENCY MEDICINE

## 2019-01-26 PROCEDURE — 96366 THER/PROPH/DIAG IV INF ADDON: CPT | Performed by: EMERGENCY MEDICINE

## 2019-01-26 PROCEDURE — 96375 TX/PRO/DX INJ NEW DRUG ADDON: CPT | Performed by: EMERGENCY MEDICINE

## 2019-01-26 PROCEDURE — 99218 HC RM OBSERVATION: CPT

## 2019-01-26 PROCEDURE — 80053 COMPREHEN METABOLIC PANEL: CPT

## 2019-01-26 PROCEDURE — 74011000258 HC RX REV CODE- 258: Performed by: INTERNAL MEDICINE

## 2019-01-26 PROCEDURE — 74011000250 HC RX REV CODE- 250

## 2019-01-26 PROCEDURE — 82962 GLUCOSE BLOOD TEST: CPT

## 2019-01-26 PROCEDURE — 85025 COMPLETE CBC W/AUTO DIFF WBC: CPT

## 2019-01-26 PROCEDURE — 83605 ASSAY OF LACTIC ACID: CPT

## 2019-01-26 PROCEDURE — 96365 THER/PROPH/DIAG IV INF INIT: CPT | Performed by: EMERGENCY MEDICINE

## 2019-01-26 RX ORDER — DEXTROSE MONOHYDRATE 100 MG/ML
30 INJECTION, SOLUTION INTRAVENOUS CONTINUOUS
Status: DISCONTINUED | OUTPATIENT
Start: 2019-01-26 | End: 2019-01-27

## 2019-01-26 RX ORDER — PANTOPRAZOLE SODIUM 40 MG/1
40 TABLET, DELAYED RELEASE ORAL
Status: DISCONTINUED | OUTPATIENT
Start: 2019-01-27 | End: 2019-01-29

## 2019-01-26 RX ORDER — DEXTROSE 50 % IN WATER (D50W) INTRAVENOUS SYRINGE
Status: COMPLETED
Start: 2019-01-26 | End: 2019-01-26

## 2019-01-26 RX ORDER — DEXTROSE 50 % IN WATER (D50W) INTRAVENOUS SYRINGE
50
Status: COMPLETED | OUTPATIENT
Start: 2019-01-26 | End: 2019-01-26

## 2019-01-26 RX ORDER — ASPIRIN 325 MG
325 TABLET, DELAYED RELEASE (ENTERIC COATED) ORAL DAILY
Status: DISCONTINUED | OUTPATIENT
Start: 2019-01-27 | End: 2019-01-29

## 2019-01-26 RX ORDER — ISOSORBIDE MONONITRATE 30 MG/1
30 TABLET, EXTENDED RELEASE ORAL DAILY
Status: DISCONTINUED | OUTPATIENT
Start: 2019-01-27 | End: 2019-01-31 | Stop reason: HOSPADM

## 2019-01-26 RX ORDER — POTASSIUM CHLORIDE 20 MEQ/1
20 TABLET, EXTENDED RELEASE ORAL DAILY
Status: DISCONTINUED | OUTPATIENT
Start: 2019-01-27 | End: 2019-01-28

## 2019-01-26 RX ORDER — CARVEDILOL 12.5 MG/1
12.5 TABLET ORAL 2 TIMES DAILY WITH MEALS
Status: DISCONTINUED | OUTPATIENT
Start: 2019-01-27 | End: 2019-01-31 | Stop reason: HOSPADM

## 2019-01-26 RX ORDER — GABAPENTIN 300 MG/1
300 CAPSULE ORAL 3 TIMES DAILY
Status: DISCONTINUED | OUTPATIENT
Start: 2019-01-26 | End: 2019-01-31 | Stop reason: HOSPADM

## 2019-01-26 RX ORDER — FUROSEMIDE 20 MG/1
20 TABLET ORAL DAILY
Status: DISCONTINUED | OUTPATIENT
Start: 2019-01-27 | End: 2019-01-31 | Stop reason: HOSPADM

## 2019-01-26 RX ADMIN — DEXTROSE MONOHYDRATE 30 ML/HR: 10 INJECTION, SOLUTION INTRAVENOUS at 23:00

## 2019-01-26 RX ADMIN — DEXTROSE 50 % IN WATER (D50W) INTRAVENOUS SYRINGE 25 G: at 16:26

## 2019-01-26 RX ADMIN — DEXTROSE MONOHYDRATE 30 ML/HR: 10 INJECTION, SOLUTION INTRAVENOUS at 21:15

## 2019-01-26 NOTE — ED TRIAGE NOTES
Patient arrives via EMS for hypoglycemia. Family called home health nurse because patient was not acting right. Patient was seen by home health nurse today and was given 1mg of ativan. EMS called by family due to no relief after home health saw patient. BGL 27 upon EMS arrival. Other VSS. 250 ml D10 given by EMS. BGL up to 92. Last check of BGL was 85 for EMS.

## 2019-01-26 NOTE — ED NOTES
Family states pt was acting herself around 830pm last night, and this morning at 9AM pt was found incoherant, family concerned for UTI. Family states nurse thought facial droop last night but family states pt is normal right now.

## 2019-01-26 NOTE — ED NOTES
Patient's brief and linens were soaked with urine. Brief, linens, and clothing were changed with help from family member.

## 2019-01-26 NOTE — ED PROVIDER NOTES
Here after being found with altered mental status. area and she was found to have a blood sugar in the mid 20s. At that time. He is not checked her blood sugars all week due to being out of her strips. A home health nurse thought that she might be having anxiety and for her symptoms, was given Ativanfor this, as expected, did not change things. She is on hospice due to end-stage heart failure. Has not eaten today by her report. She did take her medications. No fevers or chills. The history is provided by the patient and a relative. Low Blood Sugar This is a new problem. Pertinent negatives include no confusion, no somnolence, no unresponsiveness, no weakness and no agitation. Past Medical History:  
Diagnosis Date  Acute gout involving toe of left foot 5/19/2017  CAD (coronary artery disease) 2007 \"irregular heart beat\"  Cardiomyopathy ([address] 11/25/2009  Chronic indwelling Harper catheter  Chronic kidney disease 2002  
 on HD since Nov 2012  Chronic systolic heart failure ([address] 2007  Colon polyps 2016  CVA (cerebral vascular accident) ([address]  CVA (cerebral vascular accident) ([address] 8/8/2016  Deep vein thrombosis (DVT) of lower extremity ([address] 5/27/2016  Diabetes ([address] 2002  Diabetes mellitus ([address] 6/20/2012  Duodenal ulcer  Duodenal ulcer 11/18/2012  Duodenal ulcer 11/18/2012  Dyslipidemia 8/8/2016  Essential hypertension 10/26/2009  
 Heart failure ([address]  Hypertension 2009  Hypoglycemia, unspecified 12/3/2012  JOÃO (iron deficiency anemia)  Obesity, morbid 11/25/2009  Other ill-defined conditions(799.89) 2002 Lymphedema  Persistent atrial fibrillation ([address] 10/26/2009  Pulmonary HTN ([address]  SBO (small bowel obstruction) ([address] 8/8/2013  Sleep apnea 2010  
 non-compliant with CPAP  Sleep apnea - noncompliant with CPAP 11/13/2012  Type II diabetes mellitus, uncontrolled ([address] 6/20/2012  Urinary tract infection 12/3/2012  UTI (lower urinary tract infection) 8/8/2013  UTI (urinary tract infection) 11/23/2012 Past Surgical History:  
Procedure Laterality Date  COLONOSCOPY N/A 6/20/2016 COLONOSCOPY performed by Christia Hodgkins, MD at [facility] ENDOSCOPY  
 HX COLONOSCOPY  2016  
 polyp  HX HYSTERECTOMY  >20 years ago  HX TUBAL LIGATION  >40 years ago Family History:  
Problem Relation Age of Onset  Heart Disease Sister  Diabetes Other Social History Socioeconomic History  Marital status:  Spouse name: Not on file  Number of children: Not on file  Years of education: Not on file  Highest education level: Not on file Social Needs  Financial resource strain: Not on file  Food insecurity - worry: Not on file  Food insecurity - inability: Not on file  Transportation needs - medical: Not on file  Transportation needs - non-medical: Not on file Occupational History  Not on file Tobacco Use  Smoking status: Never Smoker  Smokeless tobacco: Never Used Substance and Sexual Activity  Alcohol use: No  
 Drug use: No  
 Sexual activity: Not on file Other Topics Concern  Not on file Social History Narrative , lives at home with spouse. Retired day care worker ALLERGIES: Benazepril; Dynacirc [isradipine]; Peanut; and Morphine Review of Systems Constitutional: Negative for chills and fever. HENT: Negative. Respiratory: Negative for shortness of breath and wheezing. Cardiovascular: Negative. Gastrointestinal: Negative. Genitourinary: Negative. Musculoskeletal: Negative. Neurological: Negative. Negative for weakness. Psychiatric/Behavioral: Negative for agitation and confusion. All other systems reviewed and are negative. Vitals:  
 01/26/19 1355 01/26/19 1503 BP: 150/62 146/70 Pulse:  72 Resp: 16 SpO2:  98% Physical Exam  
 Constitutional: She appears well-developed and well-nourished. No distress. Pleasant and cooperative HENT:  
Head: Atraumatic. Nose: Nose normal.  
Mouth/Throat: Oropharynx is clear and moist.  
Eyes: No scleral icterus. Neck: Neck supple. Cardiovascular: Normal rate. Pulmonary/Chest: Effort normal. No respiratory distress. She has no wheezes. She has no rales. Abdominal: Soft. She exhibits no distension. There is no tenderness. Musculoskeletal: She exhibits no edema or deformity. Neurological: She is alert. when blood sugar is corrected. There are no focal abnormalities Skin: Skin is warm and dry. Psychiatric: Thought content normal.  
Nursing note and vitals reviewed. MDM Number of Diagnoses or Management Options Diagnosis management comments: H and is on oral agents with recurring episodes of hypoglycemia. This is corrected with medications. Interventions in our department, only with recurrence several times. She was given oral intake. In addition. If she fails to maintain adequate blood sugar. She will be admitted. I have discussed this with the hospitalist. 
 
  
Amount and/or Complexity of Data Reviewed Clinical lab tests: reviewed and ordered Decide to obtain previous medical records or to obtain history from someone other than the patient: yes Obtain history from someone other than the patient: yes Risk of Complications, Morbidity, and/or Mortality Presenting problems: moderate Diagnostic procedures: low Management options: moderate Patient Progress Patient progress: stable Procedures

## 2019-01-27 LAB
ALBUMIN SERPL-MCNC: 3.4 G/DL (ref 3.2–4.6)
ALBUMIN/GLOB SERPL: 0.9 {RATIO} (ref 1.2–3.5)
ALP SERPL-CCNC: 66 U/L (ref 50–136)
ALT SERPL-CCNC: 10 U/L (ref 12–65)
ANION GAP SERPL CALC-SCNC: 7 MMOL/L (ref 7–16)
APPEARANCE UR: ABNORMAL
AST SERPL-CCNC: 9 U/L (ref 15–37)
BACTERIA URNS QL MICRO: ABNORMAL /HPF
BILIRUB SERPL-MCNC: 0.4 MG/DL (ref 0.2–1.1)
BILIRUB UR QL: NEGATIVE
BUN SERPL-MCNC: 69 MG/DL (ref 8–23)
CALCIUM SERPL-MCNC: 9.4 MG/DL (ref 8.3–10.4)
CHLORIDE SERPL-SCNC: 95 MMOL/L (ref 98–107)
CO2 SERPL-SCNC: 30 MMOL/L (ref 21–32)
COLOR UR: YELLOW
CREAT SERPL-MCNC: 1.67 MG/DL (ref 0.6–1)
EPI CELLS #/AREA URNS HPF: ABNORMAL /HPF
ERYTHROCYTE [DISTWIDTH] IN BLOOD BY AUTOMATED COUNT: 15.3 % (ref 11.9–14.6)
EST. AVERAGE GLUCOSE BLD GHB EST-MCNC: 128 MG/DL
GLOBULIN SER CALC-MCNC: 4 G/DL (ref 2.3–3.5)
GLUCOSE BLD STRIP.AUTO-MCNC: 151 MG/DL (ref 65–100)
GLUCOSE BLD STRIP.AUTO-MCNC: 172 MG/DL (ref 65–100)
GLUCOSE BLD STRIP.AUTO-MCNC: 193 MG/DL (ref 65–100)
GLUCOSE BLD STRIP.AUTO-MCNC: 217 MG/DL (ref 65–100)
GLUCOSE BLD STRIP.AUTO-MCNC: 270 MG/DL (ref 65–100)
GLUCOSE BLD STRIP.AUTO-MCNC: 274 MG/DL (ref 65–100)
GLUCOSE SERPL-MCNC: 256 MG/DL (ref 65–100)
GLUCOSE UR STRIP.AUTO-MCNC: NEGATIVE MG/DL
HBA1C MFR BLD: 6.1 % (ref 4.8–6)
HCT VFR BLD AUTO: 27.4 % (ref 35.8–46.3)
HGB BLD-MCNC: 8.2 G/DL (ref 11.7–15.4)
HGB UR QL STRIP: ABNORMAL
KETONES UR QL STRIP.AUTO: NEGATIVE MG/DL
LEUKOCYTE ESTERASE UR QL STRIP.AUTO: ABNORMAL
MAGNESIUM SERPL-MCNC: 1.9 MG/DL (ref 1.8–2.4)
MCH RBC QN AUTO: 27.2 PG (ref 26.1–32.9)
MCHC RBC AUTO-ENTMCNC: 29.9 G/DL (ref 31.4–35)
MCV RBC AUTO: 90.7 FL (ref 79.6–97.8)
NITRITE UR QL STRIP.AUTO: NEGATIVE
NRBC # BLD: 0 K/UL (ref 0–0.2)
OTHER OBSERVATIONS,UCOM: ABNORMAL
PH UR STRIP: 5.5 [PH] (ref 5–9)
PLATELET # BLD AUTO: 120 K/UL (ref 150–450)
PMV BLD AUTO: 11.9 FL (ref 9.4–12.3)
POTASSIUM SERPL-SCNC: 4.4 MMOL/L (ref 3.5–5.1)
PROT SERPL-MCNC: 7.4 G/DL (ref 6.3–8.2)
PROT UR STRIP-MCNC: NEGATIVE MG/DL
RBC # BLD AUTO: 3.02 M/UL (ref 4.05–5.2)
RBC #/AREA URNS HPF: ABNORMAL /HPF
SODIUM SERPL-SCNC: 132 MMOL/L (ref 136–145)
SP GR UR REFRACTOMETRY: 1.02 (ref 1–1.02)
UROBILINOGEN UR QL STRIP.AUTO: 0.2 EU/DL (ref 0.2–1)
WBC # BLD AUTO: 4 K/UL (ref 4.3–11.1)
WBC URNS QL MICRO: ABNORMAL /HPF

## 2019-01-27 PROCEDURE — 96366 THER/PROPH/DIAG IV INF ADDON: CPT

## 2019-01-27 PROCEDURE — 83735 ASSAY OF MAGNESIUM: CPT

## 2019-01-27 PROCEDURE — 80053 COMPREHEN METABOLIC PANEL: CPT

## 2019-01-27 PROCEDURE — 36415 COLL VENOUS BLD VENIPUNCTURE: CPT

## 2019-01-27 PROCEDURE — 83036 HEMOGLOBIN GLYCOSYLATED A1C: CPT

## 2019-01-27 PROCEDURE — 74011636637 HC RX REV CODE- 636/637: Performed by: NURSE PRACTITIONER

## 2019-01-27 PROCEDURE — 99218 HC RM OBSERVATION: CPT

## 2019-01-27 PROCEDURE — 85027 COMPLETE CBC AUTOMATED: CPT

## 2019-01-27 PROCEDURE — 74011250637 HC RX REV CODE- 250/637: Performed by: INTERNAL MEDICINE

## 2019-01-27 PROCEDURE — 82962 GLUCOSE BLOOD TEST: CPT

## 2019-01-27 PROCEDURE — 81001 URINALYSIS AUTO W/SCOPE: CPT

## 2019-01-27 RX ORDER — INSULIN LISPRO 100 [IU]/ML
INJECTION, SOLUTION INTRAVENOUS; SUBCUTANEOUS
Status: DISCONTINUED | OUTPATIENT
Start: 2019-01-27 | End: 2019-01-31 | Stop reason: HOSPADM

## 2019-01-27 RX ADMIN — GABAPENTIN 300 MG: 300 CAPSULE ORAL at 17:26

## 2019-01-27 RX ADMIN — PANTOPRAZOLE SODIUM 40 MG: 40 TABLET, DELAYED RELEASE ORAL at 05:48

## 2019-01-27 RX ADMIN — ISOSORBIDE MONONITRATE 30 MG: 30 TABLET, EXTENDED RELEASE ORAL at 08:44

## 2019-01-27 RX ADMIN — CARVEDILOL 12.5 MG: 12.5 TABLET, FILM COATED ORAL at 08:44

## 2019-01-27 RX ADMIN — POTASSIUM CHLORIDE 20 MEQ: 20 TABLET, EXTENDED RELEASE ORAL at 08:43

## 2019-01-27 RX ADMIN — CARVEDILOL 12.5 MG: 12.5 TABLET, FILM COATED ORAL at 17:26

## 2019-01-27 RX ADMIN — GABAPENTIN 300 MG: 300 CAPSULE ORAL at 08:44

## 2019-01-27 RX ADMIN — FUROSEMIDE 20 MG: 20 TABLET ORAL at 08:44

## 2019-01-27 RX ADMIN — GABAPENTIN 300 MG: 300 CAPSULE ORAL at 00:30

## 2019-01-27 RX ADMIN — ASPIRIN 325 MG: 325 TABLET, DELAYED RELEASE ORAL at 08:44

## 2019-01-27 RX ADMIN — INSULIN LISPRO 2 UNITS: 100 INJECTION, SOLUTION INTRAVENOUS; SUBCUTANEOUS at 22:28

## 2019-01-27 RX ADMIN — GABAPENTIN 300 MG: 300 CAPSULE ORAL at 22:28

## 2019-01-27 NOTE — ED NOTES
TRANSFER - OUT REPORT: 
 
Verbal report given to [facility]) on [address] [facility],6Th Floor  being transferred to 6th floor(unit) for routine progression of care Report consisted of patients Situation, Background, Assessment and  
Recommendations(SBAR). Information from the following report(s) SBAR was reviewed with the receiving nurse. Lines:  
Peripheral IV 01/26/19 Left Hand (Active) Site Assessment Clean, dry, & intact 1/26/2019  1:50 PM  
Phlebitis Assessment 0 1/26/2019  1:50 PM  
Infiltration Assessment 0 1/26/2019  1:50 PM  
Dressing Status Clean, dry, & intact 1/26/2019  1:50 PM  
   
Peripheral IV 01/26/19 Left Antecubital (Active) Site Assessment Clean, dry, & intact 1/26/2019  2:23 PM  
Phlebitis Assessment 0 1/26/2019  2:23 PM  
Infiltration Assessment 0 1/26/2019  2:23 PM  
Dressing Status Clean, dry, & intact 1/26/2019  2:23 PM  
  
 
Opportunity for questions and clarification was provided. Patient transported with: 
Patient transport

## 2019-01-27 NOTE — PROGRESS NOTES
Hospitalist Progress Note Subjective:  
Daily Progress Note: 2019 12:19 PM 
 
Patient presented to ER  for AMS with hypoglycemia with BGl of 27. Improved in ER but continued dropping glucose levels. Patient not consistently checking glucose as she is out of strips. Taking amaryl 2 mg only at home. Had E Coli UTI in November of last year. On home hospice :  A1C: 6.1. Glucose climbing. D10 drip discontinued. Glucose 274 one hour after discontinuing drip. Glucose continued to climb throughout the day. Reinstated SSI. ADDITIONAL HISTORY:  CHF, CAD, DM II, Cardiomyopathy, gout, chronic du, CKD, Stroke, PUD, hypertension, lymphedema, morbid obesity, persistent atrial fib, SOBO, DEWAYNE without CPAP, UTI, bedbound, home hospice for CHF Current Facility-Administered Medications Medication Dose Route Frequency  aspirin delayed-release tablet 325 mg  325 mg Oral DAILY  carvedilol (COREG) tablet 12.5 mg  12.5 mg Oral BID WITH MEALS  furosemide (LASIX) tablet 20 mg  20 mg Oral DAILY  gabapentin (NEURONTIN) capsule 300 mg  300 mg Oral TID  isosorbide mononitrate ER (IMDUR) tablet 30 mg  30 mg Oral DAILY  pantoprazole (PROTONIX) tablet 40 mg  40 mg Oral ACB  potassium chloride (K-DUR, KLOR-CON) SR tablet 20 mEq  20 mEq Oral DAILY  dextrose 10% infusion  30 mL/hr IntraVENous CONTINUOUS  
 dextrose 10% infusion  30 mL/hr IntraVENous CONTINUOUS Review of Systems A comprehensive review of systems was negative except for that written in the HPI. Objective:  
 
Visit Vitals /73 Pulse 73 Temp 98.7 °F (37.1 °C) Resp 18 SpO2 100% Temp (24hrs), Av.6 °F (37 °C), Min:98.5 °F (36.9 °C), Max:98.7 °F (37.1 °C) General appearance: Oriented and alert, cooperative, denies pain or need. Eating and drinking normally at present. Head: Normocephalic, without obvious abnormality, atraumatic Eyes: conjunctivae/corneas clear.  PERRL 
 Throat: Lips, mucosa, and tongue normal. Teeth and gums normal 
Neck: supple, symmetrical, trachea midline, no JVD Lungs: clear to auscultation bilaterally Heart: regular rate and rhythm, S1, S2 normal, no murmur, click, rub or gallop Abdomen: soft, non-tender. Bowel sounds normal. No masses,  no organomegaly Extremities: extremities normal, atraumatic, no cyanosis or edema Skin: Skin color, texture, turgor normal. No rashes or lesions Neurologic: Grossly normal 
 
Additional comments: Notes,orders, test results, vitals reviewed Data Review Recent Results (from the past 24 hour(s)) GLUCOSE, POC Collection Time: 01/26/19  2:03 PM  
Result Value Ref Range Glucose (POC) 25 (LL) 65 - 100 mg/dL GLUCOSE, POC Collection Time: 01/26/19  2:15 PM  
Result Value Ref Range Glucose (POC) 96 65 - 100 mg/dL CBC WITH AUTOMATED DIFF Collection Time: 01/26/19  2:21 PM  
Result Value Ref Range WBC 4.1 (L) 4.3 - 11.1 K/uL  
 RBC 3.35 (L) 4.05 - 5.2 M/uL HGB 9.1 (L) 11.7 - 15.4 g/dL HCT 30.2 (L) 35.8 - 46.3 % MCV 90.1 79.6 - 97.8 FL  
 MCH 27.2 26.1 - 32.9 PG  
 MCHC 30.1 (L) 31.4 - 35.0 g/dL  
 RDW 15.6 (H) 11.9 - 14.6 % PLATELET 422 961 - 602 K/uL MPV 11.7 9.4 - 12.3 FL ABSOLUTE NRBC 0.00 0.0 - 0.2 K/uL  
 DF AUTOMATED NEUTROPHILS 76 43 - 78 % LYMPHOCYTES 13 13 - 44 % MONOCYTES 10 4.0 - 12.0 % EOSINOPHILS 1 0.5 - 7.8 % BASOPHILS 1 0.0 - 2.0 % IMMATURE GRANULOCYTES 1 0.0 - 5.0 %  
 ABS. NEUTROPHILS 3.1 1.7 - 8.2 K/UL  
 ABS. LYMPHOCYTES 0.5 0.5 - 4.6 K/UL  
 ABS. MONOCYTES 0.4 0.1 - 1.3 K/UL  
 ABS. EOSINOPHILS 0.0 0.0 - 0.8 K/UL  
 ABS. BASOPHILS 0.0 0.0 - 0.2 K/UL  
 ABS. IMM. GRANS. 0.0 0.0 - 0.5 K/UL METABOLIC PANEL, COMPREHENSIVE Collection Time: 01/26/19  2:21 PM  
Result Value Ref Range Sodium 139 136 - 145 mmol/L Potassium 3.7 3.5 - 5.1 mmol/L Chloride 99 98 - 107 mmol/L  
 CO2 34 (H) 21 - 32 mmol/L  Anion gap 6 (L) 7 - 16 mmol/L  
 Glucose 97 65 - 100 mg/dL BUN 75 (H) 8 - 23 MG/DL Creatinine 1.61 (H) 0.6 - 1.0 MG/DL  
 GFR est AA 39 (L) >60 ml/min/1.73m2 GFR est non-AA 32 (L) >60 ml/min/1.73m2 Calcium 9.8 8.3 - 10.4 MG/DL Bilirubin, total 0.4 0.2 - 1.1 MG/DL  
 ALT (SGPT) 11 (L) 12 - 65 U/L  
 AST (SGOT) 14 (L) 15 - 37 U/L Alk. phosphatase 69 50 - 136 U/L Protein, total 8.1 6.3 - 8.2 g/dL Albumin 3.6 3.2 - 4.6 g/dL Globulin 4.5 (H) 2.3 - 3.5 g/dL A-G Ratio 0.8 (L) 1.2 - 3.5 GLUCOSE, POC Collection Time: 01/26/19  4:20 PM  
Result Value Ref Range Glucose (POC) 58 (L) 65 - 100 mg/dL GLUCOSE, POC Collection Time: 01/26/19  5:05 PM  
Result Value Ref Range Glucose (POC) 169 (H) 65 - 100 mg/dL GLUCOSE, POC Collection Time: 01/26/19  6:54 PM  
Result Value Ref Range Glucose (POC) 33 (LL) 65 - 100 mg/dL GLUCOSE, POC Collection Time: 01/26/19  6:58 PM  
Result Value Ref Range Glucose (POC) 51 (L) 65 - 100 mg/dL GLUCOSE, POC Collection Time: 01/26/19  8:40 PM  
Result Value Ref Range Glucose (POC) 79 65 - 100 mg/dL POC LACTIC ACID Collection Time: 01/26/19  9:39 PM  
Result Value Ref Range Lactic Acid (POC) 0.89 0.5 - 1.9 mmol/L  
GLUCOSE, POC Collection Time: 01/27/19 12:55 AM  
Result Value Ref Range Glucose (POC) 193 (H) 65 - 100 mg/dL HEMOGLOBIN A1C WITH EAG Collection Time: 01/27/19  4:55 AM  
Result Value Ref Range Hemoglobin A1c 6.1 (H) 4.8 - 6.0 % Est. average glucose 128 mg/dL GLUCOSE, POC Collection Time: 01/27/19  6:12 AM  
Result Value Ref Range Glucose (POC) 151 (H) 65 - 100 mg/dL GLUCOSE, POC Collection Time: 01/27/19 10:06 AM  
Result Value Ref Range Glucose (POC) 270 (H) 65 - 100 mg/dL Assessment/Plan:  
Persistent Hypoglycemia D 10 drip initiated on admission:  Discontinued 1/27 pm 
 Holding home amaryl 2 mg NIDDM: A1C 6.1 Holding home amaryl SAMAN on CKD with admission creatinine 1.61, baseline 1.41 
 Gentle fluids Monitor History of CHF 
CAD with Cardiomyopathy History of stroke Hypertension:  Stable on meds Lymphedema Morbid obesity Chronic atrial fib not on anticoags (prior GIB) Unclear when anticoags were discontinued, was taking in September 2018 DEWAYNE without CPAP Bedbound at baseline Home hospice for CHF Care Plan discussed with: Patient and Nurse Signed By: Gerardo Delgado NP   
 January 27, 2019

## 2019-01-27 NOTE — PROGRESS NOTES
01/26/19 2201 Musculoskeletal  
LUE Weakness LLE Swelling;Weakness;Limited movement RUE Weakness RLE Swelling;Weakness;Limited movement Dual Skin Pressure Injury Assessment Dual Skin Pressure Injury Assessment WDL Second Care Provider (Based on [address] [place] [address]) [address] Skin Integumentary Skin Integumentary (WDL) X Skin Color Ecchymosis (comment) Skin Condition/Temp Cool;Dry;Peeling Skin Integrity Excoriation; Lesion (comment); Tear 
(Bilateral ankle puncture/lesions from \"popped varicose veins) Turgor Shiney/hard Hair Growth Sparce Varicosities Present Pt with bilateral ankle puncture/lesions, states they are from \"popped varicose veins\". Excoriation in skin folds. Right abd skin tear located in skin fold. Peeling heels. Bilateral lower extremity edema.

## 2019-01-27 NOTE — PROGRESS NOTES
Verbal order received from Yuliana Pop NP, discontinue q 4 hour blood sugar checks, start ac/hs blood sugar checks, start Humalog SSI regular sensitivity.

## 2019-01-27 NOTE — ED NOTES
POC glucose was 51. MD aware. Patient given whole milk, sabina crackers, and peanut butter. Charge RN notified that sandwich trays needed.

## 2019-01-27 NOTE — H&P
Hospitalist H&P Note Admit Date:  2019  1:45 PM  
Name:  Teresa Avalos Age:  80 y.o. 
:  1935 MRN:  [id number] PCP:  Vickey Brennan MD 
Treatment Team: Primary Nurse: Sandy Sood RN 
CC: ams and low blood glucose HPI:  
80yr old female on hospice for chf, with history of cad, dm, cardiomyopathy. Noted to have ams this morning- staring speech impairment bg in the 20's. Pt has not been consistently checking her bg because she is out of strips. In the er - she improved mentally with glucose but continues to go hyperglycemic so hospitalist asked to admit to observation. 10 systems reviewed and negative except as noted in HPI. Past Medical History:  
Diagnosis Date  Acute gout involving toe of left foot 2017  CAD (coronary artery disease)  \"irregular heart beat\"  Cardiomyopathy ([address] 2009  Chronic indwelling Harper catheter  Chronic kidney disease   
 on HD since 2012  Chronic systolic heart failure ([address] 2007  Colon polyps   CVA (cerebral vascular accident) ([address]  CVA (cerebral vascular accident) ([address] 2016  Deep vein thrombosis (DVT) of lower extremity ([address] 2016  Diabetes ([address] 2002  Diabetes mellitus ([address] 2012  Duodenal ulcer  Duodenal ulcer 2012  Duodenal ulcer 2012  Dyslipidemia 2016  Essential hypertension 10/26/2009  
 Heart failure ([address]  Hypertension 2009  Hypoglycemia, unspecified 12/3/2012  JOÃO (iron deficiency anemia)  Obesity, morbid 2009  Other ill-defined conditions(799.89) 2002 Lymphedema  Persistent atrial fibrillation ([address] 10/26/2009  Pulmonary HTN ([address]  SBO (small bowel obstruction) ([address] 2013  Sleep apnea 2010  
 non-compliant with CPAP  Sleep apnea - noncompliant with CPAP 2012  Type II diabetes mellitus, uncontrolled ([address] 2012  Urinary tract infection 12/3/2012  UTI (lower urinary tract infection) 8/8/2013  UTI (urinary tract infection) 11/23/2012 Past Surgical History:  
Procedure Laterality Date  COLONOSCOPY N/A 6/20/2016 COLONOSCOPY performed by R[facility]ki Melo MD at [facility] ENDOSCOPY  
 HX COLONOSCOPY  2016  
 polyp  HX HYSTERECTOMY  >20 years ago  HX TUBAL LIGATION  >40 years ago Allergies Allergen Reactions  Benazepril Angioedema Tongue swelling  Dynacirc [Isradipine] Angioedema Tongue swelling  Peanut Angioedema Tongue swelling  Morphine Other (comments) Unknown rxn, \"morphine just does not agree with me\" Social History Tobacco Use  Smoking status: Never Smoker  Smokeless tobacco: Never Used Substance Use Topics  Alcohol use: No  
  
Family History Problem Relation Age of Onset  Heart Disease Sister  Diabetes Other Immunization History Administered Date(s) Administered  Influenza Vaccine 10/05/2015  Influenza Vaccine (Quad) PF 09/14/2018  Pneumococcal Conjugate (PCV-13) 06/14/2017  TB Skin Test (PPD) Intradermal 08/19/2013, 09/07/2018 PTA Medications: 
Prior to Admission Medications Prescriptions Last Dose Informant Patient Reported? Taking?  
ascorbic acid, vitamin C, (VITAMIN C) 500 mg tablet   Yes No  
Sig: Take 500 mg by mouth daily. aspirin delayed-release 325 mg tablet   No No  
Sig: Take 1 Tab by mouth daily. carvedilol (COREG) 12.5 mg tablet   No No  
Sig: TAKE 1 TABLET BY MOUTH TWO (2) TIMES DAILY (WITH MEALS). cholecalciferol (VITAMIN D3) 1,000 unit cap   No No  
Sig: Take 1 Cap by mouth daily. furosemide (LASIX) 20 mg tablet   No No  
Sig: Take 1 Tab by mouth daily. gabapentin (NEURONTIN) 300 mg capsule   No No  
Sig: Take 1 Cap by mouth three (3) times daily. glimepiride (AMARYL) 2 mg tablet   No No  
Sig: Take 1 Tab by mouth Daily (before breakfast).   
isosorbide mononitrate ER (IMDUR) 30 mg tablet   No No  
 Sig: TAKE 1 TABLET BY MOUTH DAILY. pantoprazole (PROTONIX) 40 mg tablet   No No  
Sig: TAKE 1 TABLET BY MOUTH DAILY (BEFORE BREAKFAST). potassium chloride (K-DUR, KLOR-CON) 20 mEq tablet   No No  
Sig: Take 1 Tab by mouth daily. Facility-Administered Medications: None Objective:  
 
Patient Vitals for the past 24 hrs: 
 Pulse Resp BP SpO2  
01/26/19 1843 73   99 % 01/26/19 1839   147/69   
01/26/19 1503 72  146/70 98 % 01/26/19 1355  16 150/62  Oxygen Therapy O2 Sat (%): 99 % (01/26/19 1843) Pulse via Oximetry: 73 beats per minute (01/26/19 1843) No intake or output data in the 24 hours ending 01/26/19 2141 Physical Exam: 
General:    Well nourished. Alert. Eyes:   Normal sclera. Extraocular movements intact. ENT:  Normocephalic, atraumatic. Moist mucous membranes CV:   RRR. No m/r/g. Peripheral pulses present. Capillary refill normal 
Lungs:  CTAB. No wheezing, rhonchi, or rales. Abdomen: Soft, nontender, nondistended. Bowel sounds normal.  
Extremities: Warm and dry. No cyanosis or edema. Neurologic: CN II-XII grossly intact. Sensation intact. Skin:     No rashes or jaundice. Normal coloration Psych:  Normal mood and affect. I reviewed the labs, imaging, EKGs, telemetry, and other studies done this admission. Data Review:  
Recent Results (from the past 24 hour(s)) GLUCOSE, POC Collection Time: 01/26/19  2:03 PM  
Result Value Ref Range Glucose (POC) 25 (LL) 65 - 100 mg/dL GLUCOSE, POC Collection Time: 01/26/19  2:15 PM  
Result Value Ref Range Glucose (POC) 96 65 - 100 mg/dL CBC WITH AUTOMATED DIFF Collection Time: 01/26/19  2:21 PM  
Result Value Ref Range WBC 4.1 (L) 4.3 - 11.1 K/uL  
 RBC 3.35 (L) 4.05 - 5.2 M/uL HGB 9.1 (L) 11.7 - 15.4 g/dL HCT 30.2 (L) 35.8 - 46.3 % MCV 90.1 79.6 - 97.8 FL  
 MCH 27.2 26.1 - 32.9 PG  
 MCHC 30.1 (L) 31.4 - 35.0 g/dL  
 RDW 15.6 (H) 11.9 - 14.6 % PLATELET 576 940 - 356 K/uL MPV 11.7 9.4 - 12.3 FL ABSOLUTE NRBC 0.00 0.0 - 0.2 K/uL  
 DF AUTOMATED NEUTROPHILS 76 43 - 78 % LYMPHOCYTES 13 13 - 44 % MONOCYTES 10 4.0 - 12.0 % EOSINOPHILS 1 0.5 - 7.8 % BASOPHILS 1 0.0 - 2.0 % IMMATURE GRANULOCYTES 1 0.0 - 5.0 %  
 ABS. NEUTROPHILS 3.1 1.7 - 8.2 K/UL  
 ABS. LYMPHOCYTES 0.5 0.5 - 4.6 K/UL  
 ABS. MONOCYTES 0.4 0.1 - 1.3 K/UL  
 ABS. EOSINOPHILS 0.0 0.0 - 0.8 K/UL  
 ABS. BASOPHILS 0.0 0.0 - 0.2 K/UL  
 ABS. IMM. GRANS. 0.0 0.0 - 0.5 K/UL METABOLIC PANEL, COMPREHENSIVE Collection Time: 01/26/19  2:21 PM  
Result Value Ref Range Sodium 139 136 - 145 mmol/L Potassium 3.7 3.5 - 5.1 mmol/L Chloride 99 98 - 107 mmol/L  
 CO2 34 (H) 21 - 32 mmol/L Anion gap 6 (L) 7 - 16 mmol/L Glucose 97 65 - 100 mg/dL BUN 75 (H) 8 - 23 MG/DL Creatinine 1.61 (H) 0.6 - 1.0 MG/DL  
 GFR est AA 39 (L) >60 ml/min/1.73m2 GFR est non-AA 32 (L) >60 ml/min/1.73m2 Calcium 9.8 8.3 - 10.4 MG/DL Bilirubin, total 0.4 0.2 - 1.1 MG/DL  
 ALT (SGPT) 11 (L) 12 - 65 U/L  
 AST (SGOT) 14 (L) 15 - 37 U/L Alk. phosphatase 69 50 - 136 U/L Protein, total 8.1 6.3 - 8.2 g/dL Albumin 3.6 3.2 - 4.6 g/dL Globulin 4.5 (H) 2.3 - 3.5 g/dL A-G Ratio 0.8 (L) 1.2 - 3.5 GLUCOSE, POC Collection Time: 01/26/19  4:20 PM  
Result Value Ref Range Glucose (POC) 58 (L) 65 - 100 mg/dL GLUCOSE, POC Collection Time: 01/26/19  5:05 PM  
Result Value Ref Range Glucose (POC) 169 (H) 65 - 100 mg/dL GLUCOSE, POC Collection Time: 01/26/19  6:54 PM  
Result Value Ref Range Glucose (POC) 33 (LL) 65 - 100 mg/dL GLUCOSE, POC Collection Time: 01/26/19  6:58 PM  
Result Value Ref Range Glucose (POC) 51 (L) 65 - 100 mg/dL GLUCOSE, POC Collection Time: 01/26/19  8:40 PM  
Result Value Ref Range Glucose (POC) 79 65 - 100 mg/dL All Micro Results None Other Studies: No results found. Assessment and Plan: Hospital Problems as of 1/26/2019 Date Reviewed: 10/22/2018 Codes Class Noted - Resolved POA Hypoglycemia ICD-10-CM: E16.2 ICD-9-CM: 251.2  1/26/2019 - Present Unknown SAMAN (acute kidney injury) ([facility] ICD-10-CM: N17.9 ICD-9-CM: 584.9  1/26/2019 - Present Yes PLAN: 
· Hypoglycemia- hold antihyperglycemics. Low dose d10w in light of chf - will re-start her diet as she is requesting to eat; check her a1c 
· saman- patient mildly dehydrated so amaryl may be hanging around longer in her system. .. May consider glipizide as this has less hypoglycemia in renal impairment  or just using insulin on dc · Continue appropriate home meds · Further workup and mgt based on her clinical course · Pt is bed bound at baseline DVT ppx:  heparin Anticipated DC needs:  Script for glucometer strips Code status:  Full Estimated LOS:  24- 48 hours- discharge back home with home hospice Risk:  high Signed: 
Tesha Leyva MD

## 2019-01-27 NOTE — PROGRESS NOTES
Pt has been alert and oriented. Blood glucose ordered q 4 hours. Hourly rounds made and all needs met.

## 2019-01-27 NOTE — PROGRESS NOTES
TRANSFER - IN REPORT: 
 
Verbal report received from [facility]) on [address] [place] [address],[address]  being received from ED(unit) for routine progression of care Report consisted of patients Situation, Background, Assessment and  
Recommendations(SBAR). Information from the following report(s) SBAR, Kardex and ED Summary was reviewed with the receiving nurse. Opportunity for questions and clarification was provided. Assessment completed upon patients arrival to unit and care assumed.

## 2019-01-27 NOTE — PROGRESS NOTES
Patient visited by spiritual care volunteer Nelson Sepulveda. Patient was alert and responsive [place] Avila in her life Has good family support Had prayer with patient Ziyad Reece, staff

## 2019-01-28 LAB
ANION GAP SERPL CALC-SCNC: 6 MMOL/L (ref 7–16)
BUN SERPL-MCNC: 81 MG/DL (ref 8–23)
CALCIUM SERPL-MCNC: 10 MG/DL (ref 8.3–10.4)
CHLORIDE SERPL-SCNC: 95 MMOL/L (ref 98–107)
CO2 SERPL-SCNC: 30 MMOL/L (ref 21–32)
CREAT SERPL-MCNC: 1.88 MG/DL (ref 0.6–1)
ERYTHROCYTE [DISTWIDTH] IN BLOOD BY AUTOMATED COUNT: 15.2 % (ref 11.9–14.6)
GLUCOSE BLD STRIP.AUTO-MCNC: 148 MG/DL (ref 65–100)
GLUCOSE BLD STRIP.AUTO-MCNC: 177 MG/DL (ref 65–100)
GLUCOSE BLD STRIP.AUTO-MCNC: 189 MG/DL (ref 65–100)
GLUCOSE BLD STRIP.AUTO-MCNC: 207 MG/DL (ref 65–100)
GLUCOSE SERPL-MCNC: 195 MG/DL (ref 65–100)
HCT VFR BLD AUTO: 28.1 % (ref 35.8–46.3)
HGB BLD-MCNC: 8.4 G/DL (ref 11.7–15.4)
MAGNESIUM SERPL-MCNC: 2 MG/DL (ref 1.8–2.4)
MCH RBC QN AUTO: 27.2 PG (ref 26.1–32.9)
MCHC RBC AUTO-ENTMCNC: 29.9 G/DL (ref 31.4–35)
MCV RBC AUTO: 90.9 FL (ref 79.6–97.8)
NRBC # BLD: 0 K/UL (ref 0–0.2)
PLATELET # BLD AUTO: 120 K/UL (ref 150–450)
PMV BLD AUTO: 12.3 FL (ref 9.4–12.3)
POTASSIUM SERPL-SCNC: 5.7 MMOL/L (ref 3.5–5.1)
RBC # BLD AUTO: 3.09 M/UL (ref 4.05–5.2)
SODIUM SERPL-SCNC: 131 MMOL/L (ref 136–145)
WBC # BLD AUTO: 5 K/UL (ref 4.3–11.1)

## 2019-01-28 PROCEDURE — 74011250637 HC RX REV CODE- 250/637: Performed by: INTERNAL MEDICINE

## 2019-01-28 PROCEDURE — 85027 COMPLETE CBC AUTOMATED: CPT

## 2019-01-28 PROCEDURE — 77010033678 HC OXYGEN DAILY

## 2019-01-28 PROCEDURE — 74011636637 HC RX REV CODE- 636/637: Performed by: NURSE PRACTITIONER

## 2019-01-28 PROCEDURE — 99218 HC RM OBSERVATION: CPT

## 2019-01-28 PROCEDURE — 77030020263 HC SOL INJ SOD CL0.9% LFCR 1000ML

## 2019-01-28 PROCEDURE — 94760 N-INVAS EAR/PLS OXIMETRY 1: CPT

## 2019-01-28 PROCEDURE — 96361 HYDRATE IV INFUSION ADD-ON: CPT

## 2019-01-28 PROCEDURE — 82962 GLUCOSE BLOOD TEST: CPT

## 2019-01-28 PROCEDURE — 80048 BASIC METABOLIC PNL TOTAL CA: CPT

## 2019-01-28 PROCEDURE — 74011250636 HC RX REV CODE- 250/636: Performed by: NURSE PRACTITIONER

## 2019-01-28 PROCEDURE — 36415 COLL VENOUS BLD VENIPUNCTURE: CPT

## 2019-01-28 PROCEDURE — 83735 ASSAY OF MAGNESIUM: CPT

## 2019-01-28 RX ORDER — SODIUM CHLORIDE 9 MG/ML
75 INJECTION, SOLUTION INTRAVENOUS CONTINUOUS
Status: DISCONTINUED | OUTPATIENT
Start: 2019-01-28 | End: 2019-01-29

## 2019-01-28 RX ADMIN — CARVEDILOL 12.5 MG: 12.5 TABLET, FILM COATED ORAL at 10:12

## 2019-01-28 RX ADMIN — PANTOPRAZOLE SODIUM 40 MG: 40 TABLET, DELAYED RELEASE ORAL at 06:08

## 2019-01-28 RX ADMIN — INSULIN LISPRO 4 UNITS: 100 INJECTION, SOLUTION INTRAVENOUS; SUBCUTANEOUS at 12:46

## 2019-01-28 RX ADMIN — GABAPENTIN 300 MG: 300 CAPSULE ORAL at 10:12

## 2019-01-28 RX ADMIN — ISOSORBIDE MONONITRATE 30 MG: 30 TABLET, EXTENDED RELEASE ORAL at 10:12

## 2019-01-28 RX ADMIN — GABAPENTIN 300 MG: 300 CAPSULE ORAL at 22:37

## 2019-01-28 RX ADMIN — INSULIN LISPRO 2 UNITS: 100 INJECTION, SOLUTION INTRAVENOUS; SUBCUTANEOUS at 22:37

## 2019-01-28 RX ADMIN — SODIUM CHLORIDE 75 ML/HR: 900 INJECTION, SOLUTION INTRAVENOUS at 23:38

## 2019-01-28 RX ADMIN — POTASSIUM CHLORIDE 20 MEQ: 20 TABLET, EXTENDED RELEASE ORAL at 10:11

## 2019-01-28 RX ADMIN — ASPIRIN 325 MG: 325 TABLET, DELAYED RELEASE ORAL at 10:12

## 2019-01-28 RX ADMIN — FUROSEMIDE 20 MG: 20 TABLET ORAL at 10:12

## 2019-01-28 RX ADMIN — CARVEDILOL 12.5 MG: 12.5 TABLET, FILM COATED ORAL at 18:07

## 2019-01-28 RX ADMIN — GABAPENTIN 300 MG: 300 CAPSULE ORAL at 18:07

## 2019-01-28 NOTE — PROGRESS NOTES
Chart screened by  for discharge planning. No needs identified at this time. Please consult  if any new issues arise. Patient is current with [facility] hospice and discharge plan is for patient to return home with daughter with hospice services. CM will continue to follow. Care Management Interventions PCP Verified by CM: Yes Mode of Transport at Discharge: Other (see comment) Transition of Care Consult (CM Consult): Discharge Planning, Home Hospice [facility]: No 
Reason Outside [place]: Patient already serviced by other home care/hospice agency Discharge Durable Medical Equipment: No 
Physical Therapy Consult: No 
Occupational Therapy Consult: No 
Current Support Network: Own Home Confirm Follow Up Transport: Family Plan discussed with Pt/Family/Caregiver: Yes Freedom of Choice Offered: Yes Discharge Location Discharge Placement: Home with hospice

## 2019-01-28 NOTE — PHYSICIAN ADVISORY
Letter of Determination:  Outpatient Status receiving Observation Services This case was reviewed, and I concur with Outpatient status receiving Observation services. This determination may change depending on further medical information, condition changes of the patient, or treatment requirements. Fabrizio Duke MD, AYDEE, Physician Advisor [address] [place] [address].

## 2019-01-28 NOTE — PROGRESS NOTES
Interdisciplinary Rounds completed 01/28/19. Nursing, Case Management, Physician and PT present. Plan of care reviewed and updated.  
 
Probable d/c in am.

## 2019-01-28 NOTE — PROGRESS NOTES
Hospitalist Progress Note Subjective:  
Daily Progress Note: 1/28/2019 2:05 PM 
 
Patient presented to ER 1/26 for AMS with hypoglycemia with BGl of 27. Improved in ER but continued dropping glucose levels. Patient not consistently checking glucose as she is out of strips. Taking amaryl 2 mg only at home. Had E Coli UTI in November of last year. On home hospice for CHF.  
 1/27:  A1C: 6.1. Glucose climbing. D10 drip discontinued. Glucose 274 one hour after discontinuing drip. Glucose continued to climb throughout the day despite discontinuation of drip. Reinstated SSI.  
 
1/28:  Glucose 147- 207. Continuing SSI. Found with UTI, urine culture ordered, then rocephin begun. States she is feeling better. K+ 5.7. K+ supplements discontinued. Denies UTI symptoms. Records review indicate an E Coli UTI in late November of 2018. At that time she had clots in her urine and Hgb dropped to 6.7 necessitating a transfusion. Currently Hgb has dropped to 8.2 from 9.1 on admission. Currently she only has 3-5 RBCs in urine. No fever (99.2)  or leukocytosis.  
  
ADDITIONAL HISTORY:  CHF, CAD, DM II, Cardiomyopathy, gout, chronic du, CKD, Stroke, PUD, hypertension, lymphedema, morbid obesity, persistent atrial fib, SOBO, DEWAYNE without CPAP, UTI, bedbound, home hospice for CHF Current Facility-Administered Medications Medication Dose Route Frequency  insulin lispro (HUMALOG) injection   SubCUTAneous AC&HS  aspirin delayed-release tablet 325 mg  325 mg Oral DAILY  carvedilol (COREG) tablet 12.5 mg  12.5 mg Oral BID WITH MEALS  furosemide (LASIX) tablet 20 mg  20 mg Oral DAILY  gabapentin (NEURONTIN) capsule 300 mg  300 mg Oral TID  isosorbide mononitrate ER (IMDUR) tablet 30 mg  30 mg Oral DAILY  pantoprazole (PROTONIX) tablet 40 mg  40 mg Oral ACB  potassium chloride (K-DUR, KLOR-CON) SR tablet 20 mEq  20 mEq Oral DAILY Review of Systems A comprehensive review of systems was negative except for that written in the HPI. Objective:  
 
Visit Vitals /74 (BP 1 Location: Right arm, BP Patient Position: At rest) Pulse 78 Temp 98.1 °F (36.7 °C) Resp 18 Wt 106.3 kg (234 lb 4.8 oz) SpO2 100% BMI 37.82 kg/m² O2 Flow Rate (L/min): 1 l/min(3 decreased to 1) O2 Device: Nasal cannula Temp (24hrs), Av.5 °F (36.9 °C), Min:97.3 °F (36.3 °C), Max:99.2 °F (37.3 °C) No intake/output data recorded.  1901 -  0700 In: 841 [P.O.:480; I.V.:184] Out: - General appearance: Oriented and alert, cooperative, denies pain or need. Eating and drinking normally at present. Head: Normocephalic, without obvious abnormality, atraumatic Eyes: conjunctivae/corneas clear. PERRL Throat: Lips, mucosa, and tongue normal. Teeth and gums normal 
Neck: supple, symmetrical, trachea midline, no JVD Lungs: clear to auscultation bilaterally Heart: regular rate and rhythm, S1, S2 normal, no murmur, click, rub or gallop Abdomen: soft, non-tender. Bowel sounds normal. No masses,  no organomegaly Extremities: extremities normal, atraumatic, no cyanosis or edema Skin: Skin color, texture, turgor normal. No rashes or lesions Neurologic: Grossly normal 
  
Additional comments: Notes,orders, test results, vitals reviewed Data Review Recent Results (from the past 24 hour(s)) GLUCOSE, POC Collection Time: 19  3:09 PM  
Result Value Ref Range Glucose (POC) 274 (H) 65 - 100 mg/dL METABOLIC PANEL, COMPREHENSIVE Collection Time: 19  3:11 PM  
Result Value Ref Range Sodium 132 (L) 136 - 145 mmol/L Potassium 4.4 3.5 - 5.1 mmol/L Chloride 95 (L) 98 - 107 mmol/L  
 CO2 30 21 - 32 mmol/L Anion gap 7 7 - 16 mmol/L Glucose 256 (H) 65 - 100 mg/dL BUN 69 (H) 8 - 23 MG/DL Creatinine 1.67 (H) 0.6 - 1.0 MG/DL  
 GFR est AA 38 (L) >60 ml/min/1.73m2 GFR est non-AA 31 (L) >60 ml/min/1.73m2 Calcium 9.4 8.3 - 10.4 MG/DL Bilirubin, total 0.4 0.2 - 1.1 MG/DL  
 ALT (SGPT) 10 (L) 12 - 65 U/L  
 AST (SGOT) 9 (L) 15 - 37 U/L Alk. phosphatase 66 50 - 136 U/L Protein, total 7.4 6.3 - 8.2 g/dL Albumin 3.4 3.2 - 4.6 g/dL Globulin 4.0 (H) 2.3 - 3.5 g/dL A-G Ratio 0.9 (L) 1.2 - 3.5 MAGNESIUM Collection Time: 01/27/19  3:11 PM  
Result Value Ref Range Magnesium 1.9 1.8 - 2.4 mg/dL CBC W/O DIFF Collection Time: 01/27/19  3:11 PM  
Result Value Ref Range WBC 4.0 (L) 4.3 - 11.1 K/uL  
 RBC 3.02 (L) 4.05 - 5.2 M/uL HGB 8.2 (L) 11.7 - 15.4 g/dL HCT 27.4 (L) 35.8 - 46.3 % MCV 90.7 79.6 - 97.8 FL  
 MCH 27.2 26.1 - 32.9 PG  
 MCHC 29.9 (L) 31.4 - 35.0 g/dL  
 RDW 15.3 (H) 11.9 - 14.6 % PLATELET 397 (L) 027 - 450 K/uL MPV 11.9 9.4 - 12.3 FL ABSOLUTE NRBC 0.00 0.0 - 0.2 K/uL GLUCOSE, POC Collection Time: 01/27/19  6:02 PM  
Result Value Ref Range Glucose (POC) 217 (H) 65 - 100 mg/dL GLUCOSE, POC Collection Time: 01/27/19  8:31 PM  
Result Value Ref Range Glucose (POC) 172 (H) 65 - 100 mg/dL GLUCOSE, POC Collection Time: 01/28/19  7:50 AM  
Result Value Ref Range Glucose (POC) 177 (H) 65 - 100 mg/dL GLUCOSE, POC Collection Time: 01/28/19 11:12 AM  
Result Value Ref Range Glucose (POC) 207 (H) 65 - 100 mg/dL CBC W/O DIFF Collection Time: 01/28/19 12:25 PM  
Result Value Ref Range WBC 5.0 4.3 - 11.1 K/uL  
 RBC 3.09 (L) 4.05 - 5.2 M/uL HGB 8.4 (L) 11.7 - 15.4 g/dL HCT 28.1 (L) 35.8 - 46.3 % MCV 90.9 79.6 - 97.8 FL  
 MCH 27.2 26.1 - 32.9 PG  
 MCHC 29.9 (L) 31.4 - 35.0 g/dL  
 RDW 15.2 (H) 11.9 - 14.6 % PLATELET 390 (L) 563 - 450 K/uL MPV 12.3 9.4 - 12.3 FL ABSOLUTE NRBC 0.00 0.0 - 0.2 K/uL METABOLIC PANEL, BASIC Collection Time: 01/28/19 12:25 PM  
Result Value Ref Range Sodium 131 (L) 136 - 145 mmol/L Potassium 5.7 (H) 3.5 - 5.1 mmol/L  Chloride 95 (L) 98 - 107 mmol/L  
 CO2 30 21 - 32 mmol/L  
 Anion gap 6 (L) 7 - 16 mmol/L Glucose 195 (H) 65 - 100 mg/dL BUN 81 (H) 8 - 23 MG/DL Creatinine 1.88 (H) 0.6 - 1.0 MG/DL  
 GFR est AA 33 (L) >60 ml/min/1.73m2 GFR est non-AA 27 (L) >60 ml/min/1.73m2 Calcium 10.0 8.3 - 10.4 MG/DL MAGNESIUM Collection Time: 01/28/19 12:25 PM  
Result Value Ref Range Magnesium 2.0 1.8 - 2.4 mg/dL Assessment/Plan:  
Persistent Hypoglycemia D 10 drip initiated on admission:  Discontinued 1/27 pm 
            Holding home amaryl 2 mg NIDDM: A1C 6.1 Holding home amaryl SAMAN on CKD with admission creatinine 1.61, baseline 1.41 Gentle fluids Monitor Acute UTI Culture pending Begin rocephin after culture has been collected. Hyponatremia Normal saline at 75 ml/hr x 24 hours only Monitor for fluid overload History of CHF 
CAD with Cardiomyopathy History of stroke Hypertension:  Stable on meds Lymphedema Morbid obesity Chronic atrial fib not on anticoags (prior GIB) Unclear when anticoags were discontinued, was taking in September 2018 DEWAYNE without CPAP Bedbound at baseline Home hospice for CHF Care Plan discussed with: Patient and Nurse Signed By: Ludmila Delgadillo NP   
 January 28, 2019

## 2019-01-28 NOTE — PROGRESS NOTES
In accordance with Medicare guidelines, a copy of the Medicare Outpatient Observation Notice was provided to the patient. Oral explanation was provided and all questions answered. This MOON document was signed by patient & placed in the medical record under media tab. Copy provided to patient.  notified.

## 2019-01-29 LAB
ANION GAP SERPL CALC-SCNC: 8 MMOL/L (ref 7–16)
BASOPHILS # BLD: 0 K/UL (ref 0–0.2)
BASOPHILS NFR BLD: 0 % (ref 0–2)
BNP SERPL-MCNC: 93 PG/ML
BUN SERPL-MCNC: 87 MG/DL (ref 8–23)
CALCIUM SERPL-MCNC: 9.9 MG/DL (ref 8.3–10.4)
CHLORIDE SERPL-SCNC: 97 MMOL/L (ref 98–107)
CO2 SERPL-SCNC: 29 MMOL/L (ref 21–32)
CREAT SERPL-MCNC: 1.89 MG/DL (ref 0.6–1)
DIFFERENTIAL METHOD BLD: ABNORMAL
EOSINOPHIL # BLD: 0.1 K/UL (ref 0–0.8)
EOSINOPHIL NFR BLD: 2 % (ref 0.5–7.8)
ERYTHROCYTE [DISTWIDTH] IN BLOOD BY AUTOMATED COUNT: 15.3 % (ref 11.9–14.6)
GLUCOSE BLD STRIP.AUTO-MCNC: 112 MG/DL (ref 65–100)
GLUCOSE BLD STRIP.AUTO-MCNC: 141 MG/DL (ref 65–100)
GLUCOSE BLD STRIP.AUTO-MCNC: 160 MG/DL (ref 65–100)
GLUCOSE BLD STRIP.AUTO-MCNC: 214 MG/DL (ref 65–100)
GLUCOSE SERPL-MCNC: 89 MG/DL (ref 65–100)
HCT VFR BLD AUTO: 24.7 % (ref 35.8–46.3)
HCT VFR BLD AUTO: 25.8 % (ref 35.8–46.3)
HEMOCCULT STL QL: NEGATIVE
HGB BLD-MCNC: 7.5 G/DL (ref 11.7–15.4)
HGB BLD-MCNC: 7.8 G/DL (ref 11.7–15.4)
IMM GRANULOCYTES # BLD AUTO: 0 K/UL (ref 0–0.5)
IMM GRANULOCYTES NFR BLD AUTO: 0 % (ref 0–5)
LYMPHOCYTES # BLD: 0.6 K/UL (ref 0.5–4.6)
LYMPHOCYTES NFR BLD: 15 % (ref 13–44)
MAGNESIUM SERPL-MCNC: 2.1 MG/DL (ref 1.8–2.4)
MCH RBC QN AUTO: 27.4 PG (ref 26.1–32.9)
MCHC RBC AUTO-ENTMCNC: 30.4 G/DL (ref 31.4–35)
MCV RBC AUTO: 90.1 FL (ref 79.6–97.8)
MONOCYTES # BLD: 0.5 K/UL (ref 0.1–1.3)
MONOCYTES NFR BLD: 14 % (ref 4–12)
NEUTS SEG # BLD: 2.7 K/UL (ref 1.7–8.2)
NEUTS SEG NFR BLD: 69 % (ref 43–78)
NRBC # BLD: 0 K/UL (ref 0–0.2)
PLATELET # BLD AUTO: 112 K/UL (ref 150–450)
PMV BLD AUTO: 12.1 FL (ref 9.4–12.3)
POTASSIUM SERPL-SCNC: 4.5 MMOL/L (ref 3.5–5.1)
RBC # BLD AUTO: 2.74 M/UL (ref 4.05–5.2)
SODIUM SERPL-SCNC: 134 MMOL/L (ref 136–145)
WBC # BLD AUTO: 3.9 K/UL (ref 4.3–11.1)

## 2019-01-29 PROCEDURE — 51798 US URINE CAPACITY MEASURE: CPT

## 2019-01-29 PROCEDURE — 96367 TX/PROPH/DG ADDL SEQ IV INF: CPT

## 2019-01-29 PROCEDURE — 74011250637 HC RX REV CODE- 250/637: Performed by: NURSE PRACTITIONER

## 2019-01-29 PROCEDURE — 65270000029 HC RM PRIVATE

## 2019-01-29 PROCEDURE — 83880 ASSAY OF NATRIURETIC PEPTIDE: CPT

## 2019-01-29 PROCEDURE — 74011250636 HC RX REV CODE- 250/636: Performed by: NURSE PRACTITIONER

## 2019-01-29 PROCEDURE — 83735 ASSAY OF MAGNESIUM: CPT

## 2019-01-29 PROCEDURE — 85025 COMPLETE CBC W/AUTO DIFF WBC: CPT

## 2019-01-29 PROCEDURE — 36415 COLL VENOUS BLD VENIPUNCTURE: CPT

## 2019-01-29 PROCEDURE — 74011000258 HC RX REV CODE- 258: Performed by: NURSE PRACTITIONER

## 2019-01-29 PROCEDURE — 82962 GLUCOSE BLOOD TEST: CPT

## 2019-01-29 PROCEDURE — 74011636637 HC RX REV CODE- 636/637: Performed by: NURSE PRACTITIONER

## 2019-01-29 PROCEDURE — 96361 HYDRATE IV INFUSION ADD-ON: CPT

## 2019-01-29 PROCEDURE — 77030020263 HC SOL INJ SOD CL0.9% LFCR 1000ML

## 2019-01-29 PROCEDURE — 77030034849

## 2019-01-29 PROCEDURE — 82272 OCCULT BLD FECES 1-3 TESTS: CPT

## 2019-01-29 PROCEDURE — 85018 HEMOGLOBIN: CPT

## 2019-01-29 PROCEDURE — 80048 BASIC METABOLIC PNL TOTAL CA: CPT

## 2019-01-29 PROCEDURE — 87086 URINE CULTURE/COLONY COUNT: CPT

## 2019-01-29 PROCEDURE — 65390000012 HC CONDITION CODE 44 OBSERVATION

## 2019-01-29 PROCEDURE — 74011250637 HC RX REV CODE- 250/637: Performed by: INTERNAL MEDICINE

## 2019-01-29 PROCEDURE — 99218 HC RM OBSERVATION: CPT

## 2019-01-29 RX ORDER — PANTOPRAZOLE SODIUM 40 MG/1
40 TABLET, DELAYED RELEASE ORAL
Status: DISCONTINUED | OUTPATIENT
Start: 2019-01-29 | End: 2019-01-30

## 2019-01-29 RX ADMIN — INSULIN LISPRO 2 UNITS: 100 INJECTION, SOLUTION INTRAVENOUS; SUBCUTANEOUS at 22:46

## 2019-01-29 RX ADMIN — GABAPENTIN 300 MG: 300 CAPSULE ORAL at 08:42

## 2019-01-29 RX ADMIN — SODIUM CHLORIDE 75 ML/HR: 900 INJECTION, SOLUTION INTRAVENOUS at 17:08

## 2019-01-29 RX ADMIN — PANTOPRAZOLE SODIUM 40 MG: 40 TABLET, DELAYED RELEASE ORAL at 17:09

## 2019-01-29 RX ADMIN — CEFTRIAXONE SODIUM 1 G: 1 INJECTION, POWDER, FOR SOLUTION INTRAMUSCULAR; INTRAVENOUS at 08:43

## 2019-01-29 RX ADMIN — GABAPENTIN 300 MG: 300 CAPSULE ORAL at 22:46

## 2019-01-29 RX ADMIN — INSULIN LISPRO 4 UNITS: 100 INJECTION, SOLUTION INTRAVENOUS; SUBCUTANEOUS at 17:10

## 2019-01-29 RX ADMIN — GABAPENTIN 300 MG: 300 CAPSULE ORAL at 17:09

## 2019-01-29 RX ADMIN — FUROSEMIDE 20 MG: 20 TABLET ORAL at 08:42

## 2019-01-29 RX ADMIN — ASPIRIN 325 MG: 325 TABLET, DELAYED RELEASE ORAL at 08:42

## 2019-01-29 RX ADMIN — PANTOPRAZOLE SODIUM 40 MG: 40 TABLET, DELAYED RELEASE ORAL at 06:32

## 2019-01-29 RX ADMIN — CARVEDILOL 12.5 MG: 12.5 TABLET, FILM COATED ORAL at 17:09

## 2019-01-29 RX ADMIN — CARVEDILOL 12.5 MG: 12.5 TABLET, FILM COATED ORAL at 08:42

## 2019-01-29 RX ADMIN — ISOSORBIDE MONONITRATE 30 MG: 30 TABLET, EXTENDED RELEASE ORAL at 08:42

## 2019-01-29 NOTE — PROGRESS NOTES
Interdisciplinary Rounds completed 01/29/19. Nursing, Case Management, Physician and PT present. Plan of care reviewed and updated. Now being treated for UTI

## 2019-01-29 NOTE — PROGRESS NOTES
Hourly rounds complete this shift, no new complaints at this time, Urine culture sent obtained via straight cath order, drained 900 ml of clear yellow urine. bed in low, locked position, call light and bedside table within reach,  all needs met. Will continue to monitor Report to day shift nurse.

## 2019-01-29 NOTE — PROGRESS NOTES
Hospitalist Progress Note Subjective:  
Daily Progress Note: 1/29/2019 12:22 PM and 2100 Patient presented to ER 1/26 for AMS with hypoglycemia with BGl of 27.  Improved in ER but continued dropping glucose levels.  Patient not consistently checking glucose as she is out of strips. Taking amaryl 2 mg only at home. Had E Coli UTI in November of last year. On home hospice for CHF.  
 1/27:  A1C: 6.1.  Glucose climbing.  D10 drip discontinued.  Glucose 274 one hour after discontinuing drip.  Glucose continued to climb throughout the day despite discontinuation of drip.  Reinstated SSI.  
1/28:  Glucose 147- 207. Continuing SSI. Found with UTI, urine culture ordered, then rocephin begun. States she is feeling better. K+ 5.7. K+ supplements discontinued. Denies UTI symptoms. Records review indicate an E Coli UTI in late November of 2018. At that time she had clots in her urine and Hgb dropped to 6.7 necessitating a transfusion. Currently Hgb has dropped to 8.2 from 9.1 on admission. Currently she only has 3-5 RBCs in urine. No fever (99.2)  or leukocytosis. 1/28:  States she just doesn't feel well today, unable to elaborate. Slept most of day. Denies UTI symptoms, oriented. Family not in room at time of rounds. Glucose erratic. Diabetes management assisting.   
 
1/29:  Hgb dropped to 7.7 this am, recheck at 1800:  7.8. Will transfuse if declines further. Urine culture sent this am prior to starting rocephin. RN reports retaining urine, 650 ml in bladder with scan after patient attempted to urinate. Du anchored for now, no obvious hematuria. Voided without difficulty yesterday. Daughter and  at bedside. Explained to all current test results and plans to wait for final culture prior to discharging patient. Will return home on hospice for CHF. Denies need, pain or problems, states she is \"a little tired. \" ADDITIONAL HISTORY:  CHF, CAD, DM II, Cardiomyopathy, gout, chronic du, CKD, Stroke, PUD, hypertension, lymphedema, morbid obesity, persistent atrial fib, SOBO, DEWAYNE without CPAP, UTI, bedbound, home hospice for CHF Current Facility-Administered Medications Medication Dose Route Frequency  pantoprazole (PROTONIX) tablet 40 mg  40 mg Oral ACB&D  
 tuberculin injection 5 Units  5 Units IntraDERMal ONCE  
 cefTRIAXone (ROCEPHIN) 1 g in 0.9% sodium chloride (MBP/ADV) 50 mL  1 g IntraVENous Q24H  
 0.9% sodium chloride infusion  75 mL/hr IntraVENous CONTINUOUS  
 insulin lispro (HUMALOG) injection   SubCUTAneous AC&HS  carvedilol (COREG) tablet 12.5 mg  12.5 mg Oral BID WITH MEALS  furosemide (LASIX) tablet 20 mg  20 mg Oral DAILY  gabapentin (NEURONTIN) capsule 300 mg  300 mg Oral TID  isosorbide mononitrate ER (IMDUR) tablet 30 mg  30 mg Oral DAILY Review of Systems A comprehensive review of systems was negative except for that written in the HPI. Objective:  
 
Visit Vitals /57 (BP 1 Location: Right arm, BP Patient Position: At rest) Pulse 63 Temp 98.7 °F (37.1 °C) Resp 18 Wt 108.1 kg (238 lb 6.4 oz) SpO2 100% BMI 38.48 kg/m² O2 Flow Rate (L/min): 1 l/min(3 decreased to 1) O2 Device: Nasal cannula Temp (24hrs), Av °F (37.2 °C), Min:98.4 °F (36.9 °C), Max:99.7 °F (37.6 °C) General appearance: Oriented and alert, cooperative, denies pain or need. Eating and drinking normally at present.  and daughter at bedside. Head: Normocephalic, without obvious abnormality, atraumatic Eyes: conjunctivae/corneas clear. PERRL Throat: Lips, mucosa, and tongue normal. Teeth and gums normal 
Neck: supple, symmetrical, trachea midline, no JVD Lungs: clear to auscultation bilaterally, slightly diminished in bases. Heart: regular rate and rhythm, S1, S2 normal, no murmur, click, rub or gallop Abdomen: Bladder distended. Otherwise soft, non-tender. Bowel sounds normal. No masses,  no organomegaly Extremities: All extremities normal, atraumatic, no cyanosis or edema Skin: Skin color, texture, turgor normal. No rashes or lesions Neurologic: Grossly normal 
  
Additional comments: Notes,orders, test results, vitals reviewed 
  
Data Review Recent Results (from the past 24 hour(s)) CBC W/O DIFF Collection Time: 01/28/19 12:25 PM  
Result Value Ref Range WBC 5.0 4.3 - 11.1 K/uL  
 RBC 3.09 (L) 4.05 - 5.2 M/uL HGB 8.4 (L) 11.7 - 15.4 g/dL HCT 28.1 (L) 35.8 - 46.3 % MCV 90.9 79.6 - 97.8 FL  
 MCH 27.2 26.1 - 32.9 PG  
 MCHC 29.9 (L) 31.4 - 35.0 g/dL  
 RDW 15.2 (H) 11.9 - 14.6 % PLATELET 306 (L) 975 - 450 K/uL MPV 12.3 9.4 - 12.3 FL ABSOLUTE NRBC 0.00 0.0 - 0.2 K/uL METABOLIC PANEL, BASIC Collection Time: 01/28/19 12:25 PM  
Result Value Ref Range Sodium 131 (L) 136 - 145 mmol/L Potassium 5.7 (H) 3.5 - 5.1 mmol/L Chloride 95 (L) 98 - 107 mmol/L  
 CO2 30 21 - 32 mmol/L Anion gap 6 (L) 7 - 16 mmol/L Glucose 195 (H) 65 - 100 mg/dL BUN 81 (H) 8 - 23 MG/DL Creatinine 1.88 (H) 0.6 - 1.0 MG/DL  
 GFR est AA 33 (L) >60 ml/min/1.73m2 GFR est non-AA 27 (L) >60 ml/min/1.73m2 Calcium 10.0 8.3 - 10.4 MG/DL MAGNESIUM Collection Time: 01/28/19 12:25 PM  
Result Value Ref Range Magnesium 2.0 1.8 - 2.4 mg/dL GLUCOSE, POC Collection Time: 01/28/19  4:32 PM  
Result Value Ref Range Glucose (POC) 148 (H) 65 - 100 mg/dL GLUCOSE, POC Collection Time: 01/28/19  8:23 PM  
Result Value Ref Range Glucose (POC) 189 (H) 65 - 100 mg/dL CBC WITH AUTOMATED DIFF Collection Time: 01/29/19  6:10 AM  
Result Value Ref Range WBC 3.9 (L) 4.3 - 11.1 K/uL  
 RBC 2.74 (L) 4.05 - 5.2 M/uL HGB 7.5 (L) 11.7 - 15.4 g/dL HCT 24.7 (L) 35.8 - 46.3 % MCV 90.1 79.6 - 97.8 FL  
 MCH 27.4 26.1 - 32.9 PG  
 MCHC 30.4 (L) 31.4 - 35.0 g/dL  
 RDW 15.3 (H) 11.9 - 14.6 % PLATELET 521 (L) 186 - 450 K/uL MPV 12.1 9.4 - 12.3 FL ABSOLUTE NRBC 0.00 0.0 - 0.2 K/uL DF AUTOMATED NEUTROPHILS 69 43 - 78 % LYMPHOCYTES 15 13 - 44 % MONOCYTES 14 (H) 4.0 - 12.0 % EOSINOPHILS 2 0.5 - 7.8 % BASOPHILS 0 0.0 - 2.0 % IMMATURE GRANULOCYTES 0 0.0 - 5.0 %  
 ABS. NEUTROPHILS 2.7 1.7 - 8.2 K/UL  
 ABS. LYMPHOCYTES 0.6 0.5 - 4.6 K/UL  
 ABS. MONOCYTES 0.5 0.1 - 1.3 K/UL  
 ABS. EOSINOPHILS 0.1 0.0 - 0.8 K/UL  
 ABS. BASOPHILS 0.0 0.0 - 0.2 K/UL  
 ABS. IMM. GRANS. 0.0 0.0 - 0.5 K/UL METABOLIC PANEL, BASIC Collection Time: 01/29/19  6:10 AM  
Result Value Ref Range Sodium 134 (L) 136 - 145 mmol/L Potassium 4.5 3.5 - 5.1 mmol/L Chloride 97 (L) 98 - 107 mmol/L  
 CO2 29 21 - 32 mmol/L Anion gap 8 7 - 16 mmol/L Glucose 89 65 - 100 mg/dL BUN 87 (H) 8 - 23 MG/DL Creatinine 1.89 (H) 0.6 - 1.0 MG/DL  
 GFR est AA 33 (L) >60 ml/min/1.73m2 GFR est non-AA 27 (L) >60 ml/min/1.73m2 Calcium 9.9 8.3 - 10.4 MG/DL MAGNESIUM Collection Time: 01/29/19  6:10 AM  
Result Value Ref Range Magnesium 2.1 1.8 - 2.4 mg/dL BNP Collection Time: 01/29/19  6:10 AM  
Result Value Ref Range BNP 93 (H) 0 pg/mL GLUCOSE, POC Collection Time: 01/29/19  7:50 AM  
Result Value Ref Range Glucose (POC) 112 (H) 65 - 100 mg/dL GLUCOSE, POC Collection Time: 01/29/19 11:19 AM  
Result Value Ref Range Glucose (POC) 141 (H) 65 - 100 mg/dL Assessment/Plan:  
Persistent Hypoglycemia :  Resolved D 10 drip initiated on admission:  Discontinued 1/27 pm 
              Placed on SSI Holding home amaryl 2 mg:        
NIDDM: A1C 6.1 Holding home amaryl, will continue to hold on discharge and have patient follow with PHP for ongoing medication choices SAMAN on CKD with admission creatinine 1.61, baseline 1.41 Fluids for 24 hours only while monitoring for overload History of CHF 
CAD with Cardiomyopathy History of stroke Hypertension:  Stable on meds Lymphedema Morbid obesity Chronic atrial fib not on anticoags (prior GIB) Unclear when anticoags were discontinued, was taking in             September 2018 DEWAYNE without CPAP Bedbound at baseline Home hospice for CHF on discharge Discharge after culture resulted Care Plan discussed with: Patient, , daughter, care team and Nurse Signed By: Román Diaz NP   
 January 29, 2019

## 2019-01-29 NOTE — PROGRESS NOTES
MCR patient status was changed from OBS to IP. An Important Letter to Medicare was signed by this patient and put in patient's chart;this patient was provided with copy of the Important Letter to Medicare. Patient's Care Managers notified.

## 2019-01-29 NOTE — PROGRESS NOTES
Problem: Falls - Risk of 
Goal: *Absence of Falls Document Lluvia Hall Fall Risk and appropriate interventions in the flowsheet. Outcome: Progressing Towards Goal 
Fall Risk Interventions: 
  
 
  
 
Medication Interventions: Bed/chair exit alarm, Patient to call before getting OOB, Teach patient to arise slowly Elimination Interventions: Bed/chair exit alarm, Call light in reach, Patient to call for help with toileting needs, Toileting schedule/hourly rounds History of Falls Interventions: Bed/chair exit alarm, Door open when patient unattended, Room close to nurse's station Problem: Pressure Injury - Risk of 
Goal: *Prevention of pressure injury Document Lorenzo Scale and appropriate interventions in the flowsheet. Outcome: Progressing Towards Goal 
Pressure Injury Interventions: 
Sensory Interventions: Assess changes in LOC, Assess need for specialty bed, Check visual cues for pain, Discuss PT/OT consult with provider, Float heels Moisture Interventions: Absorbent underpads, Check for incontinence Q2 hours and as needed, Contain wound drainage, Limit adult briefs Activity Interventions: Assess need for specialty bed, Increase time out of bed, Pressure redistribution bed/mattress(bed type), PT/OT evaluation Mobility Interventions: Assess need for specialty bed, Chair cushion, HOB 30 degrees or less, Pressure redistribution bed/mattress (bed type), PT/OT evaluation Nutrition Interventions: Document food/fluid/supplement intake, Offer support with meals,snacks and hydration

## 2019-01-30 LAB
ANION GAP SERPL CALC-SCNC: 6 MMOL/L (ref 7–16)
BUN SERPL-MCNC: 87 MG/DL (ref 8–23)
CALCIUM SERPL-MCNC: 9.6 MG/DL (ref 8.3–10.4)
CHLORIDE SERPL-SCNC: 98 MMOL/L (ref 98–107)
CO2 SERPL-SCNC: 30 MMOL/L (ref 21–32)
CREAT SERPL-MCNC: 1.77 MG/DL (ref 0.6–1)
ERYTHROCYTE [DISTWIDTH] IN BLOOD BY AUTOMATED COUNT: 15.1 % (ref 11.9–14.6)
GLUCOSE BLD STRIP.AUTO-MCNC: 136 MG/DL (ref 65–100)
GLUCOSE BLD STRIP.AUTO-MCNC: 145 MG/DL (ref 65–100)
GLUCOSE BLD STRIP.AUTO-MCNC: 167 MG/DL (ref 65–100)
GLUCOSE BLD STRIP.AUTO-MCNC: 184 MG/DL (ref 65–100)
GLUCOSE SERPL-MCNC: 96 MG/DL (ref 65–100)
HCT VFR BLD AUTO: 24.7 % (ref 35.8–46.3)
HCT VFR BLD AUTO: 26.2 % (ref 35.8–46.3)
HGB BLD-MCNC: 7.6 G/DL (ref 11.7–15.4)
HGB BLD-MCNC: 7.9 G/DL (ref 11.7–15.4)
MAGNESIUM SERPL-MCNC: 2.2 MG/DL (ref 1.8–2.4)
MCH RBC QN AUTO: 27.6 PG (ref 26.1–32.9)
MCHC RBC AUTO-ENTMCNC: 30.8 G/DL (ref 31.4–35)
MCV RBC AUTO: 89.8 FL (ref 79.6–97.8)
NRBC # BLD: 0 K/UL (ref 0–0.2)
PLATELET # BLD AUTO: 98 K/UL (ref 150–450)
PMV BLD AUTO: 12.3 FL (ref 9.4–12.3)
POTASSIUM SERPL-SCNC: 4.7 MMOL/L (ref 3.5–5.1)
RBC # BLD AUTO: 2.75 M/UL (ref 4.05–5.2)
SODIUM SERPL-SCNC: 134 MMOL/L (ref 136–145)
WBC # BLD AUTO: 2.8 K/UL (ref 4.3–11.1)

## 2019-01-30 PROCEDURE — 74011250637 HC RX REV CODE- 250/637: Performed by: INTERNAL MEDICINE

## 2019-01-30 PROCEDURE — 85027 COMPLETE CBC AUTOMATED: CPT

## 2019-01-30 PROCEDURE — 96366 THER/PROPH/DIAG IV INF ADDON: CPT

## 2019-01-30 PROCEDURE — 65270000029 HC RM PRIVATE

## 2019-01-30 PROCEDURE — 74011250636 HC RX REV CODE- 250/636: Performed by: NURSE PRACTITIONER

## 2019-01-30 PROCEDURE — 77010033678 HC OXYGEN DAILY

## 2019-01-30 PROCEDURE — 82962 GLUCOSE BLOOD TEST: CPT

## 2019-01-30 PROCEDURE — 85018 HEMOGLOBIN: CPT

## 2019-01-30 PROCEDURE — 96361 HYDRATE IV INFUSION ADD-ON: CPT

## 2019-01-30 PROCEDURE — 74011636637 HC RX REV CODE- 636/637: Performed by: NURSE PRACTITIONER

## 2019-01-30 PROCEDURE — 74011000258 HC RX REV CODE- 258: Performed by: NURSE PRACTITIONER

## 2019-01-30 PROCEDURE — 36415 COLL VENOUS BLD VENIPUNCTURE: CPT

## 2019-01-30 PROCEDURE — 94760 N-INVAS EAR/PLS OXIMETRY 1: CPT

## 2019-01-30 PROCEDURE — 74011250637 HC RX REV CODE- 250/637: Performed by: NURSE PRACTITIONER

## 2019-01-30 PROCEDURE — 80048 BASIC METABOLIC PNL TOTAL CA: CPT

## 2019-01-30 PROCEDURE — 83735 ASSAY OF MAGNESIUM: CPT

## 2019-01-30 PROCEDURE — 65390000012 HC CONDITION CODE 44 OBSERVATION

## 2019-01-30 RX ORDER — PANTOPRAZOLE SODIUM 40 MG/1
40 TABLET, DELAYED RELEASE ORAL
Status: DISCONTINUED | OUTPATIENT
Start: 2019-01-31 | End: 2019-01-31 | Stop reason: HOSPADM

## 2019-01-30 RX ADMIN — INSULIN LISPRO 2 UNITS: 100 INJECTION, SOLUTION INTRAVENOUS; SUBCUTANEOUS at 09:20

## 2019-01-30 RX ADMIN — FUROSEMIDE 20 MG: 20 TABLET ORAL at 09:20

## 2019-01-30 RX ADMIN — CEFTRIAXONE SODIUM 1 G: 1 INJECTION, POWDER, FOR SOLUTION INTRAMUSCULAR; INTRAVENOUS at 09:20

## 2019-01-30 RX ADMIN — INSULIN LISPRO 2 UNITS: 100 INJECTION, SOLUTION INTRAVENOUS; SUBCUTANEOUS at 16:30

## 2019-01-30 RX ADMIN — PANTOPRAZOLE SODIUM 40 MG: 40 TABLET, DELAYED RELEASE ORAL at 06:15

## 2019-01-30 RX ADMIN — ISOSORBIDE MONONITRATE 30 MG: 30 TABLET, EXTENDED RELEASE ORAL at 09:20

## 2019-01-30 RX ADMIN — PANTOPRAZOLE SODIUM 40 MG: 40 TABLET, DELAYED RELEASE ORAL at 16:52

## 2019-01-30 RX ADMIN — GABAPENTIN 300 MG: 300 CAPSULE ORAL at 09:20

## 2019-01-30 RX ADMIN — CARVEDILOL 12.5 MG: 12.5 TABLET, FILM COATED ORAL at 16:52

## 2019-01-30 RX ADMIN — GABAPENTIN 300 MG: 300 CAPSULE ORAL at 21:18

## 2019-01-30 RX ADMIN — GABAPENTIN 300 MG: 300 CAPSULE ORAL at 16:53

## 2019-01-30 RX ADMIN — CARVEDILOL 12.5 MG: 12.5 TABLET, FILM COATED ORAL at 09:20

## 2019-01-30 NOTE — PROGRESS NOTES
Hospitalist Progress Note Subjective:  
Daily Progress Note: 2019 12:22 PM and 2100 Patient is a 81 yo female with PMH of CAD, DM, and cardiomyopathy has hospice for CHF, recent UTi with E coli, who presented to ER  for AMS with hypoglycemia with BG in the 20's. Patient reports not checking glucose as she is out of strips.  A1C: 6.1.  Patient was started on D10 and weaned off. UA positive. Started on Rocephin. NGTD. Patient with urinary retention. Du placed. Hgb 7.6. It was 9.1 on admission. Occult stool negative. Urine clear. ADDITIONAL HISTORY:  CHF, CAD, DM II, Cardiomyopathy, gout, chronic du, CKD, Stroke, PUD, hypertension, lymphedema, morbid obesity, persistent atrial fib, SOBO, DEWAYNE without CPAP, UTI, bedbound, home hospice for CHF Current Facility-Administered Medications Medication Dose Route Frequency  pantoprazole (PROTONIX) tablet 40 mg  40 mg Oral ACB&D  cefTRIAXone (ROCEPHIN) 1 g in 0.9% sodium chloride (MBP/ADV) 50 mL  1 g IntraVENous Q24H  
 insulin lispro (HUMALOG) injection   SubCUTAneous AC&HS  carvedilol (COREG) tablet 12.5 mg  12.5 mg Oral BID WITH MEALS  furosemide (LASIX) tablet 20 mg  20 mg Oral DAILY  gabapentin (NEURONTIN) capsule 300 mg  300 mg Oral TID  isosorbide mononitrate ER (IMDUR) tablet 30 mg  30 mg Oral DAILY Review of Systems A comprehensive review of systems was negative except for that written in the HPI. Objective:  
 
Visit Vitals /61 (BP 1 Location: Right arm, BP Patient Position: At rest) Pulse 72 Temp 98.7 °F (37.1 °C) Resp 18 Wt 108.1 kg (238 lb 6.4 oz) SpO2 96% BMI 38.48 kg/m² O2 Flow Rate (L/min): 2 l/min O2 Device: Nasal cannula Temp (24hrs), Av.5 °F (36.9 °C), Min:98.1 °F (36.7 °C), Max:98.7 °F (37.1 °C) General appearance: Oriented and alert, cooperative, no distress noted. Head: Normocephalic, without obvious abnormality, atraumatic Eyes: conjunctivae/corneas clear. PERRL Throat: Lips, mucosa, and tongue normal. Teeth and gums normal 
Neck: supple, symmetrical, trachea midline, no JVD Lungs: clear to auscultation bilaterally, slightly diminished in bases. Heart: regular rate and rhythm, S1, S2 normal, no murmur, click, rub or gallop Abdomen:Otherwise soft, non-tender. Bowel sounds normal. No masses,  no organomegaly Extremities: All extremities normal, atraumatic, no cyanosis or edema Skin: Skin color, texture, turgor normal. No rashes or lesions Neurologic: Grossly normal 
  
Additional comments: Notes,orders, test results, vitals reviewed 
  
Data Review Recent Results (from the past 24 hour(s)) OCCULT BLOOD, STOOL Collection Time: 01/29/19  1:39 PM  
Result Value Ref Range Occult blood, stool NEGATIVE  NEG    
GLUCOSE, POC Collection Time: 01/29/19  4:51 PM  
Result Value Ref Range Glucose (POC) 214 (H) 65 - 100 mg/dL HGB & HCT Collection Time: 01/29/19  5:44 PM  
Result Value Ref Range HGB 7.8 (L) 11.7 - 15.4 g/dL HCT 25.8 (L) 35.8 - 46.3 % GLUCOSE, POC Collection Time: 01/29/19  8:38 PM  
Result Value Ref Range Glucose (POC) 160 (H) 65 - 100 mg/dL METABOLIC PANEL, BASIC Collection Time: 01/30/19  6:18 AM  
Result Value Ref Range Sodium 134 (L) 136 - 145 mmol/L Potassium 4.7 3.5 - 5.1 mmol/L Chloride 98 98 - 107 mmol/L  
 CO2 30 21 - 32 mmol/L Anion gap 6 (L) 7 - 16 mmol/L Glucose 96 65 - 100 mg/dL BUN 87 (H) 8 - 23 MG/DL Creatinine 1.77 (H) 0.6 - 1.0 MG/DL  
 GFR est AA 35 (L) >60 ml/min/1.73m2 GFR est non-AA 29 (L) >60 ml/min/1.73m2 Calcium 9.6 8.3 - 10.4 MG/DL  
CBC W/O DIFF Collection Time: 01/30/19  6:18 AM  
Result Value Ref Range WBC 2.8 (L) 4.3 - 11.1 K/uL  
 RBC 2.75 (L) 4.05 - 5.2 M/uL HGB 7.6 (L) 11.7 - 15.4 g/dL HCT 24.7 (L) 35.8 - 46.3 % MCV 89.8 79.6 - 97.8 FL  
 MCH 27.6 26.1 - 32.9 PG  
 MCHC 30.8 (L) 31.4 - 35.0 g/dL RDW 15.1 (H) 11.9 - 14.6 % PLATELET 98 (L) 236 - 450 K/uL MPV 12.3 9.4 - 12.3 FL ABSOLUTE NRBC 0.00 0.0 - 0.2 K/uL MAGNESIUM Collection Time: 01/30/19  6:18 AM  
Result Value Ref Range Magnesium 2.2 1.8 - 2.4 mg/dL GLUCOSE, POC Collection Time: 01/30/19  7:51 AM  
Result Value Ref Range Glucose (POC) 184 (H) 65 - 100 mg/dL GLUCOSE, POC Collection Time: 01/30/19 11:13 AM  
Result Value Ref Range Glucose (POC) 136 (H) 65 - 100 mg/dL Assessment/Plan: Anemia 
-Occult stool negative 
-Iron studies -Monitor H&H q 12 Urinary retention - DC du and monitor for retention Hypoglycemia :  Resolved D10 drip initiated on admission:  Discontinued 1/27 pm 
             Continue SSI Holding home amaryl 2 mg:    
     
NIDDM: A1C 6.1 
            -Holding home amaryl, will continue to hold on discharge and have patient follow with PCP              for ongoing medication choices 
              -SSI SAMAN on CKD with admission creatinine 1.61, baseline 1.41 
            -BMP daily Hx CHF - CAD - Cardiomyopathy 
-Continue home medications 
-Monitor for fluid overload 
-On lasix PO. Monitor I&O's Hypertension:  Stable on meds Chronic atrial fib not on anticoags (prior GIB) Unclear when anticoags were discontinued, was taking in September 2018 DEWAYNE without CPAP Discharge dispo - Bedbound at baseline, continue home hospice DVT ppx - SCD's Care Plan discussed with Dr. Lonzell Mohs, Signed By: Brandi Lay NP - C January 30, 2019

## 2019-01-30 NOTE — INTERDISCIPLINARY ROUNDS
Interdisciplinary Rounds completed 1/30/19. Nursing, Case Management, Physician and PT present. Plan of care reviewed and updated. Awaiting urine culture results.

## 2019-01-31 VITALS
OXYGEN SATURATION: 95 % | RESPIRATION RATE: 18 BRPM | DIASTOLIC BLOOD PRESSURE: 56 MMHG | SYSTOLIC BLOOD PRESSURE: 114 MMHG | WEIGHT: 238.4 LBS | TEMPERATURE: 98.3 F | BODY MASS INDEX: 38.48 KG/M2 | HEART RATE: 67 BPM

## 2019-01-31 PROBLEM — N39.0 UTI (URINARY TRACT INFECTION): Status: ACTIVE | Noted: 2019-01-31

## 2019-01-31 LAB
ANION GAP SERPL CALC-SCNC: 8 MMOL/L (ref 7–16)
BACTERIA SPEC CULT: NORMAL
BUN SERPL-MCNC: 85 MG/DL (ref 8–23)
CALCIUM SERPL-MCNC: 9.8 MG/DL (ref 8.3–10.4)
CHLORIDE SERPL-SCNC: 97 MMOL/L (ref 98–107)
CO2 SERPL-SCNC: 29 MMOL/L (ref 21–32)
CREAT SERPL-MCNC: 1.63 MG/DL (ref 0.6–1)
ERYTHROCYTE [DISTWIDTH] IN BLOOD BY AUTOMATED COUNT: 14.9 % (ref 11.9–14.6)
FERRITIN SERPL-MCNC: 299 NG/ML (ref 8–388)
FOLATE SERPL-MCNC: 7.9 NG/ML (ref 3.1–17.5)
GLUCOSE BLD STRIP.AUTO-MCNC: 103 MG/DL (ref 65–100)
GLUCOSE BLD STRIP.AUTO-MCNC: 144 MG/DL (ref 65–100)
GLUCOSE BLD STRIP.AUTO-MCNC: 172 MG/DL (ref 65–100)
GLUCOSE SERPL-MCNC: 97 MG/DL (ref 65–100)
HCT VFR BLD AUTO: 25 % (ref 35.8–46.3)
HCT VFR BLD AUTO: 26 % (ref 35.8–46.3)
HGB BLD-MCNC: 7.6 G/DL (ref 11.7–15.4)
HGB BLD-MCNC: 7.9 G/DL (ref 11.7–15.4)
IRON SATN MFR SERPL: 16 %
IRON SERPL-MCNC: 37 UG/DL (ref 35–150)
IRON SERPL-MCNC: 39 UG/DL (ref 35–150)
MCH RBC QN AUTO: 27.4 PG (ref 26.1–32.9)
MCHC RBC AUTO-ENTMCNC: 30.4 G/DL (ref 31.4–35)
MCV RBC AUTO: 90.3 FL (ref 79.6–97.8)
NRBC # BLD: 0 K/UL (ref 0–0.2)
PLATELET # BLD AUTO: 113 K/UL (ref 150–450)
PMV BLD AUTO: 12.2 FL (ref 9.4–12.3)
POTASSIUM SERPL-SCNC: 4.5 MMOL/L (ref 3.5–5.1)
RBC # BLD AUTO: 2.77 M/UL (ref 4.05–5.2)
SERVICE CMNT-IMP: NORMAL
SODIUM SERPL-SCNC: 134 MMOL/L (ref 136–145)
TIBC SERPL-MCNC: 238 UG/DL (ref 250–450)
VIT B12 SERPL-MCNC: 523 PG/ML (ref 193–986)
WBC # BLD AUTO: 2.9 K/UL (ref 4.3–11.1)

## 2019-01-31 PROCEDURE — 36415 COLL VENOUS BLD VENIPUNCTURE: CPT

## 2019-01-31 PROCEDURE — 80048 BASIC METABOLIC PNL TOTAL CA: CPT

## 2019-01-31 PROCEDURE — 83540 ASSAY OF IRON: CPT

## 2019-01-31 PROCEDURE — 99218 HC RM OBSERVATION: CPT

## 2019-01-31 PROCEDURE — 96366 THER/PROPH/DIAG IV INF ADDON: CPT

## 2019-01-31 PROCEDURE — 65390000012 HC CONDITION CODE 44 OBSERVATION

## 2019-01-31 PROCEDURE — 74011250636 HC RX REV CODE- 250/636: Performed by: NURSE PRACTITIONER

## 2019-01-31 PROCEDURE — 74011250637 HC RX REV CODE- 250/637: Performed by: INTERNAL MEDICINE

## 2019-01-31 PROCEDURE — 82962 GLUCOSE BLOOD TEST: CPT

## 2019-01-31 PROCEDURE — 85027 COMPLETE CBC AUTOMATED: CPT

## 2019-01-31 PROCEDURE — 82728 ASSAY OF FERRITIN: CPT

## 2019-01-31 PROCEDURE — 74011000258 HC RX REV CODE- 258: Performed by: NURSE PRACTITIONER

## 2019-01-31 PROCEDURE — 82746 ASSAY OF FOLIC ACID SERUM: CPT

## 2019-01-31 PROCEDURE — 82607 VITAMIN B-12: CPT

## 2019-01-31 PROCEDURE — 85018 HEMOGLOBIN: CPT

## 2019-01-31 PROCEDURE — 74011636637 HC RX REV CODE- 636/637: Performed by: NURSE PRACTITIONER

## 2019-01-31 RX ORDER — CEFPODOXIME PROXETIL 100 MG/1
100 TABLET, FILM COATED ORAL 2 TIMES DAILY
Qty: 4 TAB | Refills: 0 | Status: SHIPPED | OUTPATIENT
Start: 2019-01-31 | End: 2019-02-02

## 2019-01-31 RX ADMIN — ISOSORBIDE MONONITRATE 30 MG: 30 TABLET, EXTENDED RELEASE ORAL at 09:45

## 2019-01-31 RX ADMIN — CARVEDILOL 12.5 MG: 12.5 TABLET, FILM COATED ORAL at 09:45

## 2019-01-31 RX ADMIN — CEFTRIAXONE SODIUM 1 G: 1 INJECTION, POWDER, FOR SOLUTION INTRAMUSCULAR; INTRAVENOUS at 09:45

## 2019-01-31 RX ADMIN — FUROSEMIDE 20 MG: 20 TABLET ORAL at 09:45

## 2019-01-31 RX ADMIN — CARVEDILOL 12.5 MG: 12.5 TABLET, FILM COATED ORAL at 16:26

## 2019-01-31 RX ADMIN — INSULIN LISPRO 2 UNITS: 100 INJECTION, SOLUTION INTRAVENOUS; SUBCUTANEOUS at 16:40

## 2019-01-31 RX ADMIN — GABAPENTIN 300 MG: 300 CAPSULE ORAL at 16:25

## 2019-01-31 RX ADMIN — PANTOPRAZOLE SODIUM 40 MG: 40 TABLET, DELAYED RELEASE ORAL at 05:09

## 2019-01-31 RX ADMIN — GABAPENTIN 300 MG: 300 CAPSULE ORAL at 09:45

## 2019-01-31 NOTE — PROGRESS NOTES
Virtual Utilization Review RN Yuliya has determined this patient meets the Condition Code 44 criteria under the Medicare guidelines for change from Inpatient to observation status. Admission status has been changed in the computer system. A copy of the Utilization Review RN documentation/[facility] Account Notes Report and the [facility] letter explaining the outpatient/observation services were given to patient with verbal explanation and verbal understanding was received about information given. Letter initialed by patient with date and time. Initialed copy placed in the patient's chart for scanning by HIS and copy left at bedside for patient information.  notified.

## 2019-01-31 NOTE — DISCHARGE SUMMARY
Hospitalist Discharge Summary     Admit Date:  2019  1:45 PM   Name:  Alecia Schmitt   Age:  80 y.o.  :  1935   MRN:  [id number]   PCP:  Geovanna Willis MD  Treatment Team: Attending Provider: Raudel Jorge MD; Utilization Review: Srinivas Moreno RN; Care Manager: aFye Patino RN    Problem List for this Hospitalization:  Hospital Problems as of 2019 Date Reviewed: 10/22/2018          Codes Class Noted - Resolved POA    UTI (urinary tract infection) ICD-10-CM: N39.0  ICD-9-CM: 599.0  2019 - Present Unknown        Hypoglycemia ICD-10-CM: E16.2  ICD-9-CM: 251.2  2019 - Present Yes        SAMAN (acute kidney injury) ([facility] [address] ICD-10-CM: N17.9  ICD-9-CM: 584.9  2019 - Present Yes        Acute blood loss anemia ICD-10-CM: D62  ICD-9-CM: 285.1  2018 - Present Yes        Acute UTI ICD-10-CM: N39.0  ICD-9-CM: 599.0  12/3/2012 - Present Yes    Overview Signed 12/3/2012 12:23 PM by Jia Snow with chronic indwelling du catheter                     Admission HPI from 2019:    \" Review H&P for details of admission \"    Hospital Course:  Patient is a 81 yo female with PMH of CAD, DM, and cardiomyopathy has hospice for CHF, recent UTi with E coli, who presented to ER  for AMS with hypoglycemia with BG in the 20's. Patient reports not checking glucose as she is out of strips.  A1C: 6.1.  Patient was started on D10 and weaned off. Amaryl discontinued due to hypoglycemia and SAMAN. Will not restart at discharge. UA was positive. Patient treated with Rocephin x 3 days, will continue with vantin as OP for 2 additional days. Patient with anemia. No bleeding source found. Patient to follow up with PCP after discharge for further work up for anemia,  and evaluate patient's BG levels and appropriate medication.      Follow up instructions and discharge meds at bottom of this note. Plan was discussed with patient, spouse, and son. All questions answered. Patient was stable at time of discharge. Diagnostic Imaging/Tests:   No results found. Echocardiogram results:  No results found for this visit on 01/26/19. All Micro Results     Procedure Component Value Units Date/Time    CULTURE, URINE [[id number]] Collected:  01/29/19 8257    Order Status:  Completed Specimen:  Urine from Clean catch Updated:  01/31/19 0721     Special Requests: NO SPECIAL REQUESTS        Culture result:       <10,000 COLONIES/mL MIXED SKIN PANDA ISOLATED                Labs: Results:       BMP, Mg, Phos Recent Labs     01/31/19 0617 01/30/19 0618 01/29/19 0610   * 134* 134*   K 4.5 4.7 4.5   CL 97* 98 97*   CO2 29 30 29   AGAP 8 6* 8   BUN 85* 87* 87*   CREA 1.63* 1.77* 1.89*   CA 9.8 9.6 9.9   GLU 97 96 89   MG  --  2.2 2.1      CBC Recent Labs     01/31/19  1138 01/31/19 0617 01/30/19 2001 01/30/19 0618 01/29/19 0610   WBC  --  2.9*  --  2.8*  --  3.9*   RBC  --  2.77*  --  2.75*  --  2.74*   HGB 7.9* 7.6* 7.9* 7.6*   < > 7.5*   HCT 26.0* 25.0* 26.2* 24.7*   < > 24.7*   PLT  --  113*  --  98*  --  112*   GRANS  --   --   --   --   --  69   LYMPH  --   --   --   --   --  15   EOS  --   --   --   --   --  2   MONOS  --   --   --   --   --  14*   BASOS  --   --   --   --   --  0   IG  --   --   --   --   --  0   ANEU  --   --   --   --   --  2.7   ABL  --   --   --   --   --  0.6   LING  --   --   --   --   --  0.1   ABM  --   --   --   --   --  0.5   ABB  --   --   --   --   --  0.0   AIG  --   --   --   --   --  0.0    < > = values in this interval not displayed. LFT No results for input(s): SGOT, ALT, TBIL, AP, TP, ALB, GLOB, AGRAT, GPT in the last 72 hours.    Cardiac Testing Lab Results   Component Value Date/Time    BNP 93 (H) 01/29/2019 06:10 AM     08/16/2018 09:54 AM     08/24/2016 12:12 PM     08/10/2016 05:00 PM     07/26/2016 07:25 AM    B-type Natriuretic Peptide 69.2 10/05/2017 11:38 AM    Troponin-I, Qt. 0.03 11/24/2018 05:14 AM    Troponin-I, Qt. 0.03 11/24/2018 12:53 AM    Troponin-I, Qt. 0.02 09/14/2018 11:04 AM      Coagulation Tests Lab Results   Component Value Date/Time    Prothrombin time 23.7 (H) 11/23/2018 08:40 PM    Prothrombin time 11.6 (H) 08/17/2013 02:55 PM    Prothrombin time 11.6 (H) 08/07/2013 10:40 PM    INR 2.2 11/23/2018 08:40 PM    INR 1.1 08/17/2013 02:55 PM    INR 1.1 08/07/2013 10:40 PM    aPTT 58.4 (H) 11/23/2018 08:40 PM    aPTT 31.2 08/07/2013 10:40 PM    aPTT 48.8 (H) 10/29/2009 06:00 AM      A1c Lab Results   Component Value Date/Time    Hemoglobin A1c 6.1 (H) 01/27/2019 04:55 AM    Hemoglobin A1c 8.1 (H) 11/26/2018 05:26 AM    Hemoglobin A1c CANCELED 10/22/2018 12:02 PM      Lipid Panel Lab Results   Component Value Date/Time    Cholesterol, total 128 07/18/2017 02:54 PM    HDL Cholesterol 56 07/18/2017 02:54 PM    LDL, calculated 46 07/18/2017 02:54 PM    VLDL, calculated 26 07/18/2017 02:54 PM    Triglyceride 129 07/18/2017 02:54 PM    CHOL/HDL Ratio 1.8 08/09/2013 06:13 AM      Thyroid Panel Lab Results   Component Value Date/Time    TSH 2.690 10/22/2018 12:02 PM    TSH 2.730 04/11/2018 04:20 PM        Most Recent UA Lab Results   Component Value Date/Time    Color YELLOW 01/27/2019 12:30 PM    Appearance CLOUDY 01/27/2019 12:30 PM    Specific gravity 1.016 01/27/2019 12:30 PM    pH (UA) 5.5 01/27/2019 12:30 PM    Protein NEGATIVE  01/27/2019 12:30 PM    Glucose NEGATIVE  01/27/2019 12:30 PM    Ketone NEGATIVE  01/27/2019 12:30 PM    Bilirubin NEGATIVE  01/27/2019 12:30 PM    Blood TRACE (A) 01/27/2019 12:30 PM    Urobilinogen 0.2 01/27/2019 12:30 PM    Nitrites NEGATIVE  01/27/2019 12:30 PM    Leukocyte Esterase LARGE (A) 01/27/2019 12:30 PM        Allergies   Allergen Reactions    Benazepril Angioedema     Tongue swelling    Dynacirc [Isradipine] Angioedema     Tongue swelling    Peanut Angioedema     Tongue swelling    Morphine Other (comments)     Unknown rxn, \"morphine just does not agree with me\"     Immunization History   Administered Date(s) Administered    Influenza Vaccine 10/05/2015    Influenza Vaccine (Quad) PF 09/14/2018    Pneumococcal Conjugate (PCV-13) 06/14/2017    TB Skin Test (PPD) Intradermal 08/19/2013, 09/07/2018       All Labs from Last 24 Hrs:  Recent Results (from the past 24 hour(s))   GLUCOSE, POC    Collection Time: 01/30/19  4:14 PM   Result Value Ref Range    Glucose (POC) 167 (H) 65 - 100 mg/dL   HGB & HCT    Collection Time: 01/30/19  8:01 PM   Result Value Ref Range    HGB 7.9 (L) 11.7 - 15.4 g/dL    HCT 26.2 (L) 35.8 - 46.3 %   GLUCOSE, POC    Collection Time: 01/30/19  8:17 PM   Result Value Ref Range    Glucose (POC) 145 (H) 65 - 658 mg/dL   METABOLIC PANEL, BASIC    Collection Time: 01/31/19  6:17 AM   Result Value Ref Range    Sodium 134 (L) 136 - 145 mmol/L    Potassium 4.5 3.5 - 5.1 mmol/L    Chloride 97 (L) 98 - 107 mmol/L    CO2 29 21 - 32 mmol/L    Anion gap 8 7 - 16 mmol/L    Glucose 97 65 - 100 mg/dL    BUN 85 (H) 8 - 23 MG/DL    Creatinine 1.63 (H) 0.6 - 1.0 MG/DL    GFR est AA 39 (L) >60 ml/min/1.73m2    GFR est non-AA 32 (L) >60 ml/min/1.73m2    Calcium 9.8 8.3 - 10.4 MG/DL   CBC W/O DIFF    Collection Time: 01/31/19  6:17 AM   Result Value Ref Range    WBC 2.9 (L) 4.3 - 11.1 K/uL    RBC 2.77 (L) 4.05 - 5.2 M/uL    HGB 7.6 (L) 11.7 - 15.4 g/dL    HCT 25.0 (L) 35.8 - 46.3 %    MCV 90.3 79.6 - 97.8 FL    MCH 27.4 26.1 - 32.9 PG    MCHC 30.4 (L) 31.4 - 35.0 g/dL    RDW 14.9 (H) 11.9 - 14.6 %    PLATELET 218 (L) 843 - 450 K/uL    MPV 12.2 9.4 - 12.3 FL    ABSOLUTE NRBC 0.00 0.0 - 0.2 K/uL   IRON    Collection Time: 01/31/19  6:17 AM   Result Value Ref Range    Iron 37 35 - 150 ug/dL   TRANSFERRIN SATURATION    Collection Time: 01/31/19  6:17 AM   Result Value Ref Range    Iron 39 35 - 150 ug/dL    TIBC 238 (L) 250 - 450 ug/dL    Transferrin Saturation 16 (L) >20 %   FERRITIN    Collection Time: 01/31/19  6:17 AM   Result Value Ref Range    Ferritin 299 8 - 388 NG/ML   VITAMIN B12    Collection Time: 01/31/19  6:17 AM   Result Value Ref Range    Vitamin B12 523 193 - 986 pg/mL   FOLATE    Collection Time: 01/31/19  6:17 AM   Result Value Ref Range    Folate 7.9 3.1 - 17.5 ng/mL   GLUCOSE, POC    Collection Time: 01/31/19  7:39 AM   Result Value Ref Range    Glucose (POC) 103 (H) 65 - 100 mg/dL   HGB & HCT    Collection Time: 01/31/19 11:38 AM   Result Value Ref Range    HGB 7.9 (L) 11.7 - 15.4 g/dL    HCT 26.0 (L) 35.8 - 46.3 %   GLUCOSE, POC    Collection Time: 01/31/19 11:44 AM   Result Value Ref Range    Glucose (POC) 144 (H) 65 - 100 mg/dL       Discharge Exam:  Patient Vitals for the past 24 hrs:   Temp Pulse Resp BP SpO2   01/31/19 0830 98.3 °F (36.8 °C) 72 18 113/59 95 %   01/31/19 0346 98.1 °F (36.7 °C) 71 17 123/62 99 %   01/30/19 2235 97.8 °F (36.6 °C) 84 18 112/67 98 %   01/30/19 1911 97.9 °F (36.6 °C) 75 18 116/66 99 %   01/30/19 1540 98.6 °F (37 °C) 79 16 126/66 91 %     Oxygen Therapy  O2 Sat (%): 95 % (01/31/19 0830)  Pulse via Oximetry: 88 beats per minute (01/30/19 1117)  O2 Device: Nasal cannula (01/30/19 1117)  O2 Flow Rate (L/min): 2 l/min (01/30/19 1117)    Intake/Output Summary (Last 24 hours) at 1/31/2019 1321  Last data filed at 1/30/2019 1434  Gross per 24 hour   Intake    Output 700 ml   Net -700 ml       General:    Well nourished. Alert. No distress. Eyes:   Normal sclera. Extraocular movements intact. ENT:  Normocephalic, atraumatic. Moist mucous membranes  CV:   Regular rate and rhythm. No murmur, rub, or gallop. Lungs:  Clear to auscultation bilaterally. No wheezing, rhonchi, or rales. Abdomen: Soft, nontender, nondistended. Bowel sounds normal.   Extremities: Warm and dry. No cyanosis or edema. Neurologic: CN II-XII grossly intact. Sensation intact. Skin:     No rashes or jaundice. Psych:  Normal mood and affect.     Discharge Info:   Current Discharge Medication List      START taking these medications    Details cefpodoxime (VANTIN) 100 mg tablet Take 1 Tab by mouth two (2) times a day for 2 days. Qty: 4 Tab, Refills: 0         CONTINUE these medications which have NOT CHANGED    Details   potassium chloride (K-DUR, KLOR-CON) 20 mEq tablet Take 1 Tab by mouth daily. Qty: 30 Tab, Refills: 0      aspirin delayed-release 325 mg tablet Take 1 Tab by mouth daily. Qty: 30 Tab, Refills: 0      furosemide (LASIX) 20 mg tablet Take 1 Tab by mouth daily. Qty: 30 Tab, Refills: 0    Associated Diagnoses: Edema, unspecified type      isosorbide mononitrate ER (IMDUR) 30 mg tablet TAKE 1 TABLET BY MOUTH DAILY. Qty: 90 Tab, Refills: 3      pantoprazole (PROTONIX) 40 mg tablet TAKE 1 TABLET BY MOUTH DAILY (BEFORE BREAKFAST). Qty: 90 Tab, Refills: 0      gabapentin (NEURONTIN) 300 mg capsule Take 1 Cap by mouth three (3) times daily. Qty: 90 Cap, Refills: 1    Associated Diagnoses: Pain in both lower extremities      carvedilol (COREG) 12.5 mg tablet TAKE 1 TABLET BY MOUTH TWO (2) TIMES DAILY (WITH MEALS). Qty: 180 Tab, Refills: 3      ascorbic acid, vitamin C, (VITAMIN C) 500 mg tablet Take 500 mg by mouth daily. cholecalciferol (VITAMIN D3) 1,000 unit cap Take 1 Cap by mouth daily.   Qty: 90 Cap, Refills: 4         STOP taking these medications       glimepiride (AMARYL) 2 mg tablet Comments:   Reason for Stopping:                 Disposition: [facility] with [facility]    Activity: Activity as tolerated  Diet: DIET CARDIAC Regular; Consistent Carb 2000kcal    Follow-up Appointments   Procedures    FOLLOW UP VISIT Appointment in: 3 - 5 Days     Standing Status:   Standing     Number of Occurrences:   1     Order Specific Question:   Appointment in     Answer:   3 - 5 Days         Follow-up Information     Follow up With Specialties Details Why Contact Info    Orquidea Bill MD Internal Medicine  follow up post hospitalization  [address] [facility] [address] [address]      Orquidea Bill MD Internal Medicine   [address] [place] [id number]  [phone]            Time spent in patient discharge planning and coordination 35 minutes.   Discharge plan discussed with Dr. Homa Zamora,    Signed:  DAVID Sweeney

## 2019-01-31 NOTE — PROGRESS NOTES
Discharge instructions and prescriptions given and reviewed with pt and son, verbalizes understanding, pt is waiting on a wound care consult prior to discharge, then will discharge home by ambulance transport, CM arranging this.

## 2019-01-31 NOTE — WOUND CARE
Right groin/ lower abdomen with patches of partial thickness skin loss and lines of excoriation, consistent with friction/ moisture irritation, recommend zinc based barrier cream, ordered. Will monitor.

## 2019-01-31 NOTE — ADT AUTH CERT NOTES
INITIAL PHYSICIAN ORDER: OBSERVATION/OUTPATIENT IN A BED OBSERVATION; Medical; symptoms of uti [[id number]] (Order [id number]) ADT Transfer Date and Time: 1/31/2019 12:24 PM Department: [facility] MED SURG Ordering/Authorizing: Cortney Coates NP Transfusion Information Product Unit Status Volume Start End  
     INITIAL PHYSICIAN ORDER: OBSERVATION/OUTPATIENT IN A BED OBSERVATION; Medical; * Not assigned Completed 01/31/19 2359 Order Providers Authorizing Cortney Coates  
  
   
Order Information Order Date/Time Release Date/Time Start Date/Time End Date/Time 01/31/19 12:24 PM None 01/31/19 12:30 PM 01/31/19 12:30 PM  
CSN:  
[id number] Order Details Frequency Duration Priority Order Class ONE TIME 1  occurrence Routine ADT Pend Transfer Reprint OrderRequisition - Outpatient Only INITIAL PHYSICIAN ORDER: OBSERVATION/OUTPATIENT IN A BED OBSERVATION; Medical; symptoms of uti (Order #[id number]) on 1/31/19 Standing Order Information Remaining Occurrences Interval Last Released 0/1 ONE TIME 1/31/2019     
  
   
Released Orders  
 
  Released On Scheduled For Released By   
1. 1/31/2019 12:24 PM 1/31/2019 12:30 PM Baljinder Heath RN (auto-released)   
      
  
   
Order Questions Question Answer Comment Patient Class: OBSERVATION Type of Bed Medical   
Reason for Observation symptoms of uti Admitting Diagnosis UTI (urinary tract infection) Admitting Physician Jenaro BOUCHER Attending Physician Jenaro BOUCHER   
  
   
Collection Information Verbal Order Info Action Created on Order Mode Entered by Comment Responsible Provider Signed by Signed on  
Ordering 01/31/19 1224 Verbal with Stefanie Hung RN physician advisor recommendation VONNIE Gonzalez NP 01/31/19 1254 Additional Information Associated Reports View Encounter Priority and Order Details Process Instructions Plan of Care: See Progress Notes, H&P and Physician Orders Panel Detail for ADMISSION ORDERS Panel Medications Disp Refills Start End   
INITIAL PHYSICIAN ORDER: OBSERVATION/OUTPATIENT IN A BED OBSERVATION; Medical; symptoms of uti   1/31/2019 1/31/2019 Sig: One time. Class: ADT Pend Transfer NPI Information  
 
  
Electronically signed by Authorizing Provider: Annamaria Pereira [id number]     Order start date/time: 1/31/2019 12:30 PM 
Electronically signed by Co-signing Provider (if required):     
   
  
   
Order Report Order Details

## 2019-01-31 NOTE — PROGRESS NOTES
Pt has reddened area R groin with small amount of blood, external catheter placed to keep pt dry. Skin folds reddened. Call to Ant Lester NP, new order received, order wound consult, have pt seen prior to discharge with recommendations.

## 2019-01-31 NOTE — DISCHARGE INSTRUCTIONS
Follow up with primary care provider and discuss the events of this admission. Monitor blood glucose levels and chart on a piece of paper and take with you to visit. Drink plenty of fluids. DISCHARGE SUMMARY from Nurse    PATIENT INSTRUCTIONS:    After general anesthesia or intravenous sedation, for 24 hours or while taking prescription Narcotics:  · Limit your activities  · Do not drive and operate hazardous machinery  · Do not make important personal or business decisions  · Do  not drink alcoholic beverages  · If you have not urinated within 8 hours after discharge, please contact your surgeon on call. Report the following to your surgeon:  · Excessive pain, swelling, redness or odor of or around the surgical area  · Temperature over 100.5  · Nausea and vomiting lasting longer than 4 hours or if unable to take medications  · Any signs of decreased circulation or nerve impairment to extremity: change in color, persistent  numbness, tingling, coldness or increase pain  · Any questions    What to do at Home:  Recommended activity: Activity as tolerated, monitor blood sugars frequently. If you experience any of the following symptoms uncontrolled blood sugars, changes in mental status, fever, or difficulty urinating, please follow up with your primary care physician. *  Please give a list of your current medications to your Primary Care Provider. *  Please update this list whenever your medications are discontinued, doses are      changed, or new medications (including over-the-counter products) are added. *  Please carry medication information at all times in case of emergency situations. These are general instructions for a healthy lifestyle:    No smoking/ No tobacco products/ Avoid exposure to second hand smoke  Surgeon General's Warning:  Quitting smoking now greatly reduces serious risk to your health.     Obesity, smoking, and sedentary lifestyle greatly increases your risk for illness    A healthy diet, regular physical exercise & weight monitoring are important for maintaining a healthy lifestyle    You may be retaining fluid if you have a history of heart failure or if you experience any of the following symptoms:  Weight gain of 3 pounds or more overnight or 5 pounds in a week, increased swelling in our hands or feet or shortness of breath while lying flat in bed. Please call your doctor as soon as you notice any of these symptoms; do not wait until your next office visit. Recognize signs and symptoms of STROKE:    F-face looks uneven    A-arms unable to move or move unevenly    S-speech slurred or non-existent    T-time-call 911 as soon as signs and symptoms begin-DO NOT go       Back to bed or wait to see if you get better-TIME IS BRAIN. Warning Signs of HEART ATTACK     Call 911 if you have these symptoms:   Chest discomfort. Most heart attacks involve discomfort in the center of the chest that lasts more than a few minutes, or that goes away and comes back. It can feel like uncomfortable pressure, squeezing, fullness, or pain.  Discomfort in other areas of the upper body. Symptoms can include pain or discomfort in one or both arms, the back, neck, jaw, or stomach.  Shortness of breath with or without chest discomfort.  Other signs may include breaking out in a cold sweat, nausea, or lightheadedness. Don't wait more than five minutes to call 911 - MINUTES MATTER! Fast action can save your life. Calling 911 is almost always the fastest way to get lifesaving treatment. Emergency Medical Services staff can begin treatment when they arrive -- up to an hour sooner than if someone gets to the hospital by car. The discharge information has been reviewed with the patient. The patient verbalized understanding.   Discharge medications reviewed with the patient and appropriate educational materials and side effects teaching were provided.   ___________________________________________________________________________________________________________________________________

## 2019-01-31 NOTE — PHYSICIAN ADVISORY
Letter of Determination:  Outpatient status receiving Observation Services This patient was originally hospitalized as Inpatient Status on 1/26/2019 for hypoglycemia. At this time this patient does not appear to meet the medical necessity requirements to support an inpatient level of care. It is our recommendation that this patient's hospitalization status should be changed from INPATIENT to [place] receiving OBSERVATION services via Condition Code 44.  
  
This may change due to the medical condition of the patient and new clinical evidence as the patients care progreses. The final decision regarding the patient's hospitalization status depends on the attending physician's judgement. Dafne Chase MD, AYDEE, Physician Advisor [address] [place] [address].

## 2019-02-05 PROBLEM — N18.9 ANEMIA DUE TO CHRONIC KIDNEY DISEASE: Status: ACTIVE | Noted: 2019-02-05

## 2019-02-05 PROBLEM — D63.1 ANEMIA DUE TO CHRONIC KIDNEY DISEASE: Status: ACTIVE | Noted: 2019-02-05

## 2019-02-05 PROBLEM — N18.30 STAGE 3 CHRONIC KIDNEY DISEASE (HCC): Status: ACTIVE | Noted: 2019-02-05

## 2019-02-08 ENCOUNTER — HOSPITAL ENCOUNTER (OUTPATIENT)
Dept: LAB | Age: 84
Discharge: HOME | End: 2019-02-08
Payer: MEDICARE

## 2019-02-08 DIAGNOSIS — N18.30 ANEMIA DUE TO STAGE 3 CHRONIC KIDNEY DISEASE (HCC): ICD-10-CM

## 2019-02-08 DIAGNOSIS — D63.1 ANEMIA DUE TO STAGE 3 CHRONIC KIDNEY DISEASE (HCC): ICD-10-CM

## 2019-02-08 LAB
ALBUMIN SERPL-MCNC: 3.7 G/DL (ref 3.2–4.6)
ALBUMIN/GLOB SERPL: 0.9 {RATIO}
ALP SERPL-CCNC: 72 U/L (ref 50–136)
ALT SERPL-CCNC: 15 U/L (ref 12–65)
ANION GAP SERPL CALC-SCNC: 6 MMOL/L
AST SERPL-CCNC: 11 U/L (ref 15–37)
BASOPHILS # BLD: 0 K/UL (ref 0–0.2)
BASOPHILS NFR BLD: 0 % (ref 0–2)
BILIRUB SERPL-MCNC: 0.3 MG/DL (ref 0.2–1.1)
BUN SERPL-MCNC: 77 MG/DL (ref 8–23)
CALCIUM SERPL-MCNC: 10 MG/DL (ref 8.3–10.4)
CALCIUM SERPL-MCNC: 10 MG/DL (ref 8.3–10.4)
CHLORIDE SERPL-SCNC: 99 MMOL/L (ref 98–107)
CO2 SERPL-SCNC: 33 MMOL/L (ref 21–32)
CREAT SERPL-MCNC: 1.7 MG/DL (ref 0.6–1)
DIFFERENTIAL METHOD BLD: ABNORMAL
EOSINOPHIL # BLD: 0.1 K/UL (ref 0–0.8)
EOSINOPHIL NFR BLD: 2 % (ref 0.5–7.8)
ERYTHROCYTE [DISTWIDTH] IN BLOOD BY AUTOMATED COUNT: 15.4 % (ref 11.9–14.6)
GLOBULIN SER CALC-MCNC: 3.9 G/DL
GLUCOSE SERPL-MCNC: 143 MG/DL (ref 65–100)
HCT VFR BLD AUTO: 27.1 % (ref 35.8–46.3)
HGB BLD-MCNC: 8.1 G/DL (ref 11.7–15.4)
IMM GRANULOCYTES # BLD AUTO: 0 K/UL (ref 0–0.5)
IMM GRANULOCYTES NFR BLD AUTO: 0 % (ref 0–5)
LYMPHOCYTES # BLD: 0.4 K/UL (ref 0.5–4.6)
LYMPHOCYTES NFR BLD: 10 % (ref 13–44)
MCH RBC QN AUTO: 27.1 PG (ref 26.1–32.9)
MCHC RBC AUTO-ENTMCNC: 29.9 G/DL (ref 31.4–35)
MCV RBC AUTO: 90.6 FL (ref 79.6–97.8)
MONOCYTES # BLD: 0.3 K/UL (ref 0.1–1.3)
MONOCYTES NFR BLD: 9 % (ref 4–12)
NEUTS SEG # BLD: 2.9 K/UL (ref 1.7–8.2)
NEUTS SEG NFR BLD: 78 % (ref 43–78)
NRBC # BLD: 0 K/UL (ref 0–0.2)
PLATELET # BLD AUTO: 116 K/UL (ref 150–450)
PMV BLD AUTO: 11.9 FL (ref 9.4–12.3)
POTASSIUM SERPL-SCNC: 4 MMOL/L (ref 3.5–5.1)
PROT SERPL-MCNC: 7.6 G/DL (ref 6.3–8.2)
PTH-INTACT SERPL-MCNC: 158.7 PG/ML (ref 18.5–88)
RBC # BLD AUTO: 2.99 M/UL (ref 4.05–5.2)
SODIUM SERPL-SCNC: 138 MMOL/L (ref 136–145)
WBC # BLD AUTO: 3.7 K/UL (ref 4.3–11.1)

## 2019-02-08 PROCEDURE — 82668 ASSAY OF ERYTHROPOIETIN: CPT

## 2019-02-08 PROCEDURE — 85025 COMPLETE CBC W/AUTO DIFF WBC: CPT

## 2019-02-08 PROCEDURE — 82306 VITAMIN D 25 HYDROXY: CPT

## 2019-02-08 PROCEDURE — 83970 ASSAY OF PARATHORMONE: CPT

## 2019-02-08 PROCEDURE — 80053 COMPREHEN METABOLIC PANEL: CPT

## 2019-02-08 PROCEDURE — 36415 COLL VENOUS BLD VENIPUNCTURE: CPT

## 2019-02-08 NOTE — PROGRESS NOTES
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 Follow up with Nephrology as planned. Anemia improved slightly.

## 2019-02-09 LAB — 25(OH)D3+25(OH)D2 SERPL-MCNC: 43.6 NG/ML (ref 30–100)

## 2019-02-10 LAB — EPO SERPL-ACNC: 18 MIU/ML (ref 2.6–18.5)

## 2019-02-11 NOTE — PROGRESS NOTES
Pt notified of lab results and voiced understanding. Pt stated she needed a glucometer as well, so I put in a telephone encounter.

## 2019-05-31 PROBLEM — R60.0 LOWER EXTREMITY EDEMA: Status: ACTIVE | Noted: 2019-05-31

## 2019-06-10 ENCOUNTER — HOSPITAL ENCOUNTER (EMERGENCY)
Age: 84
Discharge: HOME | End: 2019-06-10
Attending: EMERGENCY MEDICINE
Payer: OTHER MISCELLANEOUS

## 2019-06-10 VITALS
BODY MASS INDEX: 36.07 KG/M2 | HEIGHT: 68 IN | SYSTOLIC BLOOD PRESSURE: 178 MMHG | HEART RATE: 84 BPM | RESPIRATION RATE: 20 BRPM | DIASTOLIC BLOOD PRESSURE: 95 MMHG | WEIGHT: 238 LBS | OXYGEN SATURATION: 100 %

## 2019-06-10 DIAGNOSIS — I50.9 CHRONIC CONGESTIVE HEART FAILURE, UNSPECIFIED HEART FAILURE TYPE (HCC): ICD-10-CM

## 2019-06-10 DIAGNOSIS — R07.9 CHEST PAIN, UNSPECIFIED TYPE: Primary | ICD-10-CM

## 2019-06-10 LAB
ALBUMIN SERPL-MCNC: 3.7 G/DL (ref 3.2–4.6)
ALBUMIN/GLOB SERPL: 0.9 {RATIO} (ref 1.2–3.5)
ALP SERPL-CCNC: 90 U/L (ref 50–136)
ALT SERPL-CCNC: 14 U/L (ref 12–65)
ANION GAP SERPL CALC-SCNC: 8 MMOL/L (ref 7–16)
AST SERPL-CCNC: 14 U/L (ref 15–37)
BASOPHILS # BLD: 0 K/UL (ref 0–0.2)
BASOPHILS NFR BLD: 0 % (ref 0–2)
BILIRUB SERPL-MCNC: 0.6 MG/DL (ref 0.2–1.1)
BUN SERPL-MCNC: 59 MG/DL (ref 8–23)
CALCIUM SERPL-MCNC: 10.6 MG/DL (ref 8.3–10.4)
CHLORIDE SERPL-SCNC: 97 MMOL/L (ref 98–107)
CO2 SERPL-SCNC: 34 MMOL/L (ref 21–32)
CREAT SERPL-MCNC: 1.31 MG/DL (ref 0.6–1)
DIFFERENTIAL METHOD BLD: ABNORMAL
EOSINOPHIL # BLD: 0 K/UL (ref 0–0.8)
EOSINOPHIL NFR BLD: 0 % (ref 0.5–7.8)
ERYTHROCYTE [DISTWIDTH] IN BLOOD BY AUTOMATED COUNT: 16.4 % (ref 11.9–14.6)
GLOBULIN SER CALC-MCNC: 4.1 G/DL (ref 2.3–3.5)
GLUCOSE SERPL-MCNC: 107 MG/DL (ref 65–100)
HCT VFR BLD AUTO: 30.1 % (ref 35.8–46.3)
HGB BLD-MCNC: 8.9 G/DL (ref 11.7–15.4)
IMM GRANULOCYTES # BLD AUTO: 0 K/UL (ref 0–0.5)
IMM GRANULOCYTES NFR BLD AUTO: 0 % (ref 0–5)
LYMPHOCYTES # BLD: 0.5 K/UL (ref 0.5–4.6)
LYMPHOCYTES NFR BLD: 9 % (ref 13–44)
MCH RBC QN AUTO: 25.7 PG (ref 26.1–32.9)
MCHC RBC AUTO-ENTMCNC: 29.6 G/DL (ref 31.4–35)
MCV RBC AUTO: 87 FL (ref 79.6–97.8)
MONOCYTES # BLD: 0.5 K/UL (ref 0.1–1.3)
MONOCYTES NFR BLD: 9 % (ref 4–12)
NEUTS SEG # BLD: 4.5 K/UL (ref 1.7–8.2)
NEUTS SEG NFR BLD: 82 % (ref 43–78)
NRBC # BLD: 0 K/UL (ref 0–0.2)
PLATELET # BLD AUTO: 115 K/UL (ref 150–450)
PMV BLD AUTO: 13.2 FL (ref 9.4–12.3)
POTASSIUM SERPL-SCNC: 3.9 MMOL/L (ref 3.5–5.1)
PROT SERPL-MCNC: 7.8 G/DL (ref 6.3–8.2)
RBC # BLD AUTO: 3.46 M/UL (ref 4.05–5.2)
SODIUM SERPL-SCNC: 139 MMOL/L (ref 136–145)
TROPONIN I BLD-MCNC: 0.03 NG/ML (ref 0.02–0.05)
TROPONIN I SERPL-MCNC: 0.03 NG/ML (ref 0.02–0.05)
WBC # BLD AUTO: 5.5 K/UL (ref 4.3–11.1)

## 2019-06-10 PROCEDURE — 93005 ELECTROCARDIOGRAM TRACING: CPT | Performed by: EMERGENCY MEDICINE

## 2019-06-10 PROCEDURE — 80053 COMPREHEN METABOLIC PANEL: CPT

## 2019-06-10 PROCEDURE — 84484 ASSAY OF TROPONIN QUANT: CPT

## 2019-06-10 PROCEDURE — 85025 COMPLETE CBC W/AUTO DIFF WBC: CPT

## 2019-06-10 PROCEDURE — 99285 EMERGENCY DEPT VISIT HI MDM: CPT | Performed by: EMERGENCY MEDICINE

## 2019-06-10 NOTE — ED TRIAGE NOTES
Patient presents via [facility] from home with complaints of mid sternal chest pain that began last night. Pain does not radiate anywhere. Patient presents with nitro paste on left chest and 1 nitro SL given en route-moderate relief. EKG RBB. /70, HR  afib, 97% 2L.  . 20G RH

## 2019-06-10 NOTE — ED PROVIDER NOTES
Patient has a history of diabetes, hypertension, coronary artery disease and heart failure. She states that she started having chest pain around 3 PM.  Lasted for 3 or 4 hours and then resolved. She describes as midsternal, sharp, nonradiating pain. They would get severe at times. She denies any aggravating or alleviating factors, denies any sensations of breath or nausea or diaphoresis. She denies similar chest pain in the past.  She states she currently now is feeling back to her baseline. Elements of this note were made using speech recognition software. As such, errors of speech recognition may occur.            Past Medical History:   Diagnosis Date    Acute gout involving toe of left foot 5/19/2017    CAD (coronary artery disease) 2007    \"irregular heart beat\"    Cardiomyopathy [facility]) 11/25/2009    Chronic indwelling Harper catheter     Chronic kidney disease 2002    on HD since Nov 2012    Chronic systolic heart failure [facility]) 2007    Colon polyps 2016    CVA (cerebral vascular accident) ([address]     CVA (cerebral vascular accident) ([address] 8/8/2016    Deep vein thrombosis (DVT) of lower extremity ([address] 5/27/2016    Diabetes ([address] 2002    Diabetes mellitus ([address] 6/20/2012    Duodenal ulcer     Duodenal ulcer 11/18/2012    Duodenal ulcer 11/18/2012    Dyslipidemia 8/8/2016    Essential hypertension 10/26/2009    Heart failure ([address]     Hypertension 2009    Hypoglycemia, unspecified 12/3/2012    JOÃO (iron deficiency anemia)     Obesity, morbid 11/25/2009    Other ill-defined conditions(799.89) 2002    Lymphedema    Persistent atrial fibrillation ([address] 10/26/2009    Pulmonary HTN ([address]     SBO (small bowel obstruction) ([address] 8/8/2013    Sleep apnea 2010    non-compliant with CPAP    Sleep apnea - noncompliant with CPAP 11/13/2012    Type II diabetes mellitus, uncontrolled ([address] 6/20/2012    Urinary tract infection 12/3/2012    UTI (lower urinary tract infection) 8/8/2013    UTI (urinary tract infection) 11/23/2012       Past Surgical History:   Procedure Laterality Date    COLONOSCOPY N/A 6/20/2016    COL[facility] performed [facility]COPY    HX COLONOSCOPY  2016    polyp    HX HYSTERECTOMY  >20 years ago    HX TUBAL LIGATION  >40 years ago         Family History:   Problem Relation Age of Onset    Heart Disease Sister     Diabetes Other        Social History     Socioeconomic History    Marital status:      Spouse name: Not on file    Number of children: Not on file    Years of education: Not on file    Highest education level: Not on file   Occupational History    Not on file   Social Needs    Financial resource strain: Not on file    Food insecurity:     Worry: Not on file     Inability: Not on file    Transportation needs:     Medical: Not on file     Non-medical: Not on file   Tobacco Use    Smoking status: Never Smoker    Smokeless tobacco: Never Used   Substance and Sexual Activity    Alcohol use: No    Drug use: No    Sexual activity: Not on file   Lifestyle    Physical activity:     Days per week: Not on file     Minutes per session: Not on file    Stress: Not on file   Relationships    Social connections:     Talks on phone: Not on file     Gets together: Not on file     Attends [employer] service: Not on file     Active member of club or organization: Not on file     Attends meetings of clubs or organizations: Not on file     Relationship status: Not on file    Intimate partner violence:     Fear of current or ex partner: Not on file     Emotionally abused: Not on file     Physically abused: Not on file     Forced sexual activity: Not on file   Other Topics Concern    Not on file   Social History Narrative    , lives at home with spouse. Retired day care worker         ALLERGIES: Benazepril; Dynacirc [isradipine]; Peanut; and Morphine    Review of Systems   Constitutional: Negative for chills and fever. Gastrointestinal: Negative for nausea and vomiting. All other systems reviewed and are negative. Vitals:    06/10/19 1640 06/10/19 1707 06/10/19 1807 06/10/19 1838   BP: 163/89 178/90 (!) 194/93 (!) 167/97   Pulse:  86 87 75   Resp: 20      SpO2: 100% 96% 99% 100%   Weight: 108 kg (238 lb)      Height: 5' 8\" (1.727 m)               Physical Exam   Constitutional: She is oriented to person, place, and time. She appears well-developed and well-nourished. HENT:   Head: Normocephalic and atraumatic. Eyes: Pupils are equal, round, and reactive to light. Conjunctivae are normal.   Neck: Normal range of motion. Neck supple. Cardiovascular: Normal rate, regular rhythm and normal heart sounds. Pulmonary/Chest: Effort normal and breath sounds normal.   Abdominal: Soft. Bowel sounds are normal.   Neurological: She is alert and oriented to person, place, and time. Skin: Skin is warm and dry. Psychiatric: She has a normal mood and affect. Her behavior is normal.   Nursing note and vitals reviewed. MDM  Number of Diagnoses or Management Options  Chest pain, unspecified type: new and does not require workup  Chronic congestive heart failure, unspecified heart failure type [facility]):   Diagnosis management comments: 9:13 PM According to old records, she is in hospice for her congestive heart failure. Discussed results with patient and family, unremarkable workup.        Amount and/or Complexity of Data Reviewed  Clinical lab tests: reviewed and ordered  Tests in the radiology section of CPT®: ordered and reviewed  Tests in the medicine section of CPT®: ordered and reviewed  Decide to obtain previous medical records or to obtain history from someone other than the patient: yes  Review and summarize past medical records: yes    Risk of Complications, Morbidity, and/or Mortality  Presenting problems: moderate  Diagnostic procedures: moderate  Management options: moderate    Patient Progress  Patient progress: stable         Procedures

## 2019-06-11 LAB
ATRIAL RATE: 88 BPM
CALCULATED P AXIS, ECG09: 23 DEGREES
CALCULATED R AXIS, ECG10: -167 DEGREES
CALCULATED T AXIS, ECG11: 15 DEGREES
DIAGNOSIS, 93000: NORMAL
Q-T INTERVAL, ECG07: 408 MS
QRS DURATION, ECG06: 172 MS
QTC CALCULATION (BEZET), ECG08: 482 MS
VENTRICULAR RATE, ECG03: 84 BPM

## 2019-06-11 NOTE — ED NOTES
I have reviewed discharge instructions with the caregiver. The caregiver verbalized understanding. Patient left ED via Discharge Method: stretcher to [facility] with [facility]       Patient given 0 scripts. To continue your aftercare when you leave the hospital, you may receive an automated call from our care team to check in on how you are doing. This is a free service and part of our promise to provide the best care and service to meet your aftercare needs.  If you have questions, or wish to unsubscribe from this service please call [phone]. Thank you for Choosing our [facility] Emergency Department.

## 2019-11-15 NOTE — PROGRESS NOTES
[employer] not in network with [facility]. Still awaiting response from [employer]. regular rate and rhythm